# Patient Record
Sex: FEMALE | Race: WHITE | Employment: OTHER | URBAN - METROPOLITAN AREA
[De-identification: names, ages, dates, MRNs, and addresses within clinical notes are randomized per-mention and may not be internally consistent; named-entity substitution may affect disease eponyms.]

---

## 2021-10-18 ENCOUNTER — HOSPITAL ENCOUNTER (INPATIENT)
Age: 66
LOS: 3 days | Discharge: HOME | DRG: 391 | End: 2021-10-21
Attending: EMERGENCY MEDICINE | Admitting: FAMILY MEDICINE
Payer: COMMERCIAL

## 2021-10-18 DIAGNOSIS — K65.1 INTRA-ABDOMINAL ABSCESS (HCC): Primary | ICD-10-CM

## 2021-10-18 LAB
ABSOLUTE EOS #: 0.17 K/UL (ref 0–0.44)
ABSOLUTE IMMATURE GRANULOCYTE: 0.04 K/UL (ref 0–0.3)
ABSOLUTE LYMPH #: 2.14 K/UL (ref 1.1–3.7)
ABSOLUTE MONO #: 0.82 K/UL (ref 0.1–1.2)
ALBUMIN SERPL-MCNC: 3.2 G/DL (ref 3.5–5.2)
ALBUMIN/GLOBULIN RATIO: ABNORMAL (ref 1–2.5)
ALP BLD-CCNC: 110 U/L (ref 35–104)
ALT SERPL-CCNC: 20 U/L (ref 5–33)
ANION GAP SERPL CALCULATED.3IONS-SCNC: 14 MMOL/L (ref 9–17)
AST SERPL-CCNC: 25 U/L
BASOPHILS # BLD: 0 % (ref 0–2)
BASOPHILS ABSOLUTE: 0.03 K/UL (ref 0–0.2)
BILIRUB SERPL-MCNC: 0.18 MG/DL (ref 0.3–1.2)
BUN BLDV-MCNC: 11 MG/DL (ref 8–23)
BUN/CREAT BLD: 20 (ref 9–20)
CALCIUM SERPL-MCNC: 8.8 MG/DL (ref 8.6–10.4)
CHLORIDE BLD-SCNC: 96 MMOL/L (ref 98–107)
CO2: 23 MMOL/L (ref 20–31)
CREAT SERPL-MCNC: 0.54 MG/DL (ref 0.5–0.9)
DIFFERENTIAL TYPE: ABNORMAL
EOSINOPHILS RELATIVE PERCENT: 2 % (ref 1–4)
GFR AFRICAN AMERICAN: >60 ML/MIN
GFR NON-AFRICAN AMERICAN: >60 ML/MIN
GFR SERPL CREATININE-BSD FRML MDRD: ABNORMAL ML/MIN/{1.73_M2}
GFR SERPL CREATININE-BSD FRML MDRD: ABNORMAL ML/MIN/{1.73_M2}
GLUCOSE BLD-MCNC: 144 MG/DL (ref 70–99)
HCT VFR BLD CALC: 33.7 % (ref 36.3–47.1)
HEMOGLOBIN: 10.5 G/DL (ref 11.9–15.1)
IMMATURE GRANULOCYTES: 0 %
LYMPHOCYTES # BLD: 20 % (ref 24–43)
MCH RBC QN AUTO: 26.2 PG (ref 25.2–33.5)
MCHC RBC AUTO-ENTMCNC: 31.2 G/DL (ref 28.4–34.8)
MCV RBC AUTO: 84 FL (ref 82.6–102.9)
MONOCYTES # BLD: 8 % (ref 3–12)
NRBC AUTOMATED: 0 PER 100 WBC
PDW BLD-RTO: 15.2 % (ref 11.8–14.4)
PLATELET # BLD: 348 K/UL (ref 138–453)
PLATELET ESTIMATE: ABNORMAL
PMV BLD AUTO: 9 FL (ref 8.1–13.5)
POTASSIUM SERPL-SCNC: 4.5 MMOL/L (ref 3.7–5.3)
RBC # BLD: 4.01 M/UL (ref 3.95–5.11)
RBC # BLD: ABNORMAL 10*6/UL
SEG NEUTROPHILS: 70 % (ref 36–65)
SEGMENTED NEUTROPHILS ABSOLUTE COUNT: 7.61 K/UL (ref 1.5–8.1)
SODIUM BLD-SCNC: 133 MMOL/L (ref 135–144)
TOTAL PROTEIN: 7.4 G/DL (ref 6.4–8.3)
WBC # BLD: 10.8 K/UL (ref 3.5–11.3)
WBC # BLD: ABNORMAL 10*3/UL

## 2021-10-18 PROCEDURE — 80053 COMPREHEN METABOLIC PANEL: CPT

## 2021-10-18 PROCEDURE — 6360000002 HC RX W HCPCS: Performed by: FAMILY MEDICINE

## 2021-10-18 PROCEDURE — 85025 COMPLETE CBC W/AUTO DIFF WBC: CPT

## 2021-10-18 PROCEDURE — 99283 EMERGENCY DEPT VISIT LOW MDM: CPT

## 2021-10-18 PROCEDURE — 2580000003 HC RX 258: Performed by: FAMILY MEDICINE

## 2021-10-18 PROCEDURE — 1200000000 HC SEMI PRIVATE

## 2021-10-18 RX ORDER — SODIUM CHLORIDE 0.9 % (FLUSH) 0.9 %
5-40 SYRINGE (ML) INJECTION PRN
Status: DISCONTINUED | OUTPATIENT
Start: 2021-10-18 | End: 2021-10-21 | Stop reason: HOSPADM

## 2021-10-18 RX ORDER — METFORMIN HYDROCHLORIDE 750 MG/1
750 TABLET, EXTENDED RELEASE ORAL
COMMUNITY
Start: 2021-10-07

## 2021-10-18 RX ORDER — CIPROFLOXACIN 2 MG/ML
400 INJECTION, SOLUTION INTRAVENOUS EVERY 12 HOURS
Status: DISCONTINUED | OUTPATIENT
Start: 2021-10-19 | End: 2021-10-19

## 2021-10-18 RX ORDER — ACETAMINOPHEN 325 MG/1
650 TABLET ORAL EVERY 4 HOURS PRN
Status: DISCONTINUED | OUTPATIENT
Start: 2021-10-18 | End: 2021-10-19 | Stop reason: SDUPTHER

## 2021-10-18 RX ORDER — ONDANSETRON 4 MG/1
4 TABLET, ORALLY DISINTEGRATING ORAL EVERY 8 HOURS PRN
Status: DISCONTINUED | OUTPATIENT
Start: 2021-10-18 | End: 2021-10-21 | Stop reason: HOSPADM

## 2021-10-18 RX ORDER — SODIUM CHLORIDE 0.9 % (FLUSH) 0.9 %
5-40 SYRINGE (ML) INJECTION EVERY 12 HOURS SCHEDULED
Status: DISCONTINUED | OUTPATIENT
Start: 2021-10-18 | End: 2021-10-21 | Stop reason: HOSPADM

## 2021-10-18 RX ORDER — ONDANSETRON 2 MG/ML
4 INJECTION INTRAMUSCULAR; INTRAVENOUS EVERY 6 HOURS PRN
Status: DISCONTINUED | OUTPATIENT
Start: 2021-10-18 | End: 2021-10-21 | Stop reason: HOSPADM

## 2021-10-18 RX ORDER — SODIUM CHLORIDE 9 MG/ML
25 INJECTION, SOLUTION INTRAVENOUS PRN
Status: DISCONTINUED | OUTPATIENT
Start: 2021-10-18 | End: 2021-10-21 | Stop reason: HOSPADM

## 2021-10-18 RX ADMIN — SODIUM CHLORIDE, PRESERVATIVE FREE 10 ML: 5 INJECTION INTRAVENOUS at 23:44

## 2021-10-18 RX ADMIN — SODIUM CHLORIDE 25 ML: 9 INJECTION, SOLUTION INTRAVENOUS at 23:43

## 2021-10-18 RX ADMIN — CIPROFLOXACIN 400 MG: 2 INJECTION, SOLUTION INTRAVENOUS at 23:43

## 2021-10-19 LAB
ABSOLUTE EOS #: 0.17 K/UL (ref 0–0.44)
ABSOLUTE IMMATURE GRANULOCYTE: 0.02 K/UL (ref 0–0.3)
ABSOLUTE LYMPH #: 1.25 K/UL (ref 1.1–3.7)
ABSOLUTE MONO #: 0.51 K/UL (ref 0.1–1.2)
ANION GAP SERPL CALCULATED.3IONS-SCNC: 11 MMOL/L (ref 9–17)
BASOPHILS # BLD: 1 % (ref 0–2)
BASOPHILS ABSOLUTE: 0.03 K/UL (ref 0–0.2)
BUN BLDV-MCNC: 8 MG/DL (ref 8–23)
BUN/CREAT BLD: 16 (ref 9–20)
C-REACTIVE PROTEIN: 69.5 MG/L (ref 0–5)
CALCIUM SERPL-MCNC: 9 MG/DL (ref 8.6–10.4)
CHLORIDE BLD-SCNC: 103 MMOL/L (ref 98–107)
CO2: 25 MMOL/L (ref 20–31)
CREAT SERPL-MCNC: 0.5 MG/DL (ref 0.5–0.9)
DIFFERENTIAL TYPE: ABNORMAL
EOSINOPHILS RELATIVE PERCENT: 3 % (ref 1–4)
ESTIMATED AVERAGE GLUCOSE: 143 MG/DL
GFR AFRICAN AMERICAN: >60 ML/MIN
GFR NON-AFRICAN AMERICAN: >60 ML/MIN
GFR SERPL CREATININE-BSD FRML MDRD: ABNORMAL ML/MIN/{1.73_M2}
GFR SERPL CREATININE-BSD FRML MDRD: ABNORMAL ML/MIN/{1.73_M2}
GLUCOSE BLD-MCNC: 104 MG/DL (ref 65–105)
GLUCOSE BLD-MCNC: 118 MG/DL (ref 70–99)
GLUCOSE BLD-MCNC: 122 MG/DL (ref 65–105)
GLUCOSE BLD-MCNC: 123 MG/DL (ref 65–105)
GLUCOSE BLD-MCNC: 152 MG/DL (ref 65–105)
HBA1C MFR BLD: 6.6 % (ref 4–6)
HCT VFR BLD CALC: 34 % (ref 36.3–47.1)
HEMOGLOBIN: 10.3 G/DL (ref 11.9–15.1)
IMMATURE GRANULOCYTES: 0 %
LYMPHOCYTES # BLD: 25 % (ref 24–43)
MCH RBC QN AUTO: 25.9 PG (ref 25.2–33.5)
MCHC RBC AUTO-ENTMCNC: 30.3 G/DL (ref 28.4–34.8)
MCV RBC AUTO: 85.6 FL (ref 82.6–102.9)
MONOCYTES # BLD: 10 % (ref 3–12)
NRBC AUTOMATED: 0 PER 100 WBC
PDW BLD-RTO: 15.2 % (ref 11.8–14.4)
PLATELET # BLD: 492 K/UL (ref 138–453)
PLATELET ESTIMATE: ABNORMAL
PMV BLD AUTO: 8.3 FL (ref 8.1–13.5)
POTASSIUM SERPL-SCNC: 4.4 MMOL/L (ref 3.7–5.3)
PROCALCITONIN: 0.05 NG/ML
RBC # BLD: 3.97 M/UL (ref 3.95–5.11)
RBC # BLD: ABNORMAL 10*6/UL
SEDIMENTATION RATE, ERYTHROCYTE: 88 MM (ref 0–30)
SEG NEUTROPHILS: 60 % (ref 36–65)
SEGMENTED NEUTROPHILS ABSOLUTE COUNT: 2.98 K/UL (ref 1.5–8.1)
SODIUM BLD-SCNC: 139 MMOL/L (ref 135–144)
WBC # BLD: 5 K/UL (ref 3.5–11.3)
WBC # BLD: ABNORMAL 10*3/UL

## 2021-10-19 PROCEDURE — 94761 N-INVAS EAR/PLS OXIMETRY MLT: CPT

## 2021-10-19 PROCEDURE — 1200000000 HC SEMI PRIVATE

## 2021-10-19 PROCEDURE — 6360000002 HC RX W HCPCS: Performed by: FAMILY MEDICINE

## 2021-10-19 PROCEDURE — 83036 HEMOGLOBIN GLYCOSYLATED A1C: CPT

## 2021-10-19 PROCEDURE — 84145 PROCALCITONIN (PCT): CPT

## 2021-10-19 PROCEDURE — 99254 IP/OBS CNSLTJ NEW/EST MOD 60: CPT | Performed by: INTERNAL MEDICINE

## 2021-10-19 PROCEDURE — 2580000003 HC RX 258: Performed by: HOSPITALIST

## 2021-10-19 PROCEDURE — 85025 COMPLETE CBC W/AUTO DIFF WBC: CPT

## 2021-10-19 PROCEDURE — 82947 ASSAY GLUCOSE BLOOD QUANT: CPT

## 2021-10-19 PROCEDURE — 6360000002 HC RX W HCPCS: Performed by: HOSPITALIST

## 2021-10-19 PROCEDURE — 36415 COLL VENOUS BLD VENIPUNCTURE: CPT

## 2021-10-19 PROCEDURE — 80048 BASIC METABOLIC PNL TOTAL CA: CPT

## 2021-10-19 PROCEDURE — C9113 INJ PANTOPRAZOLE SODIUM, VIA: HCPCS | Performed by: FAMILY MEDICINE

## 2021-10-19 PROCEDURE — 6370000000 HC RX 637 (ALT 250 FOR IP): Performed by: FAMILY MEDICINE

## 2021-10-19 PROCEDURE — 85652 RBC SED RATE AUTOMATED: CPT

## 2021-10-19 PROCEDURE — 86140 C-REACTIVE PROTEIN: CPT

## 2021-10-19 PROCEDURE — 2580000003 HC RX 258: Performed by: FAMILY MEDICINE

## 2021-10-19 PROCEDURE — 2500000003 HC RX 250 WO HCPCS: Performed by: FAMILY MEDICINE

## 2021-10-19 PROCEDURE — 87040 BLOOD CULTURE FOR BACTERIA: CPT

## 2021-10-19 RX ORDER — MAGNESIUM SULFATE 1 G/100ML
1000 INJECTION INTRAVENOUS PRN
Status: DISCONTINUED | OUTPATIENT
Start: 2021-10-19 | End: 2021-10-21 | Stop reason: HOSPADM

## 2021-10-19 RX ORDER — SENNA PLUS 8.6 MG/1
1 TABLET ORAL 2 TIMES DAILY PRN
Status: DISCONTINUED | OUTPATIENT
Start: 2021-10-19 | End: 2021-10-21 | Stop reason: HOSPADM

## 2021-10-19 RX ORDER — MORPHINE SULFATE 2 MG/ML
1 INJECTION, SOLUTION INTRAMUSCULAR; INTRAVENOUS EVERY 4 HOURS PRN
Status: DISCONTINUED | OUTPATIENT
Start: 2021-10-19 | End: 2021-10-21

## 2021-10-19 RX ORDER — DEXTROSE MONOHYDRATE 25 G/50ML
12.5 INJECTION, SOLUTION INTRAVENOUS PRN
Status: DISCONTINUED | OUTPATIENT
Start: 2021-10-19 | End: 2021-10-21 | Stop reason: HOSPADM

## 2021-10-19 RX ORDER — ZOLPIDEM TARTRATE 5 MG/1
5 TABLET ORAL NIGHTLY PRN
Status: DISCONTINUED | OUTPATIENT
Start: 2021-10-19 | End: 2021-10-21 | Stop reason: HOSPADM

## 2021-10-19 RX ORDER — ALBUTEROL SULFATE 90 UG/1
2 AEROSOL, METERED RESPIRATORY (INHALATION) EVERY 6 HOURS PRN
Status: DISCONTINUED | OUTPATIENT
Start: 2021-10-19 | End: 2021-10-21 | Stop reason: HOSPADM

## 2021-10-19 RX ORDER — SODIUM CHLORIDE 9 MG/ML
10 INJECTION INTRAVENOUS DAILY
Status: DISCONTINUED | OUTPATIENT
Start: 2021-10-19 | End: 2021-10-21 | Stop reason: HOSPADM

## 2021-10-19 RX ORDER — DEXTROSE MONOHYDRATE 50 MG/ML
100 INJECTION, SOLUTION INTRAVENOUS PRN
Status: DISCONTINUED | OUTPATIENT
Start: 2021-10-19 | End: 2021-10-21 | Stop reason: HOSPADM

## 2021-10-19 RX ORDER — NICOTINE POLACRILEX 4 MG
15 LOZENGE BUCCAL PRN
Status: DISCONTINUED | OUTPATIENT
Start: 2021-10-19 | End: 2021-10-21 | Stop reason: HOSPADM

## 2021-10-19 RX ORDER — MAGNESIUM SULFATE 1 G/100ML
1000 INJECTION INTRAVENOUS PRN
Status: DISCONTINUED | OUTPATIENT
Start: 2021-10-19 | End: 2021-10-19 | Stop reason: SDUPTHER

## 2021-10-19 RX ORDER — SODIUM CHLORIDE 9 MG/ML
INJECTION, SOLUTION INTRAVENOUS CONTINUOUS
Status: DISCONTINUED | OUTPATIENT
Start: 2021-10-19 | End: 2021-10-21

## 2021-10-19 RX ORDER — SODIUM CHLORIDE 9 MG/ML
25 INJECTION, SOLUTION INTRAVENOUS PRN
Status: DISCONTINUED | OUTPATIENT
Start: 2021-10-19 | End: 2021-10-19 | Stop reason: SDUPTHER

## 2021-10-19 RX ORDER — POTASSIUM CHLORIDE 20 MEQ/1
40 TABLET, EXTENDED RELEASE ORAL PRN
Status: DISCONTINUED | OUTPATIENT
Start: 2021-10-19 | End: 2021-10-21 | Stop reason: HOSPADM

## 2021-10-19 RX ORDER — ROSUVASTATIN CALCIUM 5 MG/1
5 TABLET, COATED ORAL NIGHTLY
COMMUNITY

## 2021-10-19 RX ORDER — HYDROCODONE BITARTRATE AND ACETAMINOPHEN 5; 325 MG/1; MG/1
1.5 TABLET ORAL EVERY 4 HOURS PRN
Status: DISCONTINUED | OUTPATIENT
Start: 2021-10-19 | End: 2021-10-21 | Stop reason: HOSPADM

## 2021-10-19 RX ORDER — ACETAMINOPHEN 650 MG/1
650 SUPPOSITORY RECTAL EVERY 6 HOURS PRN
Status: DISCONTINUED | OUTPATIENT
Start: 2021-10-19 | End: 2021-10-21 | Stop reason: HOSPADM

## 2021-10-19 RX ORDER — SODIUM CHLORIDE 0.9 % (FLUSH) 0.9 %
10 SYRINGE (ML) INJECTION EVERY 12 HOURS SCHEDULED
Status: DISCONTINUED | OUTPATIENT
Start: 2021-10-19 | End: 2021-10-19 | Stop reason: SDUPTHER

## 2021-10-19 RX ORDER — PANTOPRAZOLE SODIUM 40 MG/10ML
40 INJECTION, POWDER, LYOPHILIZED, FOR SOLUTION INTRAVENOUS DAILY
Status: DISCONTINUED | OUTPATIENT
Start: 2021-10-19 | End: 2021-10-21 | Stop reason: HOSPADM

## 2021-10-19 RX ORDER — MORPHINE SULFATE 2 MG/ML
2 INJECTION, SOLUTION INTRAMUSCULAR; INTRAVENOUS
Status: DISCONTINUED | OUTPATIENT
Start: 2021-10-19 | End: 2021-10-19 | Stop reason: SDUPTHER

## 2021-10-19 RX ORDER — ACETAMINOPHEN 325 MG/1
650 TABLET ORAL EVERY 6 HOURS PRN
Status: DISCONTINUED | OUTPATIENT
Start: 2021-10-19 | End: 2021-10-21 | Stop reason: HOSPADM

## 2021-10-19 RX ORDER — DOXYCYCLINE HYCLATE 50 MG/1
50 CAPSULE ORAL DAILY
Status: ON HOLD | COMMUNITY
End: 2021-10-21 | Stop reason: HOSPADM

## 2021-10-19 RX ORDER — POTASSIUM CHLORIDE 7.45 MG/ML
10 INJECTION INTRAVENOUS PRN
Status: DISCONTINUED | OUTPATIENT
Start: 2021-10-19 | End: 2021-10-21 | Stop reason: HOSPADM

## 2021-10-19 RX ORDER — SODIUM CHLORIDE 0.9 % (FLUSH) 0.9 %
10 SYRINGE (ML) INJECTION PRN
Status: DISCONTINUED | OUTPATIENT
Start: 2021-10-19 | End: 2021-10-19 | Stop reason: SDUPTHER

## 2021-10-19 RX ADMIN — METRONIDAZOLE 500 MG: 500 INJECTION, SOLUTION INTRAVENOUS at 00:44

## 2021-10-19 RX ADMIN — PIPERACILLIN AND TAZOBACTAM 3375 MG: 3; .375 INJECTION, POWDER, LYOPHILIZED, FOR SOLUTION INTRAVENOUS at 23:50

## 2021-10-19 RX ADMIN — SODIUM CHLORIDE: 9 INJECTION, SOLUTION INTRAVENOUS at 11:00

## 2021-10-19 RX ADMIN — SODIUM CHLORIDE, PRESERVATIVE FREE 10 ML: 5 INJECTION INTRAVENOUS at 08:16

## 2021-10-19 RX ADMIN — INSULIN LISPRO 2 UNITS: 100 INJECTION, SOLUTION INTRAVENOUS; SUBCUTANEOUS at 12:18

## 2021-10-19 RX ADMIN — PIPERACILLIN SODIUM AND TAZOBACTAM SODIUM 4500 MG: 4; .5 INJECTION, POWDER, LYOPHILIZED, FOR SOLUTION INTRAVENOUS at 11:02

## 2021-10-19 RX ADMIN — METRONIDAZOLE 500 MG: 500 INJECTION, SOLUTION INTRAVENOUS at 08:17

## 2021-10-19 RX ADMIN — SODIUM CHLORIDE: 9 INJECTION, SOLUTION INTRAVENOUS at 23:47

## 2021-10-19 RX ADMIN — PIPERACILLIN AND TAZOBACTAM 3375 MG: 3; .375 INJECTION, POWDER, LYOPHILIZED, FOR SOLUTION INTRAVENOUS at 16:09

## 2021-10-19 RX ADMIN — PANTOPRAZOLE SODIUM 40 MG: 40 INJECTION, POWDER, FOR SOLUTION INTRAVENOUS at 08:15

## 2021-10-19 NOTE — FLOWSHEET NOTE
[facility]  PROGRESS NOTE    Room # 2016/2016-02   Name: Trey Busch              Reason for visit: Routine    I visited the patient and family. Admit Date & Time: 10/18/2021  9:06 PM    Assessment:  Trey Busch is a 60 y.o. female in the hospital because \"diverticulitis. \" Upon entering the room patient was sitting up talking to  and brother. Invited  into room. Intervention:  I introduced myself and my title as  I offered space for patient and family  to express feelings, needs, and concerns and provided a ministry presence. Patient is \"pain free\" and \"hungry\" but \"clear liquids only for now. \"  Confirmed patient's support system - family but no community of missy. Offered prayer card and brief prayer. Outcome:  Family expressed gratitude for visit. Plan:  Chaplains will remain available to offer spiritual and emotional support as needed. Electronically signed by Shruthi Burnett on 10/19/2021 at [address]         10/19/21 1024   Encounter Summary   Services provided to: Patient and family together   Referral/Consult From: Family;Rounding   Support System Family members  (: Pedro Cooper; Brother: Hayden Smiley)   Continue Visiting   (10/19/21)   Complexity of Encounter Low   Length of Encounter 15 minutes   Spiritual Assessment Completed Yes   Routine   Type Initial   Assessment Calm; Approachable;Coping   Intervention Active listening;Explored coping resources;Nurtured hope;Provided reading materials/devotional materials;Prayer;Sustaining presence/ Ministry of presence   Outcome Comfort;Expressed gratitude;Engaged in conversation

## 2021-10-19 NOTE — CARE COORDINATION
Case Management Initial Discharge Plan  Alec Quigley,             Met with:patient, spouse/SO or family member patient and brother to discuss discharge plans. Information verified: address, contacts, phone number, , insurance Yes  PCP: Lubna An MD  Date of last visit:     Insurance Provider: [facility] [place]    Discharge Planning    Living Arrangements:  Spouse/Significant Other   Support Systems:  Spouse/Significant Other    Home has 1 stories  2 stairs to climb to get into front door, 0 stairs to climb to reach second floor  Location of bedroom/bathroom in home  - main floor    Patient able to perform ADL's:Independent    Current Services (outpatient & in home) none  DME equipment: none  DME provider: N/A    Pharmacy: [facility]    Potential Assistance Needed:  N/A    Patient agreeable to home care: No  [place] of choice provided:  n/a    Prior SNF/Rehab Placement and Facility: No  Agreeable to SNF/Rehab: No  [place] of choice provided: n/a   Evaluation: n/a    Expected Discharge date:  10/21/21  Patient expects to be discharged to: Follow Up Appointment: Best Day/ Time: Thursday PM    Transportation provider:   Transportation arrangements needed for discharge: No    Readmission Risk              Risk of Unplanned Readmission:  8             Does patient have a readmission risk score greater than 14?: No  If yes, follow-up appointment must be made within 7 days of discharge. Goal of Care:       Discharge Plan: Met with patient,  and brother at bedside. Lives with , independent and drives. Admitted for intra-abd abscess found on OP CT scan. Has history of diverticulitis diagnosed this year. Currently on IV Zosyn. Surgery and ID consulted. No surgical intervention needed at this time since abscess is small and does not need drained.   No home needs anticipated and continue to follow surgery and ID plan of care.              Electronically signed by Haroldo Pendleton RN on 10/19/21 at 3:23 PM EDT

## 2021-10-19 NOTE — PROGRESS NOTES
Learning About the Safe Use of Antibiotics  Introduction  Antibiotics are drugs used to kill bacteria. Bacteria can cause infections. These include strep throat, ear infections, and pneumonia. These medicines can't cure everything. They don't kill viruses or help with allergies. They don't help illnesses such as the common cold, the flu, or a runny nose. And they can cause side effects. There are many types of antibiotics. Your doctor will decide which one will work best for your infection. Examples include:  · Amoxicillin. · Cephalexin (Keflex). · Ciprofloxacin (Cipro). What are the possible side effects? Side effects can include:  · Nausea. · Diarrhea. · Skin rash. · Yeast infection. · A severe allergic reaction. It may cause itching, swelling, and breathing problems. This is rare. You may have other side effects or reactions not listed here. Check the information that comes with your medicine. Should you take antibiotics just in case? Don't take antibiotics when you don't need them. If you do that, they may not work when you do need them. Each time you take antibiotics, you are more likely to have some bacteria that survive and aren't killed by the medicine. Bacteria that don't die can change and become even harder to kill. These are called antibiotic-resistant bacteria. They can cause longer and more serious infections. To treat them, you may need different, stronger antibiotics that have more side effects and may cost more. So always ask your doctor if antibiotics are the best treatment. Explain that you do not want antibiotics unless you need them. Help protect the community  Using antibiotics when they're not needed leads to the development of antibiotic-resistant bacteria. These tougher bacteria can spread to family members, children, and coworkers. People in your community will have a risk of getting an infection that is harder to cure and that costs more to treat.   How can you take antibiotics safely? Be safe with medicine. Take your antibiotics as directed. Do not stop taking them just because you feel better. You need to take the full course of medicine. This will help make sure your infection is cured. It will also help prevent the growth of antibiotic-resistant bacteria. Always take the exact amount that the label says to take. If the label says to take the medicine at a certain time, follow those directions. You might feel better after you take an antibiotic for a few days. But it is important to keep taking it for as long as prescribed. That will help you get rid of those bacteria that are a bit stronger and that survive the first few days of treatment. Where can you learn more? Go to https://[employer]. org and sign in to your [employer] account. Enter N566 in the [employer] box to learn more about \"Learning About the Safe Use of Antibiotics. \"     If you do not have an account, please click on the \"Sign Up Now\" link. Current as of: March 3, 2017  Content Version: 11.3  © 2134-8938 [employer]. Care instructions adapted under license by [facility] ([facility]). If you have questions about a medical condition or this instruction, always ask your healthcare professional. [address] any warranty or liability for your use of this information. Antibiotics are powerful drugs that are generally safe and very helpful in fighting disease, but there are times when antibiotics can actually be harmful. Antibiotics can have side effects, including allergic reactions and a potentially deadly diarrhea caused by the bacteria Clostridium difficile (C. diff). Antibiotics can also interfere with the action of other drugs a patient may be taking for another condition. These unintended reactions to antibiotics are called adverse drug events.    When someone takes an antibiotic that they do not need, they are needlessly exposed to the side effects of the drug and do not get any benefit from it. Moreover, taking an antibiotic when it is not needed can lead to the development of antibiotic resistance. When resistance develops, antibiotics may not be able to stop future infections. Every time someone takes an antibiotic they dont need, they increase their risk of developing a resistant infection in the future. Types of Adverse Drug Events Related to Antibiotics  Allergic Reactions  Every year, there are more than 140,000 emergency department visits for reactions to antibiotics. Almost four out of five (79%) emergency department visits for antibiotic-related adverse drug events are due to an allergic reaction. These reactions can range from mild rashes and itching to serious blistering skin reactions swelling of the face and throat, and breathing problems. Minimizing unnecessary antibiotic use is the best way to reduce the risk of adverse drug events from antibiotics. Patients should tell their doctors about any past drug reactions or allergies. C. difficile  C. difficile causes diarrhea linked to at least 14,000 [place] deaths each year. When a person takes antibiotics, good bacteria that protect against infection are destroyed for several months. During this time, patients can get sick from C. difficile picked up from contaminated surfaces or spread from a healthcare providers hands. Those most at risk are people, especially older adults, who take antibiotics and also get medical care. Take antibiotics exactly and only as prescribed. Drug Interactions and Side Effects  Antibiotics can interact with other drugs patients take, making those drugs or the antibiotics less effective. Some drug combinations can worsen the side effects of the antibiotic or other drug. Common side effects of antibiotics include nausea, diarrhea, and stomach pain. Sometimes these symptoms can lead to dehydration and other problems.  Patients should ask their doctors about drug interactions and the potential side effects of antibiotics.  The doctor should be told immediately if a patient has any side effects from antibiotics  Page last updated: February 24, 2017 Content source:   [employer] [employer] and [employer] for Emerging and Zoonotic Infectious Diseases ([facility])  Division of Healthcare Quality Promotion [facility] [place])

## 2021-10-19 NOTE — H&P
History & Physical  [facility].,    Adult Hospitalist      Name: Rudy Roe  MRN: [id number]     Acct: [de-identified]  Room: 2016/2016-02    Admit Date: 10/18/2021  9:06 PM  PCP: Sabra Howell MD    Primary Problem  Active Problems:    Intra-abdominal abscess [facility])  Resolved Problems:    * No resolved hospital problems. *        Assesment:     · Acute diverticulitis  · Intra-abdominal abscess  · Acute abdominal pain secondary to above  · Diabetes type 2        Plan:     · Admit to MedSurg telemetry  · O2 to maintain oxygen saturation greater than 92%  · Lactate and procalcitonin  · Blood culture  · Empiric antibiotics as below  · Pain control  · Antiemetics  · N.p.o.  · General surgery consult  · ID consult   · continue to monitor/telemetry/CBC with differential daily/BMP daily  · DVT and GI prophylaxis.   Continue medications as below      Scheduled Meds:   insulin lispro  0-12 Units SubCUTAneous TID WC    insulin lispro  0-6 Units SubCUTAneous Nightly    pantoprazole  40 mg IntraVENous Daily    And    sodium chloride (PF)  10 mL IntraVENous Daily    sodium chloride flush  10 mL IntraVENous 2 times per day    enoxaparin  40 mg SubCUTAneous Daily    piperacillin-tazobactam  4,500 mg IntraVENous Once    And    piperacillin-tazobactam  3,375 mg IntraVENous Q8H    sodium chloride flush  5-40 mL IntraVENous 2 times per day    enoxaparin  40 mg SubCUTAneous Daily     Continuous Infusions:   dextrose      sodium chloride      sodium chloride      sodium chloride 25 mL (10/18/21 8539)     PRN Meds:  glucose, 15 g, PRN  dextrose, 12.5 g, PRN  glucagon (rDNA), 1 mg, PRN  dextrose, 100 mL/hr, PRN  morphine, 2 mg, Q2H PRN  sodium chloride flush, 10 mL, PRN  sodium chloride, 25 mL, PRN  potassium chloride, 40 mEq, PRN   Or  potassium alternative oral replacement, 40 mEq, PRN   Or  potassium chloride, 10 mEq, PRN  magnesium sulfate, 1,000 mg, PRN  senna, 5 mL, BID PRN  acetaminophen, 650 mg, Q6H PRN Or  acetaminophen, 650 mg, Q6H PRN  morphine, 1 mg, Q4H PRN  zolpidem, 5 mg, Nightly PRN  magnesium sulfate, 1,000 mg, PRN  sodium chloride flush, 5-40 mL, PRN  sodium chloride, 25 mL, PRN  acetaminophen, 650 mg, Q4H PRN  ondansetron, 4 mg, Q8H PRN   Or  ondansetron, 4 mg, Q6H PRN        Chief Complaint:     Chief Complaint   Patient presents with    Other     CT done today @ [facility] seen by Dr. Fabian Mera told to be admitted         History of Present Illness:      Laurie Chavis is a 60 y.o.  female who presents with Other (CT done today @ [facility] seen by Dr. Fabian Mera told to be admitted)    29-year-old lady with past medical history of asthma, GERD, diabetes presented to ER after instruction from her surgeon. Per report patient had CT scanning abdomen done in [facility] and her surgeon told her to go to the ER for admission. Patient has had a history of diverticulitis. She was having frequent bowel movement. Per report, CT scan shows left-sided colitis such as diverticulitis and fluid collection along the anterior margin of the left psoas and iliac is finding consistent with contained perforation or multifocal abscess. Patient has been complaining of left lower quadrant pain, moderate to severe. She denies any nausea, vomiting, chest pain, fever, chills, change in urination or rash. I have personally reviewed the past medical history, past surgical history, medications, social history, and family history, and summarized in the note. Review of Systems:     All 10 point system is reviewed and negative otherwise mentioned in HPI.       Past Medical History:     Past Medical History:   Diagnosis Date    Arthritis     knee, back    Asthma     GERD (gastroesophageal reflux disease)         Past Surgical History:     Past Surgical History:   Procedure Laterality Date    COLONOSCOPY      HERNIA REPAIR      umbilical    HERNIA REPAIR  07/19/2016    incisional with mesh, robotic converted to open    TONSILLECTOMY Medications Prior to Admission:       Prior to Admission medications    Medication Sig Start Date End Date Taking? Authorizing Provider   metFORMIN (GLUCOPHAGE-XR) 500 MG extended release tablet  10/7/21  Yes Historical Provider, MD   doxycycline (ORACEA) 40 MG capsule Take 40 mg by mouth every morning   Yes Historical Provider, MD   omeprazole (PRILOSEC) 40 MG capsule Take 40 mg by mouth daily   Yes Historical Provider, MD   naproxen (NAPROSYN) 500 MG tablet Take 500 mg by mouth 2 times daily as needed for Pain   Yes Historical Provider, MD   HYDROcodone-acetaminophen (NORCO) 7.5-325 MG per tablet 1-2 tabs every 4 hours prn pain 16   Latonia Duncan MD   Multiple Vitamins-Minerals (THERAPEUTIC MULTIVITAMIN-MINERALS) tablet Take 1 tablet by mouth daily    Historical Provider, MD   Omega-3 Fatty Acids (FISH OIL) 1000 MG CAPS Take 1,000 mg by mouth daily    Historical Provider, MD   albuterol sulfate  (90 BASE) MCG/ACT inhaler Inhale 2 puffs into the lungs every 6 hours as needed for Wheezing    Historical Provider, MD        Allergies:       Sulfa antibiotics    Social History:     Tobacco:    reports that she has quit smoking. Her smoking use included cigarettes. She has a 0.75 pack-year smoking history. She has never used smokeless tobacco.  Alcohol:      reports current alcohol use. Drug Use:  reports no history of drug use. Family History:     History reviewed. No pertinent family history.       Physical Exam:     Vitals:  BP 95/63   Pulse 71   Temp 97.7 °F (36.5 °C) (Oral)   Resp 16   Ht 5' 8\" (1.727 m)   Wt 195 lb 14.4 oz (88.9 kg)   SpO2 98%   BMI 29.79 kg/m²   Temp (24hrs), Av °F (36.7 °C), Min:97.7 °F (36.5 °C), Max:98.2 °F (36.8 °C)          General appearance - alert, well appearing, and in no acute distress  Mental status - oriented to person, place, and time with normal affect  Head - normocephalic and atraumatic  Eyes - pupils equal and reactive, extraocular eye movements intact, conjunctiva clear  Ears - hearing appears to be intact  Nose - no drainage noted  Mouth - mucous membranes moist  Neck - supple, no carotid bruits, thyroid not palpable  Chest - clear to auscultation, normal effort  Heart - normal rate, regular rhythm, no murmur  Abdomen - soft, nontender, nondistended, bowel sounds present all four quadrants, no masses, hepatomegaly or splenomegaly  Neurological - normal speech, no focal findings or movement disorder noted, cranial nerves II through XII grossly intact  Extremities - peripheral pulses palpable, no pedal edema or calf pain with palpation  Skin - no gross lesions, rashes, or induration noted        Data:     Labs:    Hematology:  Recent Labs     10/18/21  2140 10/19/21  0743   WBC 10.8 5.0   RBC 4.01 3.97   HGB 10.5* 10.3*   HCT 33.7* 34.0*   MCV 84.0 85.6   MCH 26.2 25.9   MCHC 31.2 30.3   RDW 15.2* 15.2*    492*   MPV 9.0 8.3   SEDRATE  --  88*     Chemistry:  Recent Labs     10/18/21  2140 10/19/21  0743   * 139   K 4.5 4.4   CL 96* 103   CO2 23 25   GLUCOSE 144* 118*   BUN 11 8   CREATININE 0.54 0.50   ANIONGAP 14 11   LABGLOM >60 >60   GFRAA >60 >60   CALCIUM 8.8 9.0     Recent Labs     10/18/21  2140 10/19/21  0602   PROT 7.4  --    LABALBU 3.2*  --    AST 25  --    ALT 20  --    ALKPHOS 110*  --    BILITOT 0.18*  --    POCGLU  --  122*       No results found for: INR, PROTIME    No results found for: SPECIAL  No results found for: CULTURE    No results found for: POCPH, PHART, PH, POCPCO2, DEZ4IVC, PCO2, POCPO2, PO2ART, PO2, POCHCO3, OWA4MFR, HCO3, NBEA, PBEA, BEART, BE, THGBART, THB, IFB2DTE, BXUQ7TVX, F7ZKQMZM, O2SAT, FIO2    Radiology:    No results found.       All radiological studies reviewed                Code Status:  Full Code    Electronically signed by Mariann Espinoza MD on 10/19/2021 at 9:30 AM     Copy sent to Dr. Charito Gaines MD    This note was created with the assistance of a speech-recognition program.  Although the intention is to generate a document that actually reflects the content of the visit, no guarantees can be provided that every mistake has been identified and corrected by editing. Note was updated later by me after  physical examination and  completion of the assessment.

## 2021-10-19 NOTE — CONSULTS
[facility]  Initial Consult Note  Date: 10/19/2021    Hospital day :1     Impression:   1. Recurrent diverticulitis with intra-abdominal abscess    Recommendations   · Continue intravenous antimicrobial therapy with Zosyn. · The patient does not have any major clinical symptoms other than some mild abdominal pain. · The question at this point time is whether she will need a full course of IV antimicrobial therapy with follow-up CT imaging versus switching her to oral antibiotics. · I do note the plan per general surgery would be for surgical colon resection once the inflammatory process has cooled down. · I will follow her progress and adjust therapy accordingly    Chief complaint/reason for consultation:   Diverticular abscess    History of Present Illness:   Epifanio Root is a 60y.o.-year-old female who was initially admitted on 10/18/2021. Alida Mckinnon has a history of asthma, gastroesophageal reflux disease, arthritis, and she has had diverticulitis on a few occasions this year and has had 3 different courses of ciprofloxacin and metronidazole with the last course being in September of this year. The patient reports that she has had recurrent bouts of diverticulitis since about January 2021 and typically gets some abdominal discomfort. The patient went to follow-up with Dr. Billy Aleman and recently had Augmentin prescribed which she stopped on her fourth day of treatment due to pretty significant diarrhea. The patient had a CT scan of the abdomen done that demonstrated a left lower quadrant posterior abdominal abscess near the left psoas muscle likely related to a perforated diverticula abscess. The patient was advised to come into the emergency department due to the CT scan findings and the patient actually had some mild abdominal pain but was not having any fevers, chills, nausea, vomiting, or diarrhea.     The patient has been admitted started on IV antimicrobial therapy I was asked to evaluate some help with antibiotic choice. I have personally reviewed the past medical history, past surgical history, medications, social history, and family history, and I have updated the database accordingly.   Past Medical History:     Past Medical History:   Diagnosis Date    Arthritis     knee, back    Asthma     GERD (gastroesophageal reflux disease)      Past Surgical  History:     Past Surgical History:   Procedure Laterality Date    COLONOSCOPY      HERNIA REPAIR      umbilical    HERNIA REPAIR  07/19/2016    incisional with mesh, robotic converted to open    TONSILLECTOMY       Medications:      insulin lispro  0-12 Units SubCUTAneous TID WC    insulin lispro  0-6 Units SubCUTAneous Nightly    pantoprazole  40 mg IntraVENous Daily    And    sodium chloride (PF)  10 mL IntraVENous Daily    enoxaparin  40 mg SubCUTAneous Daily    piperacillin-tazobactam  3,375 mg IntraVENous Q8H    sodium chloride flush  5-40 mL IntraVENous 2 times per day     Social History:     Social History     Socioeconomic History    Marital status:      Spouse name: Not on file    Number of children: Not on file    Years of education: Not on file    Highest education level: Not on file   Occupational History    Not on file   Tobacco Use    Smoking status: Former Smoker     Packs/day: 0.25     Years: 3.00     Pack years: 0.75     Types: Cigarettes    Smokeless tobacco: Never Used    Tobacco comment: quit smoking at age 22   Vaping Use    Vaping Use: Never used   Substance and Sexual Activity    Alcohol use: Yes     Comment: weekends    Drug use: No    Sexual activity: Not on file   Other Topics Concern    Not on file   Social History Narrative    Not on file     Social Determinants of Health     Financial Resource Strain:     Difficulty of Paying Living Expenses:    Food Insecurity:     Worried About Running Out of Food in the Last Year:     [address] in the Last Year:    Transportation Needs:  Lack of Transportation (Medical):  Lack of Transportation (Non-Medical):    Physical Activity:     Days of Exercise per Week:     Minutes of Exercise per Session:    Stress:     Feeling of Stress :    Social Connections:     Frequency of Communication with Friends and Family:     Frequency of Social Gatherings with Friends and Family:     Attends [employer] Services:     Active Member of Clubs or Organizations:     Attends Club or Organization Meetings:     Marital Status:    Intimate Partner Violence:     Fear of Current or Ex-Partner:     Emotionally Abused:     Physically Abused:     Sexually Abused:      Family History:   History reviewed. No pertinent family history. Allergies:   Sulfa antibiotics     Review of Systems:   General: No fevers or chills. Eyes: No double vision or blurry vision. ENT: No sore throat or runny nose. Cardiovascular: No chest pain or palpitations. Lung: No shortness of breath or cough. Abdomen: The patient has some mild abdominal pain but no nausea or vomiting or diarrhea  Genitourinary: No increased urinary frequency, or dysuria. Musculoskeletal: No muscle aches or pains. Hematologic: No bleeding or bruising. Neurologic: No headache, weakness, numbness, or tingling. Physical Examination :   /62   Pulse 92   Temp 99 °F (37.2 °C) (Axillary)   Resp 17   Ht 5' 8\" (1.727 m)   Wt 195 lb 14.4 oz (88.9 kg)   SpO2 99%   BMI 29.79 kg/m²     Temperature Range: Temp: 99 °F (37.2 °C) Temp  Av.3 °F (36.8 °C)  Min: 97.7 °F (36.5 °C)  Max: 99 °F (37.2 °C)  General Appearance: Awake, alert, and in no apparent distress  Head: Normocephalic, without obvious abnormality, atraumatic  Eyes: Pupils equal, round, reactive, to light and accommodation; extraocular movements intact; sclera anicteric; conjunctivae pink  ENT: Oropharynx clear, without erythema, exudate, or thrush. Neck: Supple, without lymphadenopathy.    Pulmonary/Chest: Clear to auscultation, created with the assistance of a speech recognition program.  While intending to generate a document that actually reflects the content of the visit, the document can still have some errors including those of syntax and sound a like substitutions which may escape proof reading. In such instances, actual meaning can be extrapolated by contextual diversion.

## 2021-10-19 NOTE — ED NOTES
Pt presents for an abnormal CT scan. Pt had a CT scan of her abdomen at [facility] earlier and Dr. Rod Rosas told her to go to ER for admission. Pt has a history of diverticulitis. Denies any pain at this present time. Pt is having frequent bowel movements. Dr. Sonja Braswell spoke with Dr. Rod Rosas on admission. Paper copy of scan done at [facility] shows findings consistent with left sided colitis such as diverticulitis and fluid collection along the anterior margin of the left psoas and iliacus findings consistent with contained perforation or multifocal abscess. Paper copy of report on chart.       Gabriela Swann RN  10/18/21 9469

## 2021-10-19 NOTE — PROGRESS NOTES
Physical Therapy  DATE: 10/19/2021    NAME: Quincy Thrasher  MRN: [id number]   : 1955    Patient not seen this date for Physical Therapy due to:      [] Cancel by RN or physician due to:    [] Hemodialysis    [] Critical Lab Value Level     [] Blood transfusion in progress    [] Acute or unstable cardiovascular status   _MAP < 55 or more than >115  _HR < 40 or > 130    [] Acute or unstable pulmonary status   -FiO2 > 60%   _RR < 5 or >40    _O2 sats < 85%    [] Strict Bedrest    [] Off Unit for surgery or procedure    [] Off Unit for testing       [] Pending imaging to R/O fracture    [] Refusal by Patient      [x] Other (Pt states she is fully independent and does not require PT at this time)     [] PT being discontinued at this time. Patient independent. No further needs. [] PT being discontinued at this time as the patient has been transferred to hospice care. No further needs.       Jose Manuel Howard, PT

## 2021-10-19 NOTE — PROGRESS NOTES
Transitions of Care Pharmacy Service   Medication Review    The patient's list of current home medications has been reviewed. Source(s) of information: Patient/ Surescripts    Based on information provided by the above source(s), I have updated the patient's home med list as described below. Please review the ACTION REQUESTED section of this note below for any discrepancies on current hospital orders. I changed or updated the following medications on the patient's home medication list:  Removed Naprosyn 500mg  Norco 7.5/325mg (2016)  Fish Oil - pt choice     Added Crestor 5mg Po nightly     Adjusted   Metformin XR 500mg (no directions) to 1 PO BID w meals  Prilosec 40mg PO daily to daily prn (OTC)   Other Notes          PROVIDER ACTION REQUESTED  Medications that need to be addressed by a physician/nurse practitioner:    Medication Action Requested   Crestor     Please review and order as appropriate         Please feel free to call me with any questions about this encounter. Thank you. Griffin Lambert [facility]   Transitions of Care Pharmacy Service  Phone:  [phone]  Fax: [phone]      Electronically signed by Griffin Lambert [facility] on 10/19/2021 at 11:01 AM           Medications Prior to Admission: doxycycline (VIBRAMYCIN) 50 MG capsule, Take 50 mg by mouth daily For rosacea.   rosuvastatin (CRESTOR) 5 MG tablet, Take 5 mg by mouth nightly  metFORMIN (GLUCOPHAGE-XR) 500 MG extended release tablet, Take 500 mg by mouth 2 times daily (with meals)   omeprazole (PRILOSEC) 40 MG capsule, Take 40 mg by mouth daily PRN (OTC)  Multiple Vitamins-Minerals (THERAPEUTIC MULTIVITAMIN-MINERALS) tablet, Take 1 tablet by mouth daily  albuterol sulfate  (90 BASE) MCG/ACT inhaler, Inhale 2 puffs into the lungs every 6 hours as needed for Wheezing

## 2021-10-19 NOTE — PROGRESS NOTES
Occupational Therapy  [address]  Occupational Therapy Not Seen Note    Patient not available for Occupational Therapy due to:    [] Testing:    [] Hemodialysis    [] Cancelled by RN:    []Refusal by Patient:    [] Surgery:     [] Intubation:     [] Pain Medication:    [] Sedation:     [] Spine Precautions :    [] Medical Instability:    [x] Other: 10/19: Per RN and Pt, pt is independent with no skilled OT needs at this time. Will discharge from OT.       Jomar Jones OT

## 2021-10-19 NOTE — CONSULTS
[address]  General Surgery [facility]  Office: [phone]  Pager: [phone]  ______________________________________________________________________    General Surgery Consultation:      Reason For Consultation:   Diverticulitis, intraabdominal abscess    Referring Physician: Tamiko Hanks MD    History of Present Illness:   Shannan Barber is a 60 y.o. y.o. female who presents to [facility] with left sided intra-abdominal abscess near the psoas seen on outpatient CT scan. Patient has history of diverticulitis with her first episode at the beginning of the year. Patient was treated conservatively with IV antibiotics and bowel rest.  Patient presented to her follow-up appointment and interval CT scan and was found to have a left-sided colonic abscess near the psoas. Patient was directed to come to the hospital for admission for IV antibiotics. Patient denies fevers, chills, nausea and vomiting. She has had intermittent episodes of nonbloody diarrhea. She denies changes in appetite. Last colonoscopy was in March. Past surgical history includes incisional hernia repair x2 with mesh most recent in 2016. Patient denies history of bleeding clotting disorders. She has history of prediabetes and asthma.     Past Medical History:  Past Medical History:   Diagnosis Date    Arthritis     knee, back    Asthma     GERD (gastroesophageal reflux disease)        Past Surgical History:  Past Surgical History:   Procedure Laterality Date    COLONOSCOPY      HERNIA REPAIR      umbilical    HERNIA REPAIR  07/19/2016    incisional with mesh, robotic converted to open    TONSILLECTOMY         Social History:  Social History     Socioeconomic History    Marital status:      Spouse name: Not on file    Number of children: Not on file    Years of education: Not on file    Highest education level: Not on file   Occupational History    Not on file Tobacco Use    Smoking status: Former Smoker     Packs/day: 0.25     Years: 3.00     Pack years: 0.75     Types: Cigarettes    Smokeless tobacco: Never Used    Tobacco comment: quit smoking at age 22   Vaping Use    Vaping Use: Never used   Substance and Sexual Activity    Alcohol use: Yes     Comment: weekends    Drug use: No    Sexual activity: Not on file   Other Topics Concern    Not on file   Social History Narrative    Not on file     Social Determinants of Health     Financial Resource Strain:     Difficulty of Paying Living Expenses:    Food Insecurity:     Worried About Running Out of Food in the Last Year:     [address] in the Last Year:    Transportation Needs:     Lack of Transportation (Medical):  Lack of Transportation (Non-Medical):    Physical Activity:     Days of Exercise per Week:     Minutes of Exercise per Session:    Stress:     Feeling of Stress :    Social Connections:     Frequency of Communication with Friends and Family:     Frequency of Social Gatherings with Friends and Family:     Attends Alevism Services:     Active Member of Clubs or Organizations:     Attends Club or Organization Meetings:     Marital Status:    Intimate Partner Violence:     Fear of Current or Ex-Partner:     Emotionally Abused:     Physically Abused:     Sexually Abused:        Family History:  History reviewed. No pertinent family history.     Medications:    Current Facility-Administered Medications:     glucose (GLUTOSE) 40 % oral gel 15 g, 15 g, Oral, PRN, Stacy Saleem MD    dextrose 50 % IV solution, 12.5 g, IntraVENous, PRNStacy MD    glucagon (rDNA) injection 1 mg, 1 mg, IntraMUSCular, PRNStacy MD    dextrose 5 % solution, 100 mL/hr, IntraVENous, PRStacy STEWARD MD    insulin lispro (HUMALOG) injection vial 0-12 Units, 0-12 Units, SubCUTAneous, TID WCStacy MD    insulin lispro (HUMALOG) injection vial 0-6 Units, 0-6 Units, MD    0.9 % sodium chloride infusion, 25 mL, IntraVENous, PRN, Stacy Saleem MD, Last Rate: 100 mL/hr at 10/18/21 2343, 25 mL at 10/18/21 2343    ondansetron (ZOFRAN-ODT) disintegrating tablet 4 mg, 4 mg, Oral, Q8H PRN **OR** ondansetron (ZOFRAN) injection 4 mg, 4 mg, IntraVENous, Q6H PRN, Stacy Saleem MD    Review of Systems:  General: Denies fever, chills, night sweats, weight loss, malaise, fatigue  HEENT: Denies sore throat, sinus problems, allergic rhinosinusitis  Cardiovascular: Denies chest pain, palpitations, orthopnea/PND. Denies h/o murmurs  Pulmonary: Denies cough, shortness of breath, BONDS  GI:  per HPI; positive history of diarrhea  : Denies polyuria, dysuria, hematuria  Endocrine: Denies diabetes, thyroid problems. Hem/Onc: Denies anemia, h/o bleeding or clotting problems. Neurological: Denies h/o CVA, TIA  Skin: Denies rashes, ulcers  Musculoskeletal: Denies muscle, joint, back pain    Physical Exam:  Gen: No oriented, no acute distress  Neuro: No neurological deficits   Head: Normocephalic, atraumatic,   ENT: nares patent , external ears normal, trachea midline   CV: RRR   Lungs: Normal rate, normal effort  Abd: Soft, nondistended, mild tenderness with deep palpation to left lower quadrant, nonperitoneal, no rebound tenderness, surgical scars correlate to surgical history  : Deferred. Ext: Warm well perfused  Skin: No rash or erythema        Diagnostic Data:  Labs:  Reviewed.    CBC with Differential:    Lab Results   Component Value Date    WBC 5.0 10/19/2021    RBC 3.97 10/19/2021    HGB 10.3 10/19/2021    HCT 34.0 10/19/2021     10/19/2021    MCV 85.6 10/19/2021    MCH 25.9 10/19/2021    MCHC 30.3 10/19/2021    RDW 15.2 10/19/2021    LYMPHOPCT 25 10/19/2021    MONOPCT 10 10/19/2021    BASOPCT 1 10/19/2021    MONOSABS 0.51 10/19/2021    LYMPHSABS 1.25 10/19/2021    EOSABS 0.17 10/19/2021    BASOSABS 0.03 10/19/2021    DIFFTYPE NOT REPORTED 10/19/2021     BMP:    Lab Results Component Value Date     10/19/2021    K 4.4 10/19/2021     10/19/2021    CO2 25 10/19/2021    BUN 8 10/19/2021    LABALBU 3.2 10/18/2021    CREATININE 0.50 10/19/2021    CALCIUM 9.0 10/19/2021    GFRAA >60 10/19/2021    LABGLOM >60 10/19/2021    GLUCOSE 118 10/19/2021         Radiology:  CT abdomen from outside record reviewed. Impression:  Shannan Barber, 61 yo F presents with intraabdominal abscess secondary to diverticulitis. Plan:  1. Continue medical and supportive management per primary  2. No acute surgical intervention indicated at this time. Intra-abdominal abscesses small but not benefit from drainage. Patient does not have a surgical abdomen currently. 3. Recommend continue IV antibiotics  4. Serial abdominal examinations  5. Monitor bowel function  6. Okay for clear liquid diet    Discussed with Dr. Rod Rosas. Electronically signed by Ciera Lopez MD on 10/19/2021 at 10:35 AM   Attending Physician Statement  I have discussed the case, including pertinent history and exam findings with the resident. I agree with the assessment, plan and orders as documented by the resident. Patient is well-known to me. Patient was admitted to the hospital last evening because of worsening findings on the CT scan of the abdomen and pelvis. Has been managed for acute diverticulitis as outpatient. However, because of complaint of abdominal pain and night sweats, a repeat CT scan was performed which showed findings of more extraluminal air near the patient's sigmoid colon,  tracking towards the patient's left  psoas muscle and hip. No drainable abscess was noted. Patient is feeling okay. Recommend patient to be kept on IV antibiotics and depending on how she is doing we may slowly advance her diet and if needed a repeat CT scan may be undertaken.   Was explained to the patient that once her inflammatory process is cooled down with the  medical management, I would recommend a semielective colon resection to prevent these complications from happening in the future. Patient's questions were answered.

## 2021-10-19 NOTE — ED PROVIDER NOTES
[address] [facility]  eMERGENCY dEPARTMENT eNCOUnter      Pt Name: Fazal Swenson  MRN: [id number]  [place] 1955  Date of evaluation: 10/18/21      CHIEF COMPLAINT:  Chief Complaint   Patient presents with    Other     CT done today @ [facility] seen by Dr. Maximiliano Oviedo told to be admitted       [facility]    Fazal Swenson is a 60 y.o. female who presents with history of recurrent abdominal discomfort for which she is been treated with antibiotics intermittently for the past 10 months. She underwent CT scan of the abdomen today which demonstrated a left lower quadrant posterior abdominal abscess near the left psoas muscle most likely related to perforated diverticular disease. Yessenia Bar, her surgeon contacted us following the CT scan and advised that we have the patient admitted to the internal medicine hospitalist service. Patient currently indicates she has had mild left lower quadrant abdominal discomfort and some chills but denies any vomiting. She states she has had some loose stools as well but denies any melena or hematochezia. No urinary symptoms. No vaginal discharge or bleeding. No chest pain or difficulty breathing. No headache. No rash. Location/Symptom: See above  Timing/Onset: See above  Context/Setting: See above  Quality: See above  Duration: See above  Modifying Factors: See above  Severity: See above      REVIEW OF SYSTEMS       Please see above. PAST MEDICAL HISTORY   PMH:  has a past medical history of Arthritis, Asthma, and GERD (gastroesophageal reflux disease). Surgical History:  has a past surgical history that includes Tonsillectomy; Colonoscopy; hernia repair; and hernia repair (07/19/2016). Social History:  reports that she has quit smoking. Her smoking use included cigarettes. She has a 0.75 pack-year smoking history. She has never used smokeless tobacco. She reports current alcohol use. She reports that she does not use drugs.   Family History: Noncontributory at this time  Psychiatric History: Noncontributory at this time    Allergies:is allergic to sulfa antibiotics. PHYSICAL EXAM     INITIAL VITALS: /81   Pulse 107   Temp 98.2 °F (36.8 °C) (Oral)   Resp 20   Ht 5' 8\" (1.727 m)   Wt 195 lb 14.4 oz (88.9 kg)   SpO2 95%   BMI 29.79 kg/m²     Awake, alert, coop, responsive. Speech fluent, normal comprehension. Lungs clear bilaterally. No rales, rhonchi, wheezes, stridor, retractions. Cardiac-S1S2, RRR, no murmur, rub, or gallop. Abdomen soft, nondistended, with very mild left lower quadrant tenderness. There is no guarding or rebound. .  No organomegaly, mass, bruit. Normal bowel sounds. DIAGNOSTIC RESULTS     EKG: All EKG's are interpreted by the Emergency Department Physician who either signs or Co-signs this chart in the absence of a cardiologist.      RADIOLOGY:   I directly visualized the following  images and reviewed the radiologist interpretations:  No orders to display     The report of the patient's CT was reviewed. LABS:  Labs Reviewed   CBC WITH AUTO DIFFERENTIAL - Abnormal; Notable for the following components:       Result Value    Hemoglobin 10.5 (*)     Hematocrit 33.7 (*)     RDW 15.2 (*)     Seg Neutrophils 70 (*)     Lymphocytes 20 (*)     All other components within normal limits   COMPREHENSIVE METABOLIC PANEL W/ REFLEX TO MG FOR LOW K - Abnormal; Notable for the following components:    Glucose 144 (*)     Sodium 133 (*)     Chloride 96 (*)     Alkaline Phosphatase 110 (*)     Total Bilirubin 0.18 (*)     Albumin 3.2 (*)     All other components within normal limits     Lab results have been reviewed. EMERGENCY DEPARTMENT COURSE:   -------------------------  Place an IV and obtain routine blood work and upon returning of the blood work we will contact the internal medicine hospitalist service to admit the patient. Patient was discussed with Dr. Lincoln Fairbanks who agreed admit the patient.   Bridging orders been

## 2021-10-19 NOTE — PLAN OF CARE
Problem: Infection:  Goal: Will remain free from infection  Description: Will remain free from infection  Outcome: Ongoing     Problem: Safety:  Goal: Free from accidental physical injury  Description: Free from accidental physical injury  10/19/2021 1428 by Yulissa Guillen RN  Outcome: Ongoing     Problem: Daily Care:  Goal: Daily care needs are met  Description: Daily care needs are met  Outcome: Ongoing     Problem: Pain:  Goal: Patient's pain/discomfort is manageable  Description: Patient's pain/discomfort is manageable  10/19/2021 1428 by Yulissa Guillen RN  Outcome: Ongoing     Problem: Discharge Planning:  Goal: Patients continuum of care needs are met  Description: Patients continuum of care needs are met  Outcome: Ongoing

## 2021-10-19 NOTE — ACP (ADVANCE CARE PLANNING)
Advance Care Planning     Advance Care Planning Activator (Inpatient)  Conversation Note      Date of ACP Conversation: 10/19/2021     Conversation Conducted with: Patient with Decision Making Capacity    ACP Activator: Maddi Mata RN    {When Decision Maker makes decisions on behalf of the incapacitated patient: Decision Maker is asked to consider and make decisions based on patient values, known preferences, or best interests. Health Care Decision Maker:     Current Designated Health Care Decision Maker:   Osvaldo Pham (spouse)  Phone: [phone]  Click here to complete Healthcare Decision Makers including section of the Healthcare Decision Maker Relationship (ie \"Primary\")      Care Preferences    Ventilation: \"If you were in your present state of health and suddenly became very ill and were unable to breathe on your own, what would your preference be about the use of a ventilator (breathing machine) if it were available to you? \"      Would the patient desire the use of ventilator (breathing machine)?: yes    \"If your health worsens and it becomes clear that your chance of recovery is unlikely, what would your preference be about the use of a ventilator (breathing machine) if it were available to you? \"     Would the patient desire the use of ventilator (breathing machine)?: Yes      Resuscitation  \"CPR works best to restart the heart when there is a sudden event, like a heart attack, in someone who is otherwise healthy. Unfortunately, CPR does not typically restart the heart for people who have serious health conditions or who are very sick. \"    \"In the event your heart stopped as a result of an underlying serious health condition, would you want attempts to be made to restart your heart (answer \"yes\" for attempt to resuscitate) or would you prefer a natural death (answer \"no\" for do not attempt to resuscitate)? \" yes       [] Yes   [x] No   Educated Patient / Decision Maker regarding differences between Advance Directives and portable DNR orders.     Length of ACP Conversation in minutes:  5    Conversation Outcomes:  [x] ACP discussion completed  [] Existing advance directive reviewed with patient; no changes to patient's previously recorded wishes  [] New Advance Directive completed  [] Portable Do Not Rescitate prepared for Provider review and signature  [] POLST/POST/MOLST/MOST prepared for Provider review and signature      Follow-up plan:    [] Schedule follow-up conversation to continue planning  [] Referred individual to Provider for additional questions/concerns   [] Advised patient/agent/surrogate to review completed ACP document and update if needed with changes in condition, patient preferences or care setting    [] This note routed to one or more involved healthcare providers

## 2021-10-20 LAB
ABSOLUTE EOS #: 0.12 K/UL (ref 0–0.44)
ABSOLUTE IMMATURE GRANULOCYTE: 0.03 K/UL (ref 0–0.3)
ABSOLUTE LYMPH #: 1.27 K/UL (ref 1.1–3.7)
ABSOLUTE MONO #: 0.73 K/UL (ref 0.1–1.2)
ANION GAP SERPL CALCULATED.3IONS-SCNC: 11 MMOL/L (ref 9–17)
BASOPHILS # BLD: 0 % (ref 0–2)
BASOPHILS ABSOLUTE: 0.03 K/UL (ref 0–0.2)
BUN BLDV-MCNC: 6 MG/DL (ref 8–23)
BUN/CREAT BLD: 10 (ref 9–20)
CALCIUM SERPL-MCNC: 8.6 MG/DL (ref 8.6–10.4)
CHLORIDE BLD-SCNC: 104 MMOL/L (ref 98–107)
CO2: 24 MMOL/L (ref 20–31)
CREAT SERPL-MCNC: 0.59 MG/DL (ref 0.5–0.9)
DIFFERENTIAL TYPE: ABNORMAL
EOSINOPHILS RELATIVE PERCENT: 2 % (ref 1–4)
GFR AFRICAN AMERICAN: >60 ML/MIN
GFR NON-AFRICAN AMERICAN: >60 ML/MIN
GFR SERPL CREATININE-BSD FRML MDRD: ABNORMAL ML/MIN/{1.73_M2}
GFR SERPL CREATININE-BSD FRML MDRD: ABNORMAL ML/MIN/{1.73_M2}
GLUCOSE BLD-MCNC: 107 MG/DL (ref 65–105)
GLUCOSE BLD-MCNC: 116 MG/DL (ref 65–105)
GLUCOSE BLD-MCNC: 128 MG/DL (ref 65–105)
GLUCOSE BLD-MCNC: 138 MG/DL (ref 70–99)
HCT VFR BLD CALC: 32.5 % (ref 36.3–47.1)
HEMOGLOBIN: 10 G/DL (ref 11.9–15.1)
IMMATURE GRANULOCYTES: 0 %
LACTIC ACID: 0.7 MMOL/L (ref 0.5–2.2)
LYMPHOCYTES # BLD: 16 % (ref 24–43)
MCH RBC QN AUTO: 26 PG (ref 25.2–33.5)
MCHC RBC AUTO-ENTMCNC: 30.8 G/DL (ref 28.4–34.8)
MCV RBC AUTO: 84.6 FL (ref 82.6–102.9)
MONOCYTES # BLD: 9 % (ref 3–12)
NRBC AUTOMATED: 0 PER 100 WBC
PDW BLD-RTO: 15.1 % (ref 11.8–14.4)
PLATELET # BLD: 469 K/UL (ref 138–453)
PLATELET ESTIMATE: ABNORMAL
PMV BLD AUTO: 8.3 FL (ref 8.1–13.5)
POTASSIUM SERPL-SCNC: 4.1 MMOL/L (ref 3.7–5.3)
RBC # BLD: 3.84 M/UL (ref 3.95–5.11)
RBC # BLD: ABNORMAL 10*6/UL
SEG NEUTROPHILS: 73 % (ref 36–65)
SEGMENTED NEUTROPHILS ABSOLUTE COUNT: 5.95 K/UL (ref 1.5–8.1)
SODIUM BLD-SCNC: 139 MMOL/L (ref 135–144)
WBC # BLD: 8.1 K/UL (ref 3.5–11.3)
WBC # BLD: ABNORMAL 10*3/UL

## 2021-10-20 PROCEDURE — 99232 SBSQ HOSP IP/OBS MODERATE 35: CPT | Performed by: NURSE PRACTITIONER

## 2021-10-20 PROCEDURE — C9113 INJ PANTOPRAZOLE SODIUM, VIA: HCPCS | Performed by: FAMILY MEDICINE

## 2021-10-20 PROCEDURE — 80048 BASIC METABOLIC PNL TOTAL CA: CPT

## 2021-10-20 PROCEDURE — 83605 ASSAY OF LACTIC ACID: CPT

## 2021-10-20 PROCEDURE — 82947 ASSAY GLUCOSE BLOOD QUANT: CPT

## 2021-10-20 PROCEDURE — 6360000002 HC RX W HCPCS: Performed by: FAMILY MEDICINE

## 2021-10-20 PROCEDURE — 85025 COMPLETE CBC W/AUTO DIFF WBC: CPT

## 2021-10-20 PROCEDURE — 6360000002 HC RX W HCPCS: Performed by: HOSPITALIST

## 2021-10-20 PROCEDURE — 2580000003 HC RX 258: Performed by: FAMILY MEDICINE

## 2021-10-20 PROCEDURE — 2580000003 HC RX 258: Performed by: HOSPITALIST

## 2021-10-20 PROCEDURE — 36415 COLL VENOUS BLD VENIPUNCTURE: CPT

## 2021-10-20 PROCEDURE — 1200000000 HC SEMI PRIVATE

## 2021-10-20 RX ORDER — LIDOCAINE HYDROCHLORIDE 20 MG/ML
JELLY TOPICAL PRN
Status: DISCONTINUED | OUTPATIENT
Start: 2021-10-20 | End: 2021-10-21 | Stop reason: HOSPADM

## 2021-10-20 RX ADMIN — PIPERACILLIN AND TAZOBACTAM 3375 MG: 3; .375 INJECTION, POWDER, LYOPHILIZED, FOR SOLUTION INTRAVENOUS at 17:03

## 2021-10-20 RX ADMIN — PANTOPRAZOLE SODIUM 40 MG: 40 INJECTION, POWDER, FOR SOLUTION INTRAVENOUS at 08:43

## 2021-10-20 RX ADMIN — SODIUM CHLORIDE: 9 INJECTION, SOLUTION INTRAVENOUS at 12:51

## 2021-10-20 RX ADMIN — PIPERACILLIN AND TAZOBACTAM 3375 MG: 3; .375 INJECTION, POWDER, LYOPHILIZED, FOR SOLUTION INTRAVENOUS at 08:42

## 2021-10-20 RX ADMIN — SODIUM CHLORIDE, PRESERVATIVE FREE 10 ML: 5 INJECTION INTRAVENOUS at 17:02

## 2021-10-20 ASSESSMENT — ENCOUNTER SYMPTOMS
RESPIRATORY NEGATIVE: 1
ABDOMINAL PAIN: 1

## 2021-10-20 ASSESSMENT — PAIN SCALES - GENERAL: PAINLEVEL_OUTOF10: 0

## 2021-10-20 NOTE — PLAN OF CARE
Problem: Infection:  Goal: Will remain free from infection  Description: Will remain free from infection  10/19/2021 2259 by Willy Crooks RN  Outcome: Ongoing     Problem: Safety:  Goal: Free from accidental physical injury  Description: Free from accidental physical injury  10/19/2021 2259 by Willy Crooks RN  Outcome: Ongoing     Problem: Daily Care:  Goal: Daily care needs are met  Description: Daily care needs are met  10/19/2021 2259 by Willy Crooks RN  Outcome: Ongoing     Problem: Pain:  Goal: Patient's pain/discomfort is manageable  Description: Patient's pain/discomfort is manageable  10/19/2021 2259 by Willy Crooks RN  Outcome: Ongoing     Problem: Discharge Planning:  Goal: Patients continuum of care needs are met  Description: Patients continuum of care needs are met  10/19/2021 2259 by Willy Crooks RN  Outcome: Ongoing     Problem: Activity:  Goal: Risk for activity intolerance will decrease  Description: Risk for activity intolerance will decrease  Outcome: Ongoing     Problem: Bowel/Gastric:  Goal: Bowel function will improve  Description: Bowel function will improve  Outcome: Ongoing     Problem: Bowel/Gastric:  Goal: Diagnostic test results will improve  Description: Diagnostic test results will improve  Outcome: Ongoing     Problem: Bowel/Gastric:  Goal: Occurrences of nausea will decrease  Description: Occurrences of nausea will decrease  Outcome: Ongoing     Problem:  Bowel/Gastric:  Goal: Occurrences of vomiting will decrease  Description: Occurrences of vomiting will decrease  Outcome: Ongoing     Problem: Fluid Volume:  Goal: Maintenance of adequate hydration will improve  Description: Maintenance of adequate hydration will improve  Outcome: Ongoing     Problem: Health Behavior:  Goal: Ability to state signs and symptoms to report to health care provider will improve  Description: Ability to state signs and symptoms to report to health care provider will improve  Outcome: Ongoing     Problem: Physical Regulation:  Goal: Complications related to the disease process, condition or treatment will be avoided or minimized  Description: Complications related to the disease process, condition or treatment will be avoided or minimized  Outcome: Ongoing     Problem: Physical Regulation:  Goal: Ability to maintain clinical measurements within normal limits will improve  Description: Ability to maintain clinical measurements within normal limits will improve  Outcome: Ongoing     Problem: Sensory:  Goal: Ability to identify factors that increase the pain will improve  Description: Ability to identify factors that increase the pain will improve  Outcome: Ongoing     Problem: Sensory:  Goal: Ability to notify healthcare provider of pain before it becomes unmanageable or unbearable will improve  Description: Ability to notify healthcare provider of pain before it becomes unmanageable or unbearable will improve  Outcome: Ongoing     Problem: Sensory:  Goal: Pain level will decrease  Description: Pain level will decrease  Outcome: Ongoing

## 2021-10-20 NOTE — PROGRESS NOTES
[facility]  Progress Note    Sammy Andres  MRN: [id number]  Date: 10/20/2021  LOS: 2     Reason for F/U :   Intra-abdominal abscess    Impression :   1. Recurrent diverticulitis with intra-abdominal abscess    Recommendations:   · Patient continues on IV antimicrobial therapy with Zosyn  · Plan per general surgery is for surgical colon resection once the inflammatory process has decreased  · The question at this time is whether patient will need a course of IV antibiotics with a follow-up CT versus transitioning to oral antibiotics  · We will continue to follow clinical progress and adjust therapy accordingly    Infection Control Recommendations:   Universal precautions    Discharge Planning:   Estimated Length of IV antimicrobials: To be determined  Patient will need Midline Catheter Insertion/ PICC line Insertion: No  Patient will need: Home IV , [place],  SNF,  LTAC: Undetermined  Patient willneed outpatient wound care: No    Medical Decision making / Summary of Stay:   Sammy Andres is a 60y.o.-year-old female who was initially admitted on 10/18/2021. Katelyn Bro has a history of asthma, gastroesophageal reflux disease, arthritis, and she has had diverticulitis on a few occasions this year and has had 3 different courses of ciprofloxacin and metronidazole with the last course being in September of this year. The patient reports that she has had recurrent bouts of diverticulitis since about January 2021 and typically gets some abdominal discomfort. The patient went to follow-up with Dr. Josh Keene and recently had Augmentin prescribed which she stopped on her fourth day of treatment due to pretty significant diarrhea. The patient had a CT scan of the abdomen done that demonstrated a left lower quadrant posterior abdominal abscess near the left psoas muscle likely related to a perforated diverticula abscess.   The patient was advised to come into the emergency department due to the CT scan findings and the patient actually had some mild abdominal pain but was not having any fevers, chills, nausea, vomiting, or diarrhea.     The patient has been admitted started on IV antimicrobial therapy I was asked to evaluate some help with antibiotic choice. Current evaluation:10/20/2021      /60   Pulse 82   Temp 98.8 °F (37.1 °C) (Oral)   Resp 19   Ht 5' 8\" (1.727 m)   Wt 198 lb 4 oz (89.9 kg)   SpO2 97%   BMI 30.14 kg/m²     Temperature Range: Temp: 98.8 °F (37.1 °C) Temp  Av.9 °F (37.2 °C)  Min: 98.6 °F (37 °C)  Max: 99.3 °F (37.4 °C)    Patient was seen and evaluated sitting up at the bedside. She does continue to have some abdominal discomfort. She denies any fevers or chills. She is currently on a liquid diet, does feel hungry    Review of Systems   Constitutional: Negative. HENT: Negative. Respiratory: Negative. Cardiovascular: Negative. Gastrointestinal: Positive for abdominal pain. Genitourinary: Negative. Musculoskeletal: Negative. Skin: Negative. Neurological: Negative. Psychiatric/Behavioral: Negative. Physical Examination :     Physical Exam  Constitutional:       General: She is not in acute distress. Appearance: Normal appearance. She is normal weight. HENT:      Head: Normocephalic and atraumatic. Pulmonary:      Effort: Pulmonary effort is normal. No respiratory distress. Abdominal:      General: Abdomen is flat. There is no distension. Palpations: Abdomen is soft. Tenderness: There is abdominal tenderness (LLQ). Musculoskeletal:         General: No swelling. Normal range of motion. Skin:     General: Skin is warm and dry. Neurological:      General: No focal deficit present. Mental Status: She is alert and oriented to person, place, and time. Mental status is at baseline. Psychiatric:         Mood and Affect: Mood normal.         Behavior: Behavior normal.         Thought Content:  Thought content normal. Laboratory data:   I have independently reviewed the followinglabs:  CBC with Differential:   Recent Labs     10/19/21  0743 10/20/21  0635   WBC 5.0 8.1   HGB 10.3* 10.0*   HCT 34.0* 32.5*   * 469*   LYMPHOPCT 25 16*   MONOPCT 10 9     BMP:   Recent Labs     10/19/21  0743 10/20/21  0635    139   K 4.4 4.1    104   CO2 25 24   BUN 8 6*   CREATININE 0.50 0.59     Hepatic Function Panel:   Recent Labs     10/18/21  2140   PROT 7.4   LABALBU 3.2*   BILITOT 0.18*   ALKPHOS 110*   ALT 20   AST 25         Lab Results   Component Value Date    PROCAL 0.05 10/19/2021     Lab Results   Component Value Date    CRP 69.5 10/19/2021     Lab Results   Component Value Date    SEDRATE 88 (H) 10/19/2021         No results found for: DDIMER  No results found for: FERRITIN  No results found for: LDH  No results found for: FIBRINOGEN    No results found for requested labs within last 30 days. No results found for: COVID19    No results for input(s): VANCOTROUGH in the last 72 hours. Imaging Studies:   No new imaging    Cultures:     Culture, Blood 1 [[id number]]    Collected: 10/19/21 1015    Updated: 10/20/21 0744    Specimen Source: Blood     Specimen Description . BLOOD    Special Requests LEFT AC, 10ML    Culture NO GROWTH 18 HOURS   Culture, Blood 1 [[id number]]    Collected: 10/19/21 0950    Updated: 10/20/21 0744    Specimen Source: Blood     Specimen Description . BLOOD    Special Requests RAC,10ML    Culture NO GROWTH 18 HOURS         Medications:      insulin lispro  0-12 Units SubCUTAneous TID WC    insulin lispro  0-6 Units SubCUTAneous Nightly    pantoprazole  40 mg IntraVENous Daily    And    sodium chloride (PF)  10 mL IntraVENous Daily    enoxaparin  40 mg SubCUTAneous Daily    piperacillin-tazobactam  3,375 mg IntraVENous Q8H    sodium chloride flush  5-40 mL IntraVENous 2 times per day           [facility]  [address] [place] [address], APRN - CNP  [employer] messaging  OFFICE: [phone]      Electronically signed by TAMIKO Borja CNP on 10/20/2021 at 7:47 AM  Thank you for allowing us to participate in the care of this patient. Please call with questions. This note iscreated with the assistance of a speech recognition program.  While intending to generate a document that actually reflects the content of the visit, the document can still have some errors including those of syntax andsound a like substitutions which may escape proof reading. In such instances, actual meaning can be extrapolated by contextual diversion.

## 2021-10-20 NOTE — PROGRESS NOTES
Progress note  [facility],    Adult Hospitalist      Name: Epifanio Root  MRN: [id number]     Acct: [de-identified]  Room: 2016/2016-02    Admit Date: 10/18/2021  9:06 PM  PCP: Helen Godinez MD    Primary Problem  Active Problems:    Intra-abdominal abscess [facility])  Resolved Problems:    * No resolved hospital problems. *        Assesment:     · Acute diverticulitis  · Intra-abdominal abscess  · Acute abdominal pain secondary to above  · Diabetes type 2        Plan:     · Admit to MedSurg telemetry  · O2 to maintain oxygen saturation greater than 92%  · Full liquid diet  · Lactate and procalcitonin  · Blood culture  · Empiric antibiotics as below  · Pain control  · Antiemetics  · General surgery consult  · ID consult   · continue to monitor/telemetry/CBC with differential daily/BMP daily  · DVT and GI prophylaxis.   Continue medications as below      Scheduled Meds:   insulin lispro  0-12 Units SubCUTAneous TID WC    insulin lispro  0-6 Units SubCUTAneous Nightly    pantoprazole  40 mg IntraVENous Daily    And    sodium chloride (PF)  10 mL IntraVENous Daily    enoxaparin  40 mg SubCUTAneous Daily    piperacillin-tazobactam  3,375 mg IntraVENous Q8H    sodium chloride flush  5-40 mL IntraVENous 2 times per day     Continuous Infusions:   dextrose      sodium chloride 75 mL/hr at 10/19/21 2347    sodium chloride 25 mL (10/18/21 2343)     PRN Meds:  lidocaine, , PRN  glucose, 15 g, PRN  dextrose, 12.5 g, PRN  glucagon (rDNA), 1 mg, PRN  dextrose, 100 mL/hr, PRN  potassium chloride, 40 mEq, PRN   Or  potassium alternative oral replacement, 40 mEq, PRN   Or  potassium chloride, 10 mEq, PRN  senna, 1 tablet, BID PRN  acetaminophen, 650 mg, Q6H PRN   Or  acetaminophen, 650 mg, Q6H PRN  morphine, 1 mg, Q4H PRN  zolpidem, 5 mg, Nightly PRN  magnesium sulfate, 1,000 mg, PRN  albuterol sulfate HFA, 2 puff, Q6H PRN  HYDROcodone-acetaminophen, 1.5 tablet, Q4H PRN  sodium chloride flush, 5-40 mL, PRN  sodium chloride, 25 mL, PRN  ondansetron, 4 mg, Q8H PRN   Or  ondansetron, 4 mg, Q6H PRN        Chief Complaint:     Chief Complaint   Patient presents with    Other     CT done today @ [facility] seen by Dr. Eileen White told to be admitted         History of Present Illness:      Patient seen and examined at bedside. No overnight events. No acute complaints today. afebrile   Denies any chest pain, shortness of breath, palpitation, headache, dizziness, cough, cold, changes in urination or bowel habits      HPI:      Melissa Agrawal is a 60 y.o.  female who presents with Other (CT done today @ [facility] seen by Dr. Eileen White told to be admitted)    31-year-old lady with past medical history of asthma, GERD, diabetes presented to ER after instruction from her surgeon. Per report patient had CT scanning abdomen done in [facility] clinic and her surgeon told her to go to the ER for admission. Patient has had a history of diverticulitis. She was having frequent bowel movement. Per report, CT scan shows left-sided colitis such as diverticulitis and fluid collection along the anterior margin of the left psoas and iliac is finding consistent with contained perforation or multifocal abscess. Patient has been complaining of left lower quadrant pain, moderate to severe. She denies any nausea, vomiting, chest pain, fever, chills, change in urination or rash. I have personally reviewed the past medical history, past surgical history, medications, social history, and family history, and summarized in the note. Review of Systems:     All 10 point system is reviewed and negative otherwise mentioned in HPI.       Past Medical History:     Past Medical History:   Diagnosis Date    Arthritis     knee, back    Asthma     GERD (gastroesophageal reflux disease)         Past Surgical History:     Past Surgical History:   Procedure Laterality Date    COLONOSCOPY      HERNIA REPAIR      umbilical    HERNIA REPAIR  07/19/2016    incisional with mesh, robotic converted to open    TONSILLECTOMY          Medications Prior to Admission:       Prior to Admission medications    Medication Sig Start Date End Date Taking? Authorizing Provider   doxycycline (VIBRAMYCIN) 50 MG capsule Take 50 mg by mouth daily For rosacea. Yes Historical Provider, MD   rosuvastatin (CRESTOR) 5 MG tablet Take 5 mg by mouth nightly   Yes Historical Provider, MD   metFORMIN (GLUCOPHAGE-XR) 500 MG extended release tablet Take 500 mg by mouth 2 times daily (with meals)  10/7/21  Yes Historical Provider, MD   omeprazole (PRILOSEC) 40 MG capsule Take 40 mg by mouth daily as needed    Yes Historical Provider, MD   Multiple Vitamins-Minerals (THERAPEUTIC MULTIVITAMIN-MINERALS) tablet Take 1 tablet by mouth daily   Yes Historical Provider, MD   albuterol sulfate  (90 BASE) MCG/ACT inhaler Inhale 2 puffs into the lungs every 6 hours as needed for Wheezing    Historical Provider, MD        Allergies:       Sulfa antibiotics    Social History:     Tobacco:    reports that she has quit smoking. Her smoking use included cigarettes. She has a 0.75 pack-year smoking history. She has never used smokeless tobacco.  Alcohol:      reports current alcohol use. Drug Use:  reports no history of drug use. Family History:     History reviewed. No pertinent family history.       Physical Exam:     Vitals:  BP 98/62   Pulse 75   Temp 98.4 °F (36.9 °C) (Oral)   Resp 16   Ht 5' 8\" (1.727 m)   Wt 198 lb 4 oz (89.9 kg)   SpO2 98%   BMI 30.14 kg/m²   Temp (24hrs), Av.9 °F (37.2 °C), Min:98.4 °F (36.9 °C), Max:99.3 °F (37.4 °C)          General appearance - alert, well appearing, and in no acute distress  Mental status - oriented to person, place, and time with normal affect  Head - normocephalic and atraumatic  Eyes - pupils equal and reactive, extraocular eye movements intact, conjunctiva clear  Ears - hearing appears to be intact  Nose - no drainage noted  Mouth - mucous membranes moist  Neck - supple, no carotid bruits, thyroid not palpable  Chest - clear to auscultation, normal effort  Heart - normal rate, regular rhythm, no murmur  Abdomen - soft, nontender, nondistended, bowel sounds present all four quadrants, no masses, hepatomegaly or splenomegaly  Neurological - normal speech, no focal findings or movement disorder noted, cranial nerves II through XII grossly intact  Extremities - peripheral pulses palpable, no pedal edema or calf pain with palpation  Skin - no gross lesions, rashes, or induration noted        Data:     Labs:    Hematology:  Recent Labs     10/18/21  2140 10/19/21  0743 10/20/21  0635   WBC 10.8 5.0 8.1   RBC 4.01 3.97 3.84*   HGB 10.5* 10.3* 10.0*   HCT 33.7* 34.0* 32.5*   MCV 84.0 85.6 84.6   MCH 26.2 25.9 26.0   MCHC 31.2 30.3 30.8   RDW 15.2* 15.2* 15.1*    492* 469*   MPV 9.0 8.3 8.3   SEDRATE  --  88*  --    CRP  --  69.5*  --      Chemistry:  Recent Labs     10/18/21  2140 10/19/21  0743 10/20/21  0635   * 139 139   K 4.5 4.4 4.1   CL 96* 103 104   CO2 23 25 24   GLUCOSE 144* 118* 138*   BUN 11 8 6*   CREATININE 0.54 0.50 0.59   ANIONGAP 14 11 11   LABGLOM >60 >60 >60   GFRAA >60 >60 >60   CALCIUM 8.8 9.0 8.6     Recent Labs     10/18/21  2140 10/19/21  0602 10/19/21  0743 10/19/21  1202 10/19/21  1603 10/19/21  1943 10/20/21  0630   PROT 7.4  --   --   --   --   --   --    LABALBU 3.2*  --   --   --   --   --   --    LABA1C  --   --  6.6*  --   --   --   --    AST 25  --   --   --   --   --   --    ALT 20  --   --   --   --   --   --    ALKPHOS 110*  --   --   --   --   --   --    BILITOT 0.18*  --   --   --   --   --   --    POCGLU  --  122*  --  152* 104 123* 128*       No results found for: INR, PROTIME    Lab Results   Component Value Date/Time    SPECIAL LEFT AC, 10ML 10/19/2021 10:15 AM     Lab Results   Component Value Date/Time    CULTURE NO GROWTH 20 HOURS 10/19/2021 10:15 AM       No results found for: POCPH, PHART, PH, POCPCO2, PAG0BWV, PCO2, POCPO2, PO2ART, PO2, POCHCO3, FRF6KNQ, HCO3, NBEA, PBEA, BEART, BE, THGBART, THB, AXD0GMT, HLYW2LWH, V7ERCBJU, O2SAT, FIO2    Radiology:    No results found. All radiological studies reviewed                Code Status:  Full Code    Electronically signed by Lynne Hong MD on 10/20/2021 at 9:55 AM     Copy sent to Dr. Nicolette Ding MD    This note was created with the assistance of a speech-recognition program.  Although the intention is to generate a document that actually reflects the content of the visit, no guarantees can be provided that every mistake has been identified and corrected by editing. Note was updated later by me after  physical examination and  completion of the assessment.

## 2021-10-20 NOTE — PLAN OF CARE
Problem: Infection:  Goal: Will remain free from infection  Description: Will remain free from infection  10/20/2021 1234 by Abhijeet Jacobs RN  Outcome: Ongoing     Problem: Safety:  Goal: Free from accidental physical injury  Description: Free from accidental physical injury  10/20/2021 1234 by Abhijeet Jacobs RN  Outcome: Ongoing     Problem: Daily Care:  Goal: Daily care needs are met  Description: Daily care needs are met  10/20/2021 1234 by Abhijeet Jacobs RN  Outcome: Ongoing     Problem: Discharge Planning:  Goal: Patients continuum of care needs are met  Description: Patients continuum of care needs are met  10/20/2021 1234 by Abhijeet Jacobs RN  Outcome: Ongoing

## 2021-10-20 NOTE — PROGRESS NOTES
_______________________________________________________    GENERAL SURGERY - DR. DELGADO [place], [place]  Office: [phone]  Pager: [phone]  _________________________________________________________        Surgical Progress Note    Subjective:  Patient seen and evaluated. No acute events overnight. Patient continues to have diarrhea. Noticed bright red blood on tissue paper. Has history of hemorrhoids. Denies nausea vomiting. Tolerated liquid diet. Vitals:    10/20/21 0418   BP: 102/60   Pulse: 82   Resp: 19   Temp: 98.8 °F (37.1 °C)   SpO2: 97%        Objective:    Exam: General Appearance: alert and oriented to person, place and time, well developed and well- nourished, in no acute distress  Skin: warm and dry, no rash or erythema  Head: normocephalic and atraumatic  Eyes:extraocular eye movements intact, conjunctivae normal  ENT: external ear normal, trachea midline, nares patent  Pulmonary/Chest: normal air movement, no respiratory distress  Cardiovascular: normal rate, regular rhythm,  Abdomen: soft, mild tenderness to LLQ, non-distended  Extremities: no cyanosis, clubbing or edema  Musculoskeletal: normal range of motion, no joint swelling, deformity or tenderness  Neurologic: no cranial nerve deficit, gait, speech normal    I/O last 3 completed shifts:   In: 815 [P.O.:360; I.V.:355; IV Piggyback:100]  Out: -     Hemoglobin   Date Value Ref Range Status   10/19/2021 10.3 (L) 11.9 - 15.1 g/dL Final     Hematocrit   Date Value Ref Range Status   10/19/2021 34.0 (L) 36.3 - 47.1 % Final     WBC   Date Value Ref Range Status   10/19/2021 5.0 3.5 - 11.3 k/uL Final     Sodium   Date Value Ref Range Status   10/19/2021 139 135 - 144 mmol/L Final     Potassium   Date Value Ref Range Status   10/19/2021 4.4 3.7 - 5.3 mmol/L Final     Chloride   Date Value Ref Range Status   10/19/2021 103 98 - 107 mmol/L Final     CO2   Date Value Ref Range Status   10/19/2021 25 20 - 31 mmol/L Final     Glucose   Date Value Ref Range Status   10/19/2021 118 (H) 70 - 99 mg/dL Final         Assessment/Plan:      Magdalene Martin, 59 yo F who presents with an intra-abdominal abscess secondary to diverticulitis after failed outpatient antibiotic therapy. 1. Continue medical and supportive care per primary  2. No acute surgical intervention at this time. We will continue to treat intra-abdominal abscess with IV antibiotics. Patient clinically improved from yesterday. 3. Continue to monitor bowel function  4. Okay for full liquid diet   5. will provide lidocaine gel for hemorrhoids      Electronically signed by Vilma Levy MD on 10/20/2021 at 6:38 AM   Attending Physician Statement  I have discussed the case, including pertinent history and exam findings with the resident. I agree with the assessment, plan and orders as documented by the resident. Patient feeling better. Recommended advancing to full liquid diet. Patient continues to do well can consider discharge for home on oral antibiotics in 24 to 36 hours. In view of the abnormal CT scan finding of extraluminal air but no discrete abscess, I believe patient will eventually require sigmoid resection.

## 2021-10-21 VITALS
HEART RATE: 77 BPM | TEMPERATURE: 97.8 F | OXYGEN SATURATION: 100 % | HEIGHT: 68 IN | RESPIRATION RATE: 18 BRPM | BODY MASS INDEX: 30.79 KG/M2 | WEIGHT: 203.13 LBS | SYSTOLIC BLOOD PRESSURE: 123 MMHG | DIASTOLIC BLOOD PRESSURE: 68 MMHG

## 2021-10-21 LAB
ABSOLUTE EOS #: 0.14 K/UL (ref 0–0.44)
ABSOLUTE IMMATURE GRANULOCYTE: 0.03 K/UL (ref 0–0.3)
ABSOLUTE LYMPH #: 1.58 K/UL (ref 1.1–3.7)
ABSOLUTE MONO #: 0.59 K/UL (ref 0.1–1.2)
ANION GAP SERPL CALCULATED.3IONS-SCNC: 11 MMOL/L (ref 9–17)
BASOPHILS # BLD: 0 % (ref 0–2)
BASOPHILS ABSOLUTE: 0.03 K/UL (ref 0–0.2)
BUN BLDV-MCNC: 4 MG/DL (ref 8–23)
BUN/CREAT BLD: 7 (ref 9–20)
CALCIUM SERPL-MCNC: 8.8 MG/DL (ref 8.6–10.4)
CHLORIDE BLD-SCNC: 104 MMOL/L (ref 98–107)
CO2: 25 MMOL/L (ref 20–31)
CREAT SERPL-MCNC: 0.57 MG/DL (ref 0.5–0.9)
DIFFERENTIAL TYPE: ABNORMAL
EOSINOPHILS RELATIVE PERCENT: 2 % (ref 1–4)
GFR AFRICAN AMERICAN: >60 ML/MIN
GFR NON-AFRICAN AMERICAN: >60 ML/MIN
GFR SERPL CREATININE-BSD FRML MDRD: ABNORMAL ML/MIN/{1.73_M2}
GFR SERPL CREATININE-BSD FRML MDRD: ABNORMAL ML/MIN/{1.73_M2}
GLUCOSE BLD-MCNC: 127 MG/DL (ref 65–105)
GLUCOSE BLD-MCNC: 142 MG/DL (ref 70–99)
HCT VFR BLD CALC: 35.9 % (ref 36.3–47.1)
HEMOGLOBIN: 10.7 G/DL (ref 11.9–15.1)
IMMATURE GRANULOCYTES: 0 %
LACTIC ACID: 0.8 MMOL/L (ref 0.5–2.2)
LYMPHOCYTES # BLD: 21 % (ref 24–43)
MCH RBC QN AUTO: 25.8 PG (ref 25.2–33.5)
MCHC RBC AUTO-ENTMCNC: 29.8 G/DL (ref 28.4–34.8)
MCV RBC AUTO: 86.5 FL (ref 82.6–102.9)
MONOCYTES # BLD: 8 % (ref 3–12)
NRBC AUTOMATED: 0 PER 100 WBC
PDW BLD-RTO: 15.3 % (ref 11.8–14.4)
PLATELET # BLD: 515 K/UL (ref 138–453)
PLATELET ESTIMATE: ABNORMAL
PMV BLD AUTO: 8.3 FL (ref 8.1–13.5)
POTASSIUM SERPL-SCNC: 4.1 MMOL/L (ref 3.7–5.3)
RBC # BLD: 4.15 M/UL (ref 3.95–5.11)
RBC # BLD: ABNORMAL 10*6/UL
SEG NEUTROPHILS: 69 % (ref 36–65)
SEGMENTED NEUTROPHILS ABSOLUTE COUNT: 5.17 K/UL (ref 1.5–8.1)
SODIUM BLD-SCNC: 140 MMOL/L (ref 135–144)
WBC # BLD: 7.5 K/UL (ref 3.5–11.3)
WBC # BLD: ABNORMAL 10*3/UL

## 2021-10-21 PROCEDURE — 82947 ASSAY GLUCOSE BLOOD QUANT: CPT

## 2021-10-21 PROCEDURE — 6370000000 HC RX 637 (ALT 250 FOR IP): Performed by: FAMILY MEDICINE

## 2021-10-21 PROCEDURE — 80048 BASIC METABOLIC PNL TOTAL CA: CPT

## 2021-10-21 PROCEDURE — 6370000000 HC RX 637 (ALT 250 FOR IP): Performed by: STUDENT IN AN ORGANIZED HEALTH CARE EDUCATION/TRAINING PROGRAM

## 2021-10-21 PROCEDURE — 36415 COLL VENOUS BLD VENIPUNCTURE: CPT

## 2021-10-21 PROCEDURE — 83605 ASSAY OF LACTIC ACID: CPT

## 2021-10-21 PROCEDURE — 6360000002 HC RX W HCPCS: Performed by: HOSPITALIST

## 2021-10-21 PROCEDURE — C9113 INJ PANTOPRAZOLE SODIUM, VIA: HCPCS | Performed by: FAMILY MEDICINE

## 2021-10-21 PROCEDURE — 6360000002 HC RX W HCPCS: Performed by: FAMILY MEDICINE

## 2021-10-21 PROCEDURE — 85025 COMPLETE CBC W/AUTO DIFF WBC: CPT

## 2021-10-21 PROCEDURE — 2580000003 HC RX 258: Performed by: FAMILY MEDICINE

## 2021-10-21 PROCEDURE — 99232 SBSQ HOSP IP/OBS MODERATE 35: CPT | Performed by: NURSE PRACTITIONER

## 2021-10-21 PROCEDURE — 2580000003 HC RX 258: Performed by: HOSPITALIST

## 2021-10-21 RX ORDER — CIPROFLOXACIN 500 MG/1
500 TABLET, FILM COATED ORAL EVERY 12 HOURS SCHEDULED
Status: DISCONTINUED | OUTPATIENT
Start: 2021-10-21 | End: 2021-10-21 | Stop reason: HOSPADM

## 2021-10-21 RX ORDER — CIPROFLOXACIN 500 MG/1
500 TABLET, FILM COATED ORAL EVERY 12 HOURS SCHEDULED
Qty: 27 TABLET | Refills: 0 | Status: ON HOLD
Start: 2021-10-21 | End: 2021-10-29 | Stop reason: HOSPADM

## 2021-10-21 RX ORDER — HYDROCODONE BITARTRATE AND ACETAMINOPHEN 5; 325 MG/1; MG/1
1.5 TABLET ORAL EVERY 4 HOURS PRN
Qty: 15 TABLET | Refills: 0 | Status: SHIPPED | OUTPATIENT
Start: 2021-10-21 | End: 2021-10-24

## 2021-10-21 RX ORDER — METRONIDAZOLE 500 MG/1
500 TABLET ORAL EVERY 8 HOURS SCHEDULED
Status: DISCONTINUED | OUTPATIENT
Start: 2021-10-21 | End: 2021-10-21 | Stop reason: HOSPADM

## 2021-10-21 RX ORDER — METRONIDAZOLE 500 MG/1
500 TABLET ORAL EVERY 8 HOURS SCHEDULED
Qty: 42 TABLET | Refills: 0 | Status: ON HOLD
Start: 2021-10-21 | End: 2021-10-29 | Stop reason: HOSPADM

## 2021-10-21 RX ADMIN — PIPERACILLIN AND TAZOBACTAM 3375 MG: 3; .375 INJECTION, POWDER, LYOPHILIZED, FOR SOLUTION INTRAVENOUS at 00:00

## 2021-10-21 RX ADMIN — SODIUM CHLORIDE, PRESERVATIVE FREE 10 ML: 5 INJECTION INTRAVENOUS at 08:01

## 2021-10-21 RX ADMIN — INSULIN LISPRO 2 UNITS: 100 INJECTION, SOLUTION INTRAVENOUS; SUBCUTANEOUS at 12:13

## 2021-10-21 RX ADMIN — PANTOPRAZOLE SODIUM 40 MG: 40 INJECTION, POWDER, FOR SOLUTION INTRAVENOUS at 08:01

## 2021-10-21 RX ADMIN — METRONIDAZOLE 500 MG: 500 TABLET ORAL at 14:22

## 2021-10-21 RX ADMIN — LIDOCAINE HYDROCHLORIDE: 20 JELLY TOPICAL at 06:42

## 2021-10-21 RX ADMIN — SODIUM CHLORIDE: 9 INJECTION, SOLUTION INTRAVENOUS at 04:07

## 2021-10-21 RX ADMIN — METRONIDAZOLE 500 MG: 500 TABLET ORAL at 08:01

## 2021-10-21 RX ADMIN — CIPROFLOXACIN 500 MG: 500 TABLET, FILM COATED ORAL at 08:01

## 2021-10-21 ASSESSMENT — ENCOUNTER SYMPTOMS
ABDOMINAL PAIN: 0
RESPIRATORY NEGATIVE: 1

## 2021-10-21 NOTE — PLAN OF CARE
Problem: Infection:  Goal: Will remain free from infection  Description: Will remain free from infection  10/21/2021 0142 by Lul Bey RN  Outcome: Ongoing     Problem: Safety:  Goal: Free from accidental physical injury  Description: Free from accidental physical injury  10/21/2021 0142 by Lul Bey RN  Outcome: Ongoing     Problem: Daily Care:  Goal: Daily care needs are met  Description: Daily care needs are met  10/21/2021 0142 by Lul Bey RN  Outcome: Ongoing     Problem: Pain:  Goal: Patient's pain/discomfort is manageable  Description: Patient's pain/discomfort is manageable  10/21/2021 0142 by Lul Bey RN  Outcome: Ongoing     Problem: Discharge Planning:  Goal: Patients continuum of care needs are met  Description: Patients continuum of care needs are met  10/21/2021 0142 by Lul Bey RN  Outcome: Ongoing     Problem: Activity:  Goal: Risk for activity intolerance will decrease  Description: Risk for activity intolerance will decrease  10/21/2021 0142 by Lul Bey RN  Outcome: Ongoing     Problem: Bowel/Gastric:  Goal: Bowel function will improve  Description: Bowel function will improve  10/21/2021 0142 by Lul Bey RN  Outcome: Ongoing     Problem: Bowel/Gastric:  Goal: Diagnostic test results will improve  Description: Diagnostic test results will improve  10/21/2021 0142 by Lul Bey RN  Outcome: Ongoing     Problem: Bowel/Gastric:  Goal: Occurrences of nausea will decrease  Description: Occurrences of nausea will decrease  10/21/2021 0142 by Lul Bey RN  Outcome: Ongoing     Problem:  Bowel/Gastric:  Goal: Occurrences of vomiting will decrease  Description: Occurrences of vomiting will decrease  10/21/2021 0142 by Lul Bey RN  Outcome: Ongoing     Problem: Fluid Volume:  Goal: Maintenance of adequate hydration will improve  Description: Maintenance of adequate hydration will improve  10/21/2021 0142 by Lloyd Rodriguez RN  Outcome: Ongoing     Problem: Health Behavior:  Goal: Ability to state signs and symptoms to report to health care provider will improve  Description: Ability to state signs and symptoms to report to health care provider will improve  10/21/2021 0142 by Lloyd Rodriguez RN  Outcome: Ongoing     Problem: Physical Regulation:  Goal: Complications related to the disease process, condition or treatment will be avoided or minimized  Description: Complications related to the disease process, condition or treatment will be avoided or minimized  10/21/2021 0142 by Lloyd Rodriguez RN  Outcome: Ongoing     Problem: Physical Regulation:  Goal: Ability to maintain clinical measurements within normal limits will improve  Description: Ability to maintain clinical measurements within normal limits will improve  10/21/2021 0142 by Lloyd Rodriguez RN  Outcome: Ongoing     Problem: Sensory:  Goal: Ability to identify factors that increase the pain will improve  Description: Ability to identify factors that increase the pain will improve  10/21/2021 0142 by Lloyd Rodriguez RN  Outcome: Ongoing     Problem: Sensory:  Goal: Ability to notify healthcare provider of pain before it becomes unmanageable or unbearable will improve  Description: Ability to notify healthcare provider of pain before it becomes unmanageable or unbearable will improve  10/21/2021 0142 by Lloyd Rodriguez RN  Outcome: Ongoing     Problem: Sensory:  Goal: Pain level will decrease  Description: Pain level will decrease  10/21/2021 0142 by Lloyd Rodriguez RN  Outcome: Ongoing

## 2021-10-21 NOTE — DISCHARGE SUMMARY
[facility],    Adult Hospitalist      Patient ID: Philippe Romero  MRN: [id number]     Acct:  [de-identified]       Patient's PCP: Beryle Boast, MD    Admit Date: 10/18/2021     Discharge Date:  10/21/2021    Admitting Physician: Emeli Daly MD    Discharge Physician: Valerie Johnson MD     CONSULTANTS: Patient Care Team:  Beryle Boast, MD as PCP - General    PROCEDURES PERFORMED:     Active Discharge Diagnoses:  · Acute diverticulitis  · Intra-abdominal abscess  · Acute abdominal pain secondary to above  · Diabetes type 2      Primary Problem  <principal problem not specified>    Hospital Course:     60-year-old lady with past medical history of asthma, GERD, diabetes presented to ER after instruction from her surgeon. Per report patient had CT scanning abdomen done in [facility] clinic and her surgeon told her to go to the ER for admission. Patient has had a history of diverticulitis. She was having frequent bowel movement. Per report, CT scan shows left-sided colitis such as diverticulitis and fluid collection along the anterior margin of the left psoas and iliac is finding consistent with contained perforation or multifocal abscess. Patient has been complaining of left lower quadrant pain, moderate to severe. She denies any nausea, vomiting, chest pain, fever, chills, change in urination or rash. Patient admitted for further management with IV antibiotics blood cultures were followed which were negative. General surgery and ID  was involved in the care. Patient was initially on clear liquid diet with advance slowly. Patient tolerated diet well and nephrology. Patient is discharged home on p.o. Cipro and Flagyl for 14 days with recommendation to maintain an outpatient follow-up appointment with ID general surgery. At the time of discharge patient was hemodynamically stable and asymptomatic.     The plan was discussed in detail with patient who agreed with the plan and verbalized understanding . The patient was seen and examined on day of discharge and this discharge summary is in conjunction with any daily progress note from day of discharge. Hospital Data:    Labs:    Hematology:  Recent Labs     10/19/21  0743 10/20/21  0635 10/21/21  0656   WBC 5.0 8.1 7.5   RBC 3.97 3.84* 4.15   HGB 10.3* 10.0* 10.7*   HCT 34.0* 32.5* 35.9*   MCV 85.6 84.6 86.5   MCH 25.9 26.0 25.8   MCHC 30.3 30.8 29.8   RDW 15.2* 15.1* 15.3*   * 469* 515*   MPV 8.3 8.3 8.3   SEDRATE 88*  --   --    CRP 69.5*  --   --      Chemistry:  Recent Labs     10/19/21  0743 10/20/21  0635 10/21/21  0656    139 140   K 4.4 4.1 4.1    104 104   CO2 25 24 25   GLUCOSE 118* 138* 142*   BUN 8 6* 4*   CREATININE 0.50 0.59 0.57   ANIONGAP 11 11 11   LABGLOM >60 >60 >60   GFRAA >60 >60 >60   CALCIUM 9.0 8.6 8.8     Recent Labs     10/18/21  2140 10/19/21  0602 10/19/21  0743 10/19/21  1202 10/19/21  1603 10/19/21  1943 10/20/21  0630 10/20/21  1143 10/20/21  1630 10/21/21  0609   PROT 7.4  --   --   --   --   --   --   --   --   --    LABALBU 3.2*  --   --   --   --   --   --   --   --   --    LABA1C  --   --  6.6*  --   --   --   --   --   --   --    AST 25  --   --   --   --   --   --   --   --   --    ALT 20  --   --   --   --   --   --   --   --   --    ALKPHOS 110*  --   --   --   --   --   --   --   --   --    BILITOT 0.18*  --   --   --   --   --   --   --   --   --    POCGLU  --    < >  --    < > 104 123* 128* 116* 107* 127*    < > = values in this interval not displayed.      No results found for: INR, PROTIME  Lab Results   Component Value Date/Time    SPECIAL LEFT AC, 10ML 10/19/2021 10:15 AM     Lab Results   Component Value Date/Time    CULTURE NO GROWTH 2 DAYS 10/19/2021 10:15 AM       No results found for: POCPH, PHART, PH, POCPCO2, YKL9PNS, PCO2, POCPO2, PO2ART, PO2, POCHCO3, CES0TAD, HCO3, NBEA, PBEA, BEART, BE, THGBART, THB, JCH9MAP, VFKQ3CDY, E5IXXTVX, O2SAT, FIO2    Radiology:    No results found.       All radiological studies reviewed      Reviews of Symptoms:    A 10 point system is reviewed and  negative except described in hospital course    Physical Exam:    Vitals:  /68   Pulse 77   Temp 97.8 °F (36.6 °C) (Oral)   Resp 18   Ht 5' 8\" (1.727 m)   Wt 203 lb 2 oz (92.1 kg)   SpO2 100%   BMI 30.89 kg/m²   Temp (24hrs), Av.3 °F (36.8 °C), Min:97.5 °F (36.4 °C), Max:99.3 °F (37.4 °C)      General appearance - alert, well appearing, and in no acute distress  Mental status - oriented to person, place, and time with normal affect  Head - normocephalic and atraumatic  Eyes - pupils equal and reactive, extraocular eye movements intact, conjunctiva clear  Ears - hearing appears to be intact  Nose - no drainage noted  Mouth - mucous membranes moist  Neck - supple, no carotid bruits, thyroid not palpable  Chest - clear to auscultation, normal effort  Heart - normal rate, regular rhythm, no murmur  Abdomen - soft, nontender, nondistended, bowel sounds present all four quadrants, no masses, hepatomegaly or splenomegaly  Neurological - normal speech, no focal findings or movement disorder noted, cranial nerves II through XII grossly intact  Extremities - peripheral pulses palpable, no pedal edema or calf pain with palpation  Skin - no gross lesions, rashes, or induration noted      Consults:  IP CONSULT TO INTERNAL MEDICINE  IP CONSULT TO GENERAL SURGERY  IP CONSULT TO SOCIAL WORK  IP CONSULT TO INFECTIOUS DISEASES    Disposition: Home    Discharged Condition: Stable    Follow Up: Helen Lipscomb MD  [address] [place] [address]  [phone]    Schedule an appointment as soon as possible for a visit in 1 week  for re-evaluation      Lab Frequency Next Occurrence   Up as tolerated PRN    HYPOGLYCEMIA TREATMENT: blood glucose less than 50 mg/dL and patient  ALERT and TOLERATING PO PRN    HYPOGLYCEMIA TREATMENT: blood glucose less than 70 mg/dL and patient ALERT and be involved in this patient's care. This note was created with the assistance of a speech-recognition program.  Although the intention is to generate a document that actually reflects the content of the visit, no guarantees can be provided that every mistake has been identified and corrected by editing. Note was updated later by me after  physical examination and  completion of the assessment.

## 2021-10-21 NOTE — PROGRESS NOTES
[facility]  Progress Note    Laurie Chavis  MRN: [id number]  Date: 10/21/2021  LOS: 3     Reason for F/U :   Intra-abdominal abscess    Impression :   1. Recurrent diverticulitis with intra-abdominal abscess    Recommendations:   · Patient has been transitioned to oral ciprofloxacin and metronidazole per general surgery  · She is clinically doing much better, tolerated breakfast  · Patient is okay for discharge from an infectious disease standpoint    Infection Control Recommendations:   Universal precautions    Discharge Planning:     Patient will need Midline Catheter Insertion/ PICC line Insertion: No  Patient will need: Home IV , [place],  SNF,  LTAC: Undetermined  Patient willneed outpatient wound care: No    Medical Decision making / Summary of Stay:   Laurie Chavis is a 60y.o.-year-old female who was initially admitted on 10/18/2021. Kiesha Flor has a history of asthma, gastroesophageal reflux disease, arthritis, and she has had diverticulitis on a few occasions this year and has had 3 different courses of ciprofloxacin and metronidazole with the last course being in September of this year. The patient reports that she has had recurrent bouts of diverticulitis since about January 2021 and typically gets some abdominal discomfort. The patient went to follow-up with Dr. Reggie Powell and recently had Augmentin prescribed which she stopped on her fourth day of treatment due to pretty significant diarrhea. The patient had a CT scan of the abdomen done that demonstrated a left lower quadrant posterior abdominal abscess near the left psoas muscle likely related to a perforated diverticula abscess. The patient was advised to come into the emergency department due to the CT scan findings and the patient actually had some mild abdominal pain but was not having any fevers, chills, nausea, vomiting, or diarrhea.      The patient has been admitted started on IV antimicrobial therapy I was asked to evaluate some help with antibiotic choice. Current evaluation:10/21/2021      /68   Pulse 77   Temp 97.8 °F (36.6 °C) (Oral)   Resp 18   Ht 5' 8\" (1.727 m)   Wt 203 lb 2 oz (92.1 kg)   SpO2 100%   BMI 30.89 kg/m²     Temperature Range: Temp: 97.8 °F (36.6 °C) Temp  Av.3 °F (36.8 °C)  Min: 97.5 °F (36.4 °C)  Max: 99.3 °F (37.4 °C)    Patient seen and evaluated sitting in room, states she feels much better. Transition to full diet and tolerating it well  Denies abdominal pain or nausea    Review of Systems   Constitutional: Negative. HENT: Negative. Respiratory: Negative. Cardiovascular: Negative. Gastrointestinal: Negative for abdominal pain. Genitourinary: Negative. Musculoskeletal: Negative. Skin: Negative. Neurological: Negative. Psychiatric/Behavioral: Negative. Physical Examination :     Physical Exam  Constitutional:       General: She is not in acute distress. Appearance: Normal appearance. She is normal weight. HENT:      Head: Normocephalic and atraumatic. Pulmonary:      Effort: Pulmonary effort is normal. No respiratory distress. Abdominal:      General: Abdomen is flat. There is no distension. Palpations: Abdomen is soft. Tenderness: There is no abdominal tenderness. Musculoskeletal:         General: No swelling. Normal range of motion. Skin:     General: Skin is warm and dry. Neurological:      General: No focal deficit present. Mental Status: She is alert and oriented to person, place, and time. Mental status is at baseline. Psychiatric:         Mood and Affect: Mood normal.         Behavior: Behavior normal.         Thought Content:  Thought content normal.         Laboratory data:   I have independently reviewed the followinglabs:  CBC with Differential:   Recent Labs     10/20/21  0635 10/21/21  0656   WBC 8.1 7.5   HGB 10.0* 10.7*   HCT 32.5* 35.9*   * 515*   LYMPHOPCT 16* 21*   MONOPCT 9 8     BMP:   Recent Labs 10/20/21  0635 10/21/21  0656    140   K 4.1 4.1    104   CO2 24 25   BUN 6* 4*   CREATININE 0.59 0.57     Hepatic Function Panel:   Recent Labs     10/18/21  2140   PROT 7.4   LABALBU 3.2*   BILITOT 0.18*   ALKPHOS 110*   ALT 20   AST 25         Lab Results   Component Value Date    PROCAL 0.05 10/19/2021     Lab Results   Component Value Date    CRP 69.5 10/19/2021     Lab Results   Component Value Date    SEDRATE 88 (H) 10/19/2021         No results found for: DDIMER  No results found for: FERRITIN  No results found for: LDH  No results found for: FIBRINOGEN    No results found for requested labs within last 30 days. No results found for: COVID19    No results for input(s): VANCOTROUGH in the last 72 hours. Imaging Studies:   No new imaging    Cultures:     Culture, Blood 1 [[id number]] Collected: 10/19/21 1015   Order Status: Completed Specimen: Blood Updated: 10/21/21 0033    Specimen Description . BLOOD    Special Requests LEFT AC, 10ML    Culture NO GROWTH 2 DAYS   Culture, Blood 1 [[id number]] Collected: 10/19/21 0950   Order Status: Completed Specimen: Blood Updated: 10/21/21 0033    Specimen Description . BLOOD    Special Requests RAC,10ML    Culture NO GROWTH 2 DAYS       Medications:      ciprofloxacin  500 mg Oral 2 times per day    metroNIDAZOLE  500 mg Oral 3 times per day    insulin lispro  0-12 Units SubCUTAneous TID WC    insulin lispro  0-6 Units SubCUTAneous Nightly    pantoprazole  40 mg IntraVENous Daily    And    sodium chloride (PF)  10 mL IntraVENous Daily    enoxaparin  40 mg SubCUTAneous Daily    sodium chloride flush  5-40 mL IntraVENous 2 times per day           [facility]  TAMIKO Macias CNP  [employer]  OFFICE: [phone]      Electronically signed by TAMIKO Macias CNP on 10/21/2021 at 7:53 AM  Thank you for allowing us to participate in the care of this patient.  Please call with questions. This note iscreated with the assistance of a speech recognition program.  While intending to generate a document that actually reflects the content of the visit, the document can still have some errors including those of syntax andsound a like substitutions which may escape proof reading. In such instances, actual meaning can be extrapolated by contextual diversion.

## 2021-10-21 NOTE — PROGRESS NOTES
_______________________________________________________    GENERAL SURGERY     Progress Note    Subjective:  Patient seen and evaluated at bedside, no acute events overnight. Patient remains afebrile with normal vital signs. She is passing flatus, denies abdominal pain. Denies nausea and vomiting. Tolerated full liquid diet yesterday. Vitals:    10/21/21 0419   BP: 103/63   Pulse: 80   Resp: 19   Temp: 97.5 °F (36.4 °C)   SpO2: 98%        Objective:    Exam: General Appearance: alert and oriented to person, place and time, well developed and well- nourished, in no acute distress  Skin: warm and dry, no rash or erythema  Head: normocephalic and atraumatic  Eyes:extraocular eye movements intact, conjunctivae normal  ENT: trachea midline, nares patent  Pulmonary/Chest:normal effort, no distress  Cardiovascular: normal rate, regular rhythm,  Abdomen: soft, mild tenderness to LLQ, non-distended, no rebound tenderness   Extremities: no cyanosis, clubbing or edema  Musculoskeletal: normal range of motion, no joint swelling, deformity or tenderness  Neurologic: no cranial nerve deficit, gait, speech normal    I/O last 3 completed shifts: In: 3449.3 [P.O.:600;  I.V.:2699.3; IV Piggyback:150]  Out: -     Hemoglobin   Date Value Ref Range Status   10/21/2021 10.7 (L) 11.9 - 15.1 g/dL Final     Hematocrit   Date Value Ref Range Status   10/21/2021 35.9 (L) 36.3 - 47.1 % Final     WBC   Date Value Ref Range Status   10/21/2021 7.5 3.5 - 11.3 k/uL Final     Sodium   Date Value Ref Range Status   10/21/2021 140 135 - 144 mmol/L Final     Potassium   Date Value Ref Range Status   10/21/2021 4.1 3.7 - 5.3 mmol/L Final     Chloride   Date Value Ref Range Status   10/21/2021 104 98 - 107 mmol/L Final     CO2   Date Value Ref Range Status   10/21/2021 25 20 - 31 mmol/L Final     Glucose   Date Value Ref Range Status   10/21/2021 142 (H) 70 - 99 mg/dL Final         Assessment/Plan:      Kelvin Pereira, 59 yo F who presents with an intra-abdominal abscess secondary to diverticulitis after failed outpatient antibiotic therapy. 1. Continue medical and supportive care per primary  2. No acute surgical intervention at this time. Recommend transition to oral antibiotics. 3. Advance to low fiber diet. If patient tolerates breakfast and lunch appropriate for discharge this afternoon on p.o. antibiotics  4.  Follow-up with Dr. John Rizzo in 7 to 10 days      Electronically signed by Osmar Sarmiento MD on 10/21/2021 at 7:29 AM

## 2021-10-25 ENCOUNTER — HOSPITAL ENCOUNTER (INPATIENT)
Age: 66
LOS: 4 days | Discharge: HOME | DRG: 391 | End: 2021-10-29
Attending: EMERGENCY MEDICINE | Admitting: FAMILY MEDICINE
Payer: COMMERCIAL

## 2021-10-25 ENCOUNTER — APPOINTMENT (OUTPATIENT)
Dept: CT IMAGING | Age: 66
DRG: 391 | End: 2021-10-25
Payer: COMMERCIAL

## 2021-10-25 DIAGNOSIS — K65.1 INTRA-ABDOMINAL ABSCESS (HCC): ICD-10-CM

## 2021-10-25 DIAGNOSIS — K57.32 DIVERTICULITIS OF COLON: Primary | ICD-10-CM

## 2021-10-25 PROBLEM — K57.80 DIVERTICULITIS OF INTESTINE WITH ABSCESS: Status: ACTIVE | Noted: 2021-10-25

## 2021-10-25 LAB
-: ABNORMAL
ABSOLUTE EOS #: 0.03 K/UL (ref 0–0.44)
ABSOLUTE IMMATURE GRANULOCYTE: 0.07 K/UL (ref 0–0.3)
ABSOLUTE LYMPH #: 2.05 K/UL (ref 1.1–3.7)
ABSOLUTE MONO #: 1.26 K/UL (ref 0.1–1.2)
ALBUMIN SERPL-MCNC: 2.9 G/DL (ref 3.5–5.2)
ALBUMIN/GLOBULIN RATIO: ABNORMAL (ref 1–2.5)
ALP BLD-CCNC: 79 U/L (ref 35–104)
ALT SERPL-CCNC: 9 U/L (ref 5–33)
AMORPHOUS: ABNORMAL
ANION GAP SERPL CALCULATED.3IONS-SCNC: 10 MMOL/L (ref 9–17)
AST SERPL-CCNC: 12 U/L
BACTERIA: ABNORMAL
BASOPHILS # BLD: 0 % (ref 0–2)
BASOPHILS ABSOLUTE: 0.04 K/UL (ref 0–0.2)
BILIRUB SERPL-MCNC: 0.22 MG/DL (ref 0.3–1.2)
BILIRUBIN DIRECT: <0.08 MG/DL
BILIRUBIN URINE: ABNORMAL
BILIRUBIN, INDIRECT: ABNORMAL MG/DL (ref 0–1)
BUN BLDV-MCNC: 8 MG/DL (ref 8–23)
BUN/CREAT BLD: 16 (ref 9–20)
CALCIUM SERPL-MCNC: 8.7 MG/DL (ref 8.6–10.4)
CASTS UA: ABNORMAL /LPF
CHLORIDE BLD-SCNC: 100 MMOL/L (ref 98–107)
CO2: 23 MMOL/L (ref 20–31)
COLOR: YELLOW
COMMENT UA: ABNORMAL
CREAT SERPL-MCNC: 0.51 MG/DL (ref 0.5–0.9)
CRYSTALS, UA: ABNORMAL /HPF
CULTURE: NORMAL
CULTURE: NORMAL
DIFFERENTIAL TYPE: ABNORMAL
EOSINOPHILS RELATIVE PERCENT: 0 % (ref 1–4)
EPITHELIAL CELLS UA: ABNORMAL /HPF (ref 0–5)
GFR AFRICAN AMERICAN: >60 ML/MIN
GFR NON-AFRICAN AMERICAN: >60 ML/MIN
GFR SERPL CREATININE-BSD FRML MDRD: ABNORMAL ML/MIN/{1.73_M2}
GFR SERPL CREATININE-BSD FRML MDRD: ABNORMAL ML/MIN/{1.73_M2}
GLOBULIN: ABNORMAL G/DL (ref 1.5–3.8)
GLUCOSE BLD-MCNC: 143 MG/DL (ref 70–99)
GLUCOSE URINE: NEGATIVE
HCT VFR BLD CALC: 32.7 % (ref 36.3–47.1)
HEMOGLOBIN: 10.2 G/DL (ref 11.9–15.1)
IMMATURE GRANULOCYTES: 1 %
KETONES, URINE: ABNORMAL
LACTIC ACID: 1 MMOL/L (ref 0.5–2.2)
LEUKOCYTE ESTERASE, URINE: ABNORMAL
LIPASE: 24 U/L (ref 13–60)
LYMPHOCYTES # BLD: 16 % (ref 24–43)
Lab: NORMAL
Lab: NORMAL
MCH RBC QN AUTO: 26 PG (ref 25.2–33.5)
MCHC RBC AUTO-ENTMCNC: 31.2 G/DL (ref 28.4–34.8)
MCV RBC AUTO: 83.2 FL (ref 82.6–102.9)
MONOCYTES # BLD: 10 % (ref 3–12)
MUCUS: ABNORMAL
NITRITE, URINE: POSITIVE
NRBC AUTOMATED: 0 PER 100 WBC
OTHER OBSERVATIONS UA: ABNORMAL
PDW BLD-RTO: 15.2 % (ref 11.8–14.4)
PH UA: 5.5 (ref 5–8)
PLATELET # BLD: 507 K/UL (ref 138–453)
PLATELET ESTIMATE: ABNORMAL
PMV BLD AUTO: 8.8 FL (ref 8.1–13.5)
POTASSIUM SERPL-SCNC: 3.9 MMOL/L (ref 3.7–5.3)
PROTEIN UA: ABNORMAL
RBC # BLD: 3.93 M/UL (ref 3.95–5.11)
RBC # BLD: ABNORMAL 10*6/UL
RBC UA: ABNORMAL /HPF (ref 0–2)
RENAL EPITHELIAL, UA: ABNORMAL /HPF
SEG NEUTROPHILS: 74 % (ref 36–65)
SEGMENTED NEUTROPHILS ABSOLUTE COUNT: 9.58 K/UL (ref 1.5–8.1)
SODIUM BLD-SCNC: 133 MMOL/L (ref 135–144)
SPECIFIC GRAVITY UA: 1.03 (ref 1–1.03)
SPECIMEN DESCRIPTION: NORMAL
SPECIMEN DESCRIPTION: NORMAL
TOTAL PROTEIN: 6.9 G/DL (ref 6.4–8.3)
TRICHOMONAS: ABNORMAL
TURBIDITY: ABNORMAL
URINE HGB: NEGATIVE
UROBILINOGEN, URINE: NORMAL
WBC # BLD: 13 K/UL (ref 3.5–11.3)
WBC # BLD: ABNORMAL 10*3/UL
WBC UA: ABNORMAL /HPF (ref 0–5)
YEAST: ABNORMAL

## 2021-10-25 PROCEDURE — 83690 ASSAY OF LIPASE: CPT

## 2021-10-25 PROCEDURE — 99284 EMERGENCY DEPT VISIT MOD MDM: CPT

## 2021-10-25 PROCEDURE — 87040 BLOOD CULTURE FOR BACTERIA: CPT

## 2021-10-25 PROCEDURE — 2060000000 HC ICU INTERMEDIATE R&B

## 2021-10-25 PROCEDURE — 80076 HEPATIC FUNCTION PANEL: CPT

## 2021-10-25 PROCEDURE — 2580000003 HC RX 258: Performed by: EMERGENCY MEDICINE

## 2021-10-25 PROCEDURE — 6360000002 HC RX W HCPCS: Performed by: EMERGENCY MEDICINE

## 2021-10-25 PROCEDURE — 85025 COMPLETE CBC W/AUTO DIFF WBC: CPT

## 2021-10-25 PROCEDURE — 80048 BASIC METABOLIC PNL TOTAL CA: CPT

## 2021-10-25 PROCEDURE — 96365 THER/PROPH/DIAG IV INF INIT: CPT

## 2021-10-25 PROCEDURE — 81001 URINALYSIS AUTO W/SCOPE: CPT

## 2021-10-25 PROCEDURE — XW03396 INTRODUCTION OF CEFTOLOZANE/TAZOBACTAM ANTI-INFECTIVE INTO PERIPHERAL VEIN, PERCUTANEOUS APPROACH, NEW TECHNOLOGY GROUP 6: ICD-10-PCS | Performed by: FAMILY MEDICINE

## 2021-10-25 PROCEDURE — 6360000004 HC RX CONTRAST MEDICATION: Performed by: EMERGENCY MEDICINE

## 2021-10-25 PROCEDURE — 74177 CT ABD & PELVIS W/CONTRAST: CPT

## 2021-10-25 PROCEDURE — 96375 TX/PRO/DX INJ NEW DRUG ADDON: CPT

## 2021-10-25 PROCEDURE — 83605 ASSAY OF LACTIC ACID: CPT

## 2021-10-25 RX ORDER — SODIUM CHLORIDE 0.9 % (FLUSH) 0.9 %
10 SYRINGE (ML) INJECTION PRN
Status: DISCONTINUED | OUTPATIENT
Start: 2021-10-25 | End: 2021-10-25

## 2021-10-25 RX ORDER — ONDANSETRON 2 MG/ML
4 INJECTION INTRAMUSCULAR; INTRAVENOUS EVERY 6 HOURS PRN
Status: DISCONTINUED | OUTPATIENT
Start: 2021-10-25 | End: 2021-10-29 | Stop reason: HOSPADM

## 2021-10-25 RX ORDER — 0.9 % SODIUM CHLORIDE 0.9 %
1000 INTRAVENOUS SOLUTION INTRAVENOUS ONCE
Status: COMPLETED | OUTPATIENT
Start: 2021-10-25 | End: 2021-10-25

## 2021-10-25 RX ORDER — SODIUM CHLORIDE 9 MG/ML
25 INJECTION, SOLUTION INTRAVENOUS PRN
Status: DISCONTINUED | OUTPATIENT
Start: 2021-10-25 | End: 2021-10-26

## 2021-10-25 RX ORDER — SODIUM CHLORIDE 0.9 % (FLUSH) 0.9 %
5-40 SYRINGE (ML) INJECTION PRN
Status: DISCONTINUED | OUTPATIENT
Start: 2021-10-25 | End: 2021-10-26

## 2021-10-25 RX ORDER — ONDANSETRON 4 MG/1
4 TABLET, ORALLY DISINTEGRATING ORAL EVERY 8 HOURS PRN
Status: DISCONTINUED | OUTPATIENT
Start: 2021-10-25 | End: 2021-10-29 | Stop reason: HOSPADM

## 2021-10-25 RX ORDER — 0.9 % SODIUM CHLORIDE 0.9 %
80 INTRAVENOUS SOLUTION INTRAVENOUS ONCE
Status: COMPLETED | OUTPATIENT
Start: 2021-10-25 | End: 2021-10-25

## 2021-10-25 RX ORDER — SODIUM CHLORIDE 0.9 % (FLUSH) 0.9 %
5-40 SYRINGE (ML) INJECTION EVERY 12 HOURS SCHEDULED
Status: DISCONTINUED | OUTPATIENT
Start: 2021-10-25 | End: 2021-10-26

## 2021-10-25 RX ORDER — ONDANSETRON 2 MG/ML
4 INJECTION INTRAMUSCULAR; INTRAVENOUS ONCE
Status: COMPLETED | OUTPATIENT
Start: 2021-10-25 | End: 2021-10-25

## 2021-10-25 RX ORDER — ACETAMINOPHEN 325 MG/1
650 TABLET ORAL EVERY 4 HOURS PRN
Status: DISCONTINUED | OUTPATIENT
Start: 2021-10-25 | End: 2021-10-26

## 2021-10-25 RX ADMIN — SODIUM CHLORIDE 1000 ML: 9 INJECTION, SOLUTION INTRAVENOUS at 18:30

## 2021-10-25 RX ADMIN — SODIUM CHLORIDE 80 ML: 9 INJECTION, SOLUTION INTRAVENOUS at 19:47

## 2021-10-25 RX ADMIN — SODIUM CHLORIDE, PRESERVATIVE FREE 10 ML: 5 INJECTION INTRAVENOUS at 19:47

## 2021-10-25 RX ADMIN — ONDANSETRON 4 MG: 2 INJECTION INTRAMUSCULAR; INTRAVENOUS at 18:55

## 2021-10-25 RX ADMIN — PIPERACILLIN SODIUM AND TAZOBACTAM SODIUM 4500 MG: 4; .5 INJECTION, POWDER, LYOPHILIZED, FOR SOLUTION INTRAVENOUS at 21:00

## 2021-10-25 RX ADMIN — IOPAMIDOL 75 ML: 755 INJECTION, SOLUTION INTRAVENOUS at 19:43

## 2021-10-25 ASSESSMENT — ENCOUNTER SYMPTOMS
EYE DISCHARGE: 0
COLOR CHANGE: 0
SHORTNESS OF BREATH: 0
SORE THROAT: 0
EYE REDNESS: 0
DIARRHEA: 0
RHINORRHEA: 0
ABDOMINAL PAIN: 1
NAUSEA: 0
VOMITING: 0
COUGH: 0

## 2021-10-25 NOTE — ED PROVIDER NOTES
EMERGENCY DEPARTMENT ENCOUNTER    Pt Name: Eris Shabazz  MRN: [id number]  [place] 1955  Date of evaluation: 10/25/21  CHIEF COMPLAINT       Chief Complaint   Patient presents with    Abdominal Pain     states diagnosis of diverticulitis in Feb.     HISTORY OF PRESENT ILLNESS   This is a 58-year-old female that presents with complaints of generalized weakness, generally not feeling well and she was sent here from her family [de-identified] office. The patient has a history of recurrent diverticulitis, not really improving on antibiotics. The family doctor sent her here because she looks much more ill than she typically does. The patient describes nausea, generalized weakness, low-grade fevers and generally not feeling well. REVIEW OF SYSTEMS     Review of Systems   Constitutional: Positive for chills and fever. HENT: Negative for rhinorrhea and sore throat. Eyes: Negative for discharge, redness and visual disturbance. Respiratory: Negative for cough and shortness of breath. Cardiovascular: Negative for chest pain, palpitations and leg swelling. Gastrointestinal: Positive for abdominal pain. Negative for diarrhea, nausea and vomiting. Genitourinary: Negative for dysuria and hematuria. Musculoskeletal: Negative for arthralgias, myalgias and neck pain. Skin: Negative for color change and rash. Neurological: Positive for weakness. Negative for seizures and headaches. Psychiatric/Behavioral: Negative for hallucinations, self-injury and suicidal ideas.      PASTMEDICAL HISTORY     Past Medical History:   Diagnosis Date    Arthritis     knee, back    Asthma     GERD (gastroesophageal reflux disease)      Past Problem List  Patient Active Problem List   Diagnosis Code    Incisional hernia K43.2    [facility] K65.1    Diverticulitis of intestine with abscess K57.80     SURGICAL HISTORY       Past Surgical History:   Procedure Laterality Date    COLONOSCOPY  HERNIA REPAIR      umbilical    HERNIA REPAIR  07/19/2016    incisional with mesh, robotic converted to open    TONSILLECTOMY       CURRENT MEDICATIONS       Previous Medications    ALBUTEROL SULFATE  (90 BASE) MCG/ACT INHALER    Inhale 2 puffs into the lungs every 6 hours as needed for Wheezing    CIPROFLOXACIN (CIPRO) 500 MG TABLET    Take 1 tablet by mouth every 12 hours for 27 doses    METFORMIN (GLUCOPHAGE-XR) 500 MG EXTENDED RELEASE TABLET    Take 500 mg by mouth 2 times daily (with meals)     METRONIDAZOLE (FLAGYL) 500 MG TABLET    Take 1 tablet by mouth every 8 hours for 14 days    MULTIPLE VITAMINS-MINERALS (THERAPEUTIC MULTIVITAMIN-MINERALS) TABLET    Take 1 tablet by mouth daily    OMEPRAZOLE (PRILOSEC) 40 MG CAPSULE    Take 40 mg by mouth daily as needed     ROSUVASTATIN (CRESTOR) 5 MG TABLET    Take 5 mg by mouth nightly     ALLERGIES     is allergic to sulfa antibiotics. FAMILY HISTORY     has no family status information on file. SOCIAL HISTORY       Social History     Tobacco Use    Smoking status: Former Smoker     Packs/day: 0.25     Years: 3.00     Pack years: 0.75     Types: Cigarettes    Smokeless tobacco: Never Used    Tobacco comment: quit smoking at age 22   Vaping Use    Vaping Use: Never used   Substance Use Topics    Alcohol use: Yes     Comment: occassionally    Drug use: No     PHYSICAL EXAM     INITIAL VITALS: /72   Pulse 84   Temp 98.5 °F (36.9 °C) (Oral)   Resp 19   Ht 5' 8\" (1.727 m)   Wt 195 lb (88.5 kg)   SpO2 97%   BMI 29.65 kg/m²    Physical Exam  Constitutional:       Appearance: Normal appearance. She is well-developed. She is ill-appearing. She is not toxic-appearing. HENT:      Head: Normocephalic and atraumatic. Eyes:      Conjunctiva/sclera: Conjunctivae normal.      Pupils: Pupils are equal, round, and reactive to light. Neck:      Trachea: Trachea normal.   Cardiovascular:      Rate and Rhythm: Normal rate and regular rhythm. Heart sounds: S1 normal and S2 normal. No murmur heard. Pulmonary:      Effort: Pulmonary effort is normal. No accessory muscle usage or respiratory distress. Breath sounds: Normal breath sounds. Chest:      Chest wall: No deformity or tenderness. Abdominal:      General: Bowel sounds are normal. There is no distension or abdominal bruit. Palpations: Abdomen is not rigid. Tenderness: There is generalized abdominal tenderness. There is no guarding or rebound. Negative signs include Park's sign and McBurney's sign. Musculoskeletal:      Cervical back: Normal range of motion and neck supple. Skin:     General: Skin is warm. Findings: No rash. Neurological:      Mental Status: She is alert and oriented to person, place, and time. GCS: GCS eye subscore is 4. GCS verbal subscore is 5. GCS motor subscore is 6. Psychiatric:         Speech: Speech normal.         MEDICAL DECISION MAKIN-year-old presents with complaints of generalized abdominal pain, generalized weakness, she was sent here by her family doctor's office for decreased blood pressure and looking ill. We will obtain basic labs lactic acid blood cultures and reevaluation. We will also likely obtain a CT scan. 9:30 PM EDT  Patient has evidence of multiple fistulas and possible abscesses. I called and discussed the case with surgery, they recommended Zosyn and admission to the hospitalist service, hospitalist agreed with admission and requested I place orders for admission to the ICU. CRITICAL CARE:       PROCEDURES:    Procedures    DIAGNOSTIC RESULTS   EKG:All EKG's are interpreted by the Emergency Department Physician who either signs or Co-signs this chart in the absence of a cardiologist.        RADIOLOGY:All plain film, CT, MRI, and formal ultrasound images (except ED bedside ultrasound) are read by the radiologist, see reports below, unless otherwisenoted in MDM or here.   CT ABDOMEN PELVIS W IV CONTRAST Additional Contrast? None   Final Result   1. Acute complicated diverticulitis involving the distal descending and   proximal sigmoid colon, with multiple fistulas between the inflamed areas of   bowel. A small intraperitoneal abscess is present within the left lower   quadrant, measuring 2.2 x 1.7 cm.   2. Enlargement of the left iliopsoas muscle, within the mid to lower pelvis,   and extending to the left groin region. Phlegmonous changes are present,   with a small abscess within the iliopsoas muscle anterior to the hip joint,   and within the enlarged left pectineus muscle   3. Inflamed diverticulum involving the distal transverse colon, without   evidence of free air or abscess formation in this area. LABS: All lab results were reviewed by myself, and all abnormals are listed below. Labs Reviewed   BASIC METABOLIC PANEL - Abnormal; Notable for the following components:       Result Value    Glucose 143 (*)     Sodium 133 (*)     All other components within normal limits   CBC WITH AUTO DIFFERENTIAL - Abnormal; Notable for the following components:    WBC 13.0 (*)     RBC 3.93 (*)     Hemoglobin 10.2 (*)     Hematocrit 32.7 (*)     RDW 15.2 (*)     Platelets 529 (*)     Seg Neutrophils 74 (*)     Lymphocytes 16 (*)     Eosinophils % 0 (*)     Immature Granulocytes 1 (*)     Segs Absolute 9.58 (*)     Absolute Mono # 1.26 (*)     All other components within normal limits   HEPATIC FUNCTION PANEL - Abnormal; Notable for the following components:    Albumin 2.9 (*)     Total Bilirubin 0.22 (*)     All other components within normal limits   URINALYSIS - Abnormal; Notable for the following components:    Turbidity UA Cloudy (*)     Bilirubin Urine   (*)     Value: Presumptive positive. Unable to confirm due to unavailability of reagent.     Ketones, Urine 2+ (*)     Specific Gravity, UA 1.035 (*)     Protein, UA TRACE (*)     Nitrite, Urine POSITIVE (*)     Leukocyte Esterase, Urine SMALL (*) All other components within normal limits   MICROSCOPIC URINALYSIS - Abnormal; Notable for the following components:    Crystals, UA 10 TO 20 CALCIUM OXALATE (*)     Bacteria, UA MANY (*)     Mucus, UA 1+ (*)     All other components within normal limits   CULTURE, BLOOD 1   CULTURE, BLOOD 1   LIPASE   LACTIC ACID   LACTIC ACID       EMERGENCY DEPARTMENTCOURSE:         Vitals:    Vitals:    10/25/21 1635 10/25/21 1900 10/25/21 2000 10/25/21 2115   BP: 98/64 101/71 108/70 105/72   Pulse: 109 89 87 84   Resp: 18 22 21 19   Temp: 98.5 °F (36.9 °C)      TempSrc: Oral      SpO2: 98% 96% 97% 97%   Weight: 195 lb (88.5 kg)      Height: 5' 8\" (1.727 m)          The patient was given the following medications while in the emergency department:  Orders Placed This Encounter   Medications    ondansetron (ZOFRAN) injection 4 mg    0.9 % sodium chloride bolus    iopamidol (ISOVUE-370) 76 % injection 75 mL    sodium chloride flush 0.9 % injection 10 mL    0.9 % sodium chloride bolus    piperacillin-tazobactam (ZOSYN) 4,500 mg in dextrose 5 % 100 mL IVPB (mini-bag)     Order Specific Question:   Antimicrobial Indications     Answer:   Intra-Abdominal Infection     CONSULTS:  IP CONSULT TO HOSPITALIST  IP CONSULT TO GENERAL SURGERY    FINAL IMPRESSION      1. Diverticulitis of colon    2. Intra-abdominal abscess [facility])          DISPOSITION/PLAN   DISPOSITION Admitted 10/25/2021 09:30:16 PM      PATIENT REFERRED TO:  No follow-up provider specified. DISCHARGE MEDICATIONS:  New Prescriptions    No medications on file     The care is provided during an unprecedented national emergency due to the novel coronavirus, COVID 19.   Waqas Betancur MD  Attending Emergency Physician                   Waqas Betancur MD  10/25/21 1581

## 2021-10-26 LAB
ABSOLUTE EOS #: 0.11 K/UL (ref 0–0.44)
ABSOLUTE IMMATURE GRANULOCYTE: 0.02 K/UL (ref 0–0.3)
ABSOLUTE LYMPH #: 1.18 K/UL (ref 1.1–3.7)
ABSOLUTE MONO #: 0.64 K/UL (ref 0.1–1.2)
ANION GAP SERPL CALCULATED.3IONS-SCNC: 9 MMOL/L (ref 9–17)
BASOPHILS # BLD: 1 % (ref 0–2)
BASOPHILS ABSOLUTE: 0.03 K/UL (ref 0–0.2)
BUN BLDV-MCNC: 5 MG/DL (ref 8–23)
BUN/CREAT BLD: 10 (ref 9–20)
CALCIUM SERPL-MCNC: 8.2 MG/DL (ref 8.6–10.4)
CHLORIDE BLD-SCNC: 105 MMOL/L (ref 98–107)
CO2: 24 MMOL/L (ref 20–31)
CREAT SERPL-MCNC: 0.48 MG/DL (ref 0.5–0.9)
DIFFERENTIAL TYPE: ABNORMAL
EOSINOPHILS RELATIVE PERCENT: 2 % (ref 1–4)
GFR AFRICAN AMERICAN: >60 ML/MIN
GFR NON-AFRICAN AMERICAN: >60 ML/MIN
GFR SERPL CREATININE-BSD FRML MDRD: ABNORMAL ML/MIN/{1.73_M2}
GFR SERPL CREATININE-BSD FRML MDRD: ABNORMAL ML/MIN/{1.73_M2}
GLUCOSE BLD-MCNC: 123 MG/DL (ref 70–99)
HCT VFR BLD CALC: 29.3 % (ref 36.3–47.1)
HEMOGLOBIN: 8.9 G/DL (ref 11.9–15.1)
IMMATURE GRANULOCYTES: 0 %
INR BLD: 1.3
LYMPHOCYTES # BLD: 21 % (ref 24–43)
MCH RBC QN AUTO: 25.8 PG (ref 25.2–33.5)
MCHC RBC AUTO-ENTMCNC: 30.4 G/DL (ref 28.4–34.8)
MCV RBC AUTO: 84.9 FL (ref 82.6–102.9)
MONOCYTES # BLD: 12 % (ref 3–12)
NRBC AUTOMATED: 0 PER 100 WBC
PDW BLD-RTO: 15.4 % (ref 11.8–14.4)
PLATELET # BLD: 392 K/UL (ref 138–453)
PLATELET ESTIMATE: ABNORMAL
PMV BLD AUTO: 8.5 FL (ref 8.1–13.5)
POTASSIUM SERPL-SCNC: 3.8 MMOL/L (ref 3.7–5.3)
PROTHROMBIN TIME: 16 SEC (ref 11.5–14.2)
RBC # BLD: 3.45 M/UL (ref 3.95–5.11)
RBC # BLD: ABNORMAL 10*6/UL
SEG NEUTROPHILS: 64 % (ref 36–65)
SEGMENTED NEUTROPHILS ABSOLUTE COUNT: 3.59 K/UL (ref 1.5–8.1)
SODIUM BLD-SCNC: 138 MMOL/L (ref 135–144)
WBC # BLD: 5.6 K/UL (ref 3.5–11.3)
WBC # BLD: ABNORMAL 10*3/UL

## 2021-10-26 PROCEDURE — 2580000003 HC RX 258: Performed by: FAMILY MEDICINE

## 2021-10-26 PROCEDURE — 2580000003 HC RX 258: Performed by: NURSE PRACTITIONER

## 2021-10-26 PROCEDURE — 36415 COLL VENOUS BLD VENIPUNCTURE: CPT

## 2021-10-26 PROCEDURE — 85025 COMPLETE CBC W/AUTO DIFF WBC: CPT

## 2021-10-26 PROCEDURE — 2060000000 HC ICU INTERMEDIATE R&B

## 2021-10-26 PROCEDURE — 6360000002 HC RX W HCPCS: Performed by: NURSE PRACTITIONER

## 2021-10-26 PROCEDURE — 99222 1ST HOSP IP/OBS MODERATE 55: CPT | Performed by: NURSE PRACTITIONER

## 2021-10-26 PROCEDURE — 6370000000 HC RX 637 (ALT 250 FOR IP): Performed by: FAMILY MEDICINE

## 2021-10-26 PROCEDURE — 85610 PROTHROMBIN TIME: CPT

## 2021-10-26 PROCEDURE — 80048 BASIC METABOLIC PNL TOTAL CA: CPT

## 2021-10-26 RX ORDER — SODIUM CHLORIDE 9 MG/ML
25 INJECTION, SOLUTION INTRAVENOUS PRN
Status: DISCONTINUED | OUTPATIENT
Start: 2021-10-26 | End: 2021-10-29 | Stop reason: HOSPADM

## 2021-10-26 RX ORDER — ALBUTEROL SULFATE 90 UG/1
2 AEROSOL, METERED RESPIRATORY (INHALATION) EVERY 4 HOURS PRN
Status: DISCONTINUED | OUTPATIENT
Start: 2021-10-26 | End: 2021-10-29 | Stop reason: HOSPADM

## 2021-10-26 RX ORDER — ROSUVASTATIN CALCIUM 5 MG/1
5 TABLET, COATED ORAL NIGHTLY
Status: DISCONTINUED | OUTPATIENT
Start: 2021-10-26 | End: 2021-10-29 | Stop reason: HOSPADM

## 2021-10-26 RX ORDER — ACETAMINOPHEN 650 MG/1
650 SUPPOSITORY RECTAL EVERY 6 HOURS PRN
Status: DISCONTINUED | OUTPATIENT
Start: 2021-10-26 | End: 2021-10-29 | Stop reason: HOSPADM

## 2021-10-26 RX ORDER — SODIUM CHLORIDE 0.9 % (FLUSH) 0.9 %
10 SYRINGE (ML) INJECTION PRN
Status: DISCONTINUED | OUTPATIENT
Start: 2021-10-26 | End: 2021-10-29 | Stop reason: HOSPADM

## 2021-10-26 RX ORDER — POTASSIUM CHLORIDE 20 MEQ/1
40 TABLET, EXTENDED RELEASE ORAL PRN
Status: DISCONTINUED | OUTPATIENT
Start: 2021-10-26 | End: 2021-10-29 | Stop reason: HOSPADM

## 2021-10-26 RX ORDER — ZOLPIDEM TARTRATE 5 MG/1
5 TABLET ORAL NIGHTLY PRN
Status: DISCONTINUED | OUTPATIENT
Start: 2021-10-26 | End: 2021-10-29 | Stop reason: HOSPADM

## 2021-10-26 RX ORDER — M-VIT,TX,IRON,MINS/CALC/FOLIC 27MG-0.4MG
1 TABLET ORAL DAILY
Status: DISCONTINUED | OUTPATIENT
Start: 2021-10-26 | End: 2021-10-29 | Stop reason: HOSPADM

## 2021-10-26 RX ORDER — HYDROCODONE BITARTRATE AND ACETAMINOPHEN 5; 325 MG/1; MG/1
1 TABLET ORAL EVERY 6 HOURS PRN
Status: DISCONTINUED | OUTPATIENT
Start: 2021-10-26 | End: 2021-10-29 | Stop reason: HOSPADM

## 2021-10-26 RX ORDER — ALBUTEROL SULFATE 90 UG/1
2 AEROSOL, METERED RESPIRATORY (INHALATION) EVERY 6 HOURS PRN
Status: DISCONTINUED | OUTPATIENT
Start: 2021-10-26 | End: 2021-10-26

## 2021-10-26 RX ORDER — POTASSIUM CHLORIDE 7.45 MG/ML
10 INJECTION INTRAVENOUS PRN
Status: DISCONTINUED | OUTPATIENT
Start: 2021-10-26 | End: 2021-10-29 | Stop reason: HOSPADM

## 2021-10-26 RX ORDER — SODIUM CHLORIDE 9 MG/ML
INJECTION, SOLUTION INTRAVENOUS CONTINUOUS
Status: DISCONTINUED | OUTPATIENT
Start: 2021-10-26 | End: 2021-10-29 | Stop reason: HOSPADM

## 2021-10-26 RX ORDER — MAGNESIUM SULFATE 1 G/100ML
1000 INJECTION INTRAVENOUS PRN
Status: DISCONTINUED | OUTPATIENT
Start: 2021-10-26 | End: 2021-10-29 | Stop reason: HOSPADM

## 2021-10-26 RX ORDER — ACETAMINOPHEN 325 MG/1
650 TABLET ORAL EVERY 6 HOURS PRN
Status: DISCONTINUED | OUTPATIENT
Start: 2021-10-26 | End: 2021-10-29 | Stop reason: HOSPADM

## 2021-10-26 RX ORDER — SODIUM CHLORIDE 0.9 % (FLUSH) 0.9 %
5-40 SYRINGE (ML) INJECTION EVERY 12 HOURS SCHEDULED
Status: DISCONTINUED | OUTPATIENT
Start: 2021-10-26 | End: 2021-10-29 | Stop reason: HOSPADM

## 2021-10-26 RX ORDER — METFORMIN HYDROCHLORIDE 500 MG/1
500 TABLET, EXTENDED RELEASE ORAL 2 TIMES DAILY WITH MEALS
Status: DISCONTINUED | OUTPATIENT
Start: 2021-10-26 | End: 2021-10-29 | Stop reason: HOSPADM

## 2021-10-26 RX ORDER — MORPHINE SULFATE 2 MG/ML
1 INJECTION, SOLUTION INTRAMUSCULAR; INTRAVENOUS EVERY 4 HOURS PRN
Status: DISCONTINUED | OUTPATIENT
Start: 2021-10-26 | End: 2021-10-29 | Stop reason: HOSPADM

## 2021-10-26 RX ADMIN — PIPERACILLIN AND TAZOBACTAM 3375 MG: 3; .375 INJECTION, POWDER, LYOPHILIZED, FOR SOLUTION INTRAVENOUS at 17:22

## 2021-10-26 RX ADMIN — SODIUM CHLORIDE: 9 INJECTION, SOLUTION INTRAVENOUS at 00:49

## 2021-10-26 RX ADMIN — PIPERACILLIN AND TAZOBACTAM 3375 MG: 3; .375 INJECTION, POWDER, LYOPHILIZED, FOR SOLUTION INTRAVENOUS at 08:29

## 2021-10-26 RX ADMIN — PIPERACILLIN AND TAZOBACTAM 3375 MG: 3; .375 INJECTION, POWDER, LYOPHILIZED, FOR SOLUTION INTRAVENOUS at 23:39

## 2021-10-26 RX ADMIN — MULTIPLE VITAMINS W/ MINERALS TAB 1 TABLET: TAB at 08:29

## 2021-10-26 ASSESSMENT — PAIN SCALES - GENERAL
PAINLEVEL_OUTOF10: 0
PAINLEVEL_OUTOF10: 0

## 2021-10-26 NOTE — H&P
History & Physical  [facility]    Adult Hospitalist      Name: Georgina Leung  MRN: [id number]     Acct: [de-identified]  Room: [place] [address]    Admit Date: 10/25/2021  5:25 PM  PCP: Sheri Dowell MD    Primary Problem  Active Problems:    Diverticulitis of intestine with abscess  Resolved Problems:    * No resolved hospital problems. *        Assesment:     · Acute diverticulitis  · Intra-abdominal abscess  · Acute cystitis without hematuria  · Mixed hyperlipidemia  · Intermittent asthma  · Diabetes mellitus type 2  · Acute blood loss anemia  · Hyponatremia  · Hypoalbuminemia  · Acute dehydration-resolved        Plan:     · Admit progressive  · Monitor vitals closely  · Keep SPO2 above 90%  · I's and O's  · IV fluids  · Pain control  · N.p.o.  · Advance diet if okay with surgery  · Zosyn IV  · Antiemetics as needed  · Surgery consult  · ID consult  · CBC, BMP  · DVT and GI prophylaxis. Chief Complaint:     Chief Complaint   Patient presents with    Abdominal Pain     states diagnosis of diverticulitis in Feb.         History of Present Illness:      Georgina Leung is a 73 y.o.  female who presents with Abdominal Pain (states diagnosis of diverticulitis in Feb.)    Patient admitted through the emergency room where she presented with abdominal pain. Patient says she had been feeling weak for several days. She had fever yesterday and was seen by her PCP Dr. Rosita Dixon in her office. Patient has prior history of recurrent diverticulitis. Dr. Rosita Dixon started her on oral antibiotics though she continued to worsen with her symptoms. Patient says she continued to have nausea and her weakness progressed. Patient was initially seen to have low blood pressure because of which a decision was made to admit to her ICU. Patient was monitored and given IV fluids. She was able to ambulate without any dizziness.   Patient's brother is at bedside and says her blood pressure has remained systolic between  predominantly. She does not have any tachycardia and she does not have any clinical dehydration after initial IV fluids. Her heart rate has been in the 60s and 70s presently    Patient has had a bowel movement in the emergency room which showed hematochezia. She says the amount of blood has reduced over the last few days. Patient remained hemodynamically stable and then was able to tolerate her diet after which she was downgraded to progressive status. Patient denies any chest pain, dyspnea or orthopnea. Denies any headache, vision change or neck pain. Denies any vomiting or back pain. I have personally reviewed the past medical history, past surgical history, medications, social history, and family history, and summarized in the note. Review of Systems:     All 10 point system is reviewed and negative otherwise mentioned in HPI. Past Medical History:     Past Medical History:   Diagnosis Date    Arthritis     knee, back    Asthma     GERD (gastroesophageal reflux disease)         Past Surgical History:     Past Surgical History:   Procedure Laterality Date    COLONOSCOPY      HERNIA REPAIR      umbilical    HERNIA REPAIR  07/19/2016    incisional with mesh, robotic converted to open    TONSILLECTOMY          Medications Prior to Admission:       Prior to Admission medications    Medication Sig Start Date End Date Taking?  Authorizing Provider   ciprofloxacin (CIPRO) 500 MG tablet Take 1 tablet by mouth every 12 hours for 27 doses 10/21/21 11/4/21  Raphael Mcdermott MD   metroNIDAZOLE (FLAGYL) 500 MG tablet Take 1 tablet by mouth every 8 hours for 14 days 10/21/21 11/4/21  Raphael Mcdermott MD   rosuvastatin (CRESTOR) 5 MG tablet Take 5 mg by mouth nightly    Historical Provider, MD   metFORMIN (GLUCOPHAGE-XR) 500 MG extended release tablet Take 500 mg by mouth 2 times daily (with meals)  10/7/21   Historical Provider, MD   omeprazole (PRILOSEC) 40 MG capsule Take 40 mg by mouth daily as needed     Historical Provider, MD   Multiple Vitamins-Minerals (THERAPEUTIC MULTIVITAMIN-MINERALS) tablet Take 1 tablet by mouth daily    Historical Provider, MD   albuterol sulfate  (90 BASE) MCG/ACT inhaler Inhale 2 puffs into the lungs every 6 hours as needed for Wheezing    Historical Provider, MD        Allergies:       Sulfa antibiotics    Social History:     Tobacco:    reports that she has quit smoking. Her smoking use included cigarettes. She has a 0.75 pack-year smoking history. She has never used smokeless tobacco.  Alcohol:      reports current alcohol use. Drug Use:  reports no history of drug use. Family History:     History reviewed. No pertinent family history. Physical Exam:     Vitals:  /64   Pulse 87   Temp 98.5 °F (36.9 °C) (Oral)   Resp 18   Ht 5' 8\" (1.727 m)   Wt 195 lb (88.5 kg)   SpO2 98%   BMI 29.65 kg/m²   No data recorded.           General appearance - alert, well appearing, and in no acute distress  Mental status - oriented to person, place, and time with normal affect  Head - normocephalic and atraumatic  Eyes - pupils equal and reactive, extraocular eye movements intact, conjunctiva clear  Ears - hearing appears to be intact  Nose - no drainage noted  Mouth - mucous membranes moist  Neck - supple, no carotid bruits, thyroid not palpable  Chest - clear to auscultation, normal effort  Heart - normal rate, regular rhythm, no murmur  Abdomen - soft, tender mild, generalized, nondistended, bowel sounds present all four quadrants, no masses, hepatomegaly or splenomegaly  Neurological - normal speech, no focal findings or movement disorder noted, cranial nerves II through XII grossly intact  Extremities - peripheral pulses palpable, no pedal edema or calf pain with palpation  Skin - no gross lesions, rashes, or induration noted        Data:     Labs:    Hematology:  Recent Labs     10/25/21  1823 10/26/21  0554   WBC 13.0* 5.6   RBC 3.93* 3.45*   HGB 10.2* 8.9* HCT 32.7* 29.3*   MCV 83.2 84.9   MCH 26.0 25.8   MCHC 31.2 30.4   RDW 15.2* 15.4*   * 392   MPV 8.8 8.5   INR  --  1.3     Chemistry:  Recent Labs     10/25/21  1823 10/26/21  0554   * 138   K 3.9 3.8    105   CO2 23 24   GLUCOSE 143* 123*   BUN 8 5*   CREATININE 0.51 0.48*   ANIONGAP 10 9   LABGLOM >60 >60   GFRAA >60 >60   CALCIUM 8.7 8.2*     Recent Labs     10/25/21  1823   PROT 6.9   LABALBU 2.9*   AST 12   ALT 9   ALKPHOS 79   BILITOT 0.22*   BILIDIR <0.08   LIPASE 24       Lab Results   Component Value Date    INR 1.3 10/26/2021    PROTIME 16.0 (H) 10/26/2021       Lab Results   Component Value Date/Time    [facility] 10/25/2021 06:28 PM     Lab Results   Component Value Date/Time    CULTURE NO GROWTH 15 HOURS 10/25/2021 06:28 PM       No results found for: POCPH, PHART, PH, POCPCO2, FJG9WQG, PCO2, POCPO2, PO2ART, PO2, POCHCO3, TSY0YSD, HCO3, NBEA, PBEA, BEART, BE, THGBART, THB, OGB7PLQ, JDWS9UTB, H2UMMITS, O2SAT, FIO2    Radiology:    CT ABDOMEN PELVIS W IV CONTRAST Additional Contrast? None    Result Date: 10/25/2021  1. Acute complicated diverticulitis involving the distal descending and proximal sigmoid colon, with multiple fistulas between the inflamed areas of bowel. A small intraperitoneal abscess is present within the left lower quadrant, measuring 2.2 x 1.7 cm. 2. Enlargement of the left iliopsoas muscle, within the mid to lower pelvis, and extending to the left groin region. Phlegmonous changes are present, with a small abscess within the iliopsoas muscle anterior to the hip joint, and within the enlarged left pectineus muscle 3. Inflamed diverticulum involving the distal transverse colon, without evidence of free air or abscess formation in this area.          All radiological studies reviewed                Code Status:  Full Code    Electronically signed by Shukri Anderson MD on 10/26/2021 at 6:06 PM     Copy sent to Dr. Tiffanie Cronin MD    This note was created with the assistance of a speech-recognition program.  Although the intention is to generate a document that actually reflects the content of the visit, no guarantees can be provided that every mistake has been identified and corrected by editing. Note was updated later by me after  physical examination and  completion of the assessment.

## 2021-10-26 NOTE — CONSULTS
General Surgery:  Consult Note        PATIENT NAME: Tom Hadley   YOB: 1955    ADMISSION DATE: 10/25/2021  5:25 PM     Consulted Physician: Dr. Foster Chain: 10/26/2021    Chief Complaint:  Fatigue  Consult Regarding:  Recurrent diverticulitis with LLQ abscess    HISTORY OF PRESENT ILLNESS:  The patient is a 73 y.o. female  who was admitted on 10/26/21 with increasing fatigue, chills and left lower quadrant pain. Patient was recently admitted on 10/18/2021 due to recurrent diverticulitis with left lower quadrant abscess near the psoas muscle. Patient was treated with IV antibiotics and discharged on a p.o. regimen. Patient was seen by her primary care doctor yesterday who stated she appeared ill and needed to go to the hospital.  Repeat CT scanning found progression of diverticulitis findings , enlargement of the iliopsoas muscle with phlegmonous changes, and abscess of 2.2 x 1.7 cm. On evaluation, patient is afebrile with normal vital signs. She is complaining of mild left lower quadrant tenderness. She had a leukocytosis of 13.0 but is 5.6 this morning. Patient was found to have urinary tract infection with positive nitrate, leukocyte esterase and many bacteria. Past Medical History:        Diagnosis Date    Arthritis     knee, back    Asthma     GERD (gastroesophageal reflux disease)        Past Surgical History:        Procedure Laterality Date    COLONOSCOPY      HERNIA REPAIR      umbilical    HERNIA REPAIR  07/19/2016    incisional with mesh, robotic converted to open    TONSILLECTOMY         Medications Prior to Admission:   Not in a hospital admission.     Allergies:  Sulfa antibiotics    Social History:   Social History     Socioeconomic History    Marital status:      Spouse name: Not on file    Number of children: Not on file    Years of education: Not on file    Highest education level: Not on file   Occupational History    Not on file   Tobacco TIA.  Review of systems negative unless listed above. PHYSICAL EXAM:    VITALS:  /65   Pulse 73   Temp 98.5 °F (36.9 °C) (Oral)   Resp 19   Ht 5' 8\" (1.727 m)   Wt 195 lb (88.5 kg)   SpO2 98%   BMI 29.65 kg/m²   INTAKE/OUTPUT:     Intake/Output Summary (Last 24 hours) at 10/26/2021 0657  Last data filed at 10/25/2021 2130  Gross per 24 hour   Intake 100 ml   Output --   Net 100 ml       CONSTITUTIONAL:  awake, alert, fatigued, not distressed and mildly obese  HEENT: Normocephalic/atraumatic, without obvious abnormality. NECK:  Supple, symmetrical, trachea midline   CARDIOVASCULAR: Regular rate and rhythm   LUNGS: Clear to auscultation bilaterally without evidence of wheezing or tachypnea. ABDOMEN: Soft, nondistended, tenderness with deep palpation to left lower quadrant and suprapubic area  MUSCULOSKELETAL: Muscle strength intact in all extremities bilaterally. NEUROLOGIC: Gross motor intact without focal weakness. SKIN: No cyanosis, rashes, or edema noted.    Orientation:   oriented to person, place, and time    IV Access:  PIV    CBC with Differential:    Lab Results   Component Value Date    WBC 5.6 10/26/2021    RBC 3.45 10/26/2021    HGB 8.9 10/26/2021    HCT 29.3 10/26/2021     10/26/2021    MCV 84.9 10/26/2021    MCH 25.8 10/26/2021    MCHC 30.4 10/26/2021    RDW 15.4 10/26/2021    LYMPHOPCT 21 10/26/2021    MONOPCT 12 10/26/2021    BASOPCT 1 10/26/2021    MONOSABS 0.64 10/26/2021    LYMPHSABS 1.18 10/26/2021    EOSABS 0.11 10/26/2021    BASOSABS 0.03 10/26/2021    DIFFTYPE NOT REPORTED 10/26/2021     BMP:    Lab Results   Component Value Date     10/26/2021    K 3.8 10/26/2021     10/26/2021    CO2 24 10/26/2021    BUN 5 10/26/2021    LABALBU 2.9 10/25/2021    CREATININE 0.48 10/26/2021    CALCIUM 8.2 10/26/2021    GFRAA >60 10/26/2021    LABGLOM >60 10/26/2021    GLUCOSE 123 10/26/2021     U/A:    Lab Results   Component Value Date    COLORU Yellow 10/25/2021 PROTEINU TRACE 10/25/2021    PHUR 5.5 10/25/2021    WBCUA 20 TO 50 10/25/2021    RBCUA 2 TO 5 10/25/2021    MUCUS 1+ 10/25/2021    TRICHOMONAS NOT REPORTED 10/25/2021    YEAST NOT REPORTED 10/25/2021    BACTERIA MANY 10/25/2021    SPECGRAV 1.035 10/25/2021    LEUKOCYTESUR SMALL 10/25/2021    UROBILINOGEN Normal 10/25/2021    BILIRUBINUR  10/25/2021     Presumptive positive. Unable to confirm due to unavailability of reagent. GLUCOSEU NEGATIVE 10/25/2021    AMORPHOUS NOT REPORTED 10/25/2021       Pertinent Radiology:   CT ABDOMEN PELVIS W IV CONTRAST Additional Contrast? None    Result Date: 10/25/2021  EXAMINATION: CT OF THE ABDOMEN AND PELVIS WITH CONTRAST 10/25/2021 1:34 pm TECHNIQUE: CT of the abdomen and pelvis was performed with the administration of intravenous contrast. Multiplanar reformatted images are provided for review. Dose modulation, iterative reconstruction, and/or weight based adjustment of the mA/kV was utilized to reduce the radiation dose to as low as reasonably achievable. COMPARISON: None. HISTORY: ORDERING SYSTEM PROVIDED HISTORY: Diverticulitis, intraabdominal abscess TECHNOLOGIST PROVIDED HISTORY: Diverticulitis, intraabdominal abscess Decision Support Exception - unselect if not a suspected or confirmed emergency medical condition->Emergency Medical Condition (MA) Reason for Exam: Diverticulitis and diarrhea Acuity: Acute Type of Exam: Initial FINDINGS: Lower Chest: Mild bronchial wall thickening is present, suggesting bronchitis. No focal area of consolidation is present. Organs: The liver, spleen, gallbladder, pancreas, and adrenal glands demonstrate no acute abnormality. Cortical cyst formation is present on the kidneys. Punctate nonobstructing left intrarenal calculi are present. GI/Bowel: A small hiatal hernia is present. Small bowel appears normal without evidence of obstruction. The appendix is normal.  Numerous colonic diverticula are present.   Prominent wall thickening and pericolonic inflammation is present involving the sigmoid colon and distal descending colon. Linear areas of soft tissue attenuation are present within the adjacent mesentery, connecting the inflamed loops of bowel, suggesting multiple fistulous. Asymmetric enlargement of the left iliopsoas muscle is present. , with diffuse inflammation present. Small fluid collections with peripheral enhancement are present within the distal iliopsoas muscle, anterior to the hip joint. , and within the enlarged left pectineus muscle, consistent with intramuscular abscesses. Abscess within the left pectineus muscle measures 2.2 x 0.9 cm. Abscess within the left ileus psoas muscle measures approximately 13 mm. A 2.2 x 1.7 cm abscess is present within the mesenteric fat, left lower quadrant. Inflammation is present surrounding a diverticulum within the distal transverse colon, within the left upper quadrant. Pelvis: Urinary bladder appears normal.  Fibroid uterus is present. Peritoneum/Retroperitoneum: No free air, or free fluid is present within the abdomen or pelvis. Nonspecific retroperitoneal lymph nodes are noted, likely reactive. Bones/Soft Tissues: No acute osseous abnormality is present. 1. Acute complicated diverticulitis involving the distal descending and proximal sigmoid colon, with multiple fistulas between the inflamed areas of bowel. A small intraperitoneal abscess is present within the left lower quadrant, measuring 2.2 x 1.7 cm. 2. Enlargement of the left iliopsoas muscle, within the mid to lower pelvis, and extending to the left groin region. Phlegmonous changes are present, with a small abscess within the iliopsoas muscle anterior to the hip joint, and within the enlarged left pectineus muscle 3. Inflamed diverticulum involving the distal transverse colon, without evidence of free air or abscess formation in this area.          ASSESSMENT:  Active Hospital Problems    Diagnosis Date Noted    Diverticulitis of intestine with abscess [K57.80] 10/25/2021       1. Quincy Thrasher, 72 yo F with recurrent diverticulitis presents with phlegmonous changes to left lower quadrant iliopsoas muscles and 2.2 x 1.7 cm abscess as well as a urinary tract infection. Plan:  1. Continue medical mgmt and supportive care per primary  2. At this time, patient does not have any drainable fluid collections. Recommend management with IV antibiotics for diverticulitis and urinary tract infection. Will recommend infectious disease consultation for antibiotic optimization. 3. Patient is okay for clear liquid diet from general surgery standpoint. 4. Serial abdominal examinations, we will follow. Electronically signed by Liana Faulkner MD  on 10/26/2021 at 6:57 AM   Attending Physician Statement  I have discussed the case, including pertinent history and exam findings with the resident. I agree with the assessment, plan and orders as documented by the resident. Patient was sent to the ER yesterday from the PCP office as pt was not feeling well. CT reviewed. Patient examined.   Would recommend medical management at present followed by semielective sigmoid resection to prevent creation of colostomy

## 2021-10-26 NOTE — PROGRESS NOTES
Physical Therapy  DATE: 10/26/2021    NAME: Kelvin Pereira  MRN: [id number]   : 1955    Patient not seen this date for Physical Therapy due to:      [] Cancel by RN or physician due to:    [] Hemodialysis    [] Critical Lab Value Level     [] Blood transfusion in progress    [] Acute or unstable cardiovascular status   _MAP < 55 or more than >115  _HR < 40 or > 130    [] Acute or unstable pulmonary status   -FiO2 > 60%   _RR < 5 or >40    _O2 sats < 85%    [] Strict Bedrest    [] Off Unit for surgery or procedure    [] Off Unit for testing       [] Pending imaging to R/O fracture    [] Refusal by Patient      [] Other      [x] PT being discontinued at this time. Patient is currently independent with mobility/safety. No further needs. Please re-order if needs arise or patient has post op surgery needs. [] PT being discontinued at this time as the patient has been transferred to hospice care. No further needs.       Chele James, PT

## 2021-10-26 NOTE — PROGRESS NOTES
Occupational [address]  Occupational Therapy Not Seen Note    Patient not available for Occupational Therapy due to:    [] Testing:    [] Hemodialysis    [] Cancelled by RN:    []Refusal by Patient:    [] Surgery:     [] Intubation:     [] Pain Medication:    [] Sedation:     [] Spine Precautions :    [] Medical Instability:    [x] Other:10/26: Per pt and RN, pt is independent with no skilled OT needs at this time. Will discharge from OT at this time.       Kj Juarez, OT

## 2021-10-26 NOTE — CONSULTS
[facility]  Initial Consult Note  Date: 10/26/2021    Hospital day :1     Impression:   1. Recurrent diverticulitis with intra-abdominal abscess  2. Urinary tract infection    Recommendations   · Patient can continue on IV antimicrobial therapy with Zosyn  · Blood cultures are pending  · Check urine culture  · Await final plan from general surgery standpoint  · We will continue to follow clinical progress and culture data    Chief complaint/reason for consultation:   Intra-abdominal abscess    History of Present Illness:   Sammy Andres is a 73y.o.-year-old female who was initially admitted on 10/25/2021. Patient has no medical history of asthma, GERD, arthritis and diverticulitis on a few occasions over the past year. Patient is known to our practice from hospitalization earlier in October. At that time, she was admitted due to CT scan of the abdomen that revealed left lower quadrant posterior abdominal abscess near the left psoas muscle related to a perforated diverticular abscess. She was seen by general surgery at that time and it was not felt that she needed any acute surgical intervention at that time. She was discharged 10/21/2021 on oral antibiotics. Yesterday patient returned to the emergency department with complaints of fatigue, fevers and chills. She had gone to her family [de-identified] office and she was felt to not look well so they instructed her to come to the emergency department. Urinalysis positive nitrates, small leuk esterase. WBC 13.     CT abd/pelvis  Impression:        1.  Acute complicated diverticulitis involving the distal descending and   proximal sigmoid colon, with multiple fistulas between the inflamed areas of   bowel.  A small intraperitoneal abscess is present within the left lower   quadrant, measuring 2.2 x 1.7 cm.   2. Enlargement of the left iliopsoas muscle, within the mid to lower pelvis,   and extending to the left groin region.  Phlegmonous changes are present,   with a small abscess within the iliopsoas muscle anterior to the hip joint,   and within the enlarged left pectineus muscle   3. Inflamed diverticulum involving the distal transverse colon, without   evidence of free air or abscess formation in this area. Patient has been seen and evaluated by general surgery, she tells me that they do plan to do surgery once the inflammation has improved. Patient does tell me that she has been having some urinary discomfort and a sensation of not fully emptying her bladder. She has been initiated on Zosyn. We were consulted assist with antimicrobial therapy management. I have personally reviewed the past medical history, past surgical history, medications, social history, and family history, and I have updated the database accordingly.   Past Medical History:     Past Medical History:   Diagnosis Date    Arthritis     knee, back    Asthma     GERD (gastroesophageal reflux disease)      Past Surgical  History:     Past Surgical History:   Procedure Laterality Date    COLONOSCOPY      HERNIA REPAIR      umbilical    HERNIA REPAIR  07/19/2016    incisional with mesh, robotic converted to open    TONSILLECTOMY       Medications:      therapeutic multivitamin-minerals  1 tablet Oral Daily    metFORMIN  500 mg Oral BID WC    rosuvastatin  5 mg Oral Nightly    sodium chloride flush  5-40 mL IntraVENous 2 times per day    enoxaparin  40 mg SubCUTAneous Daily     Social History:     Social History     Socioeconomic History    Marital status:      Spouse name: Not on file    Number of children: Not on file    Years of education: Not on file    Highest education level: Not on file   Occupational History    Not on file   Tobacco Use    Smoking status: Former Smoker     Packs/day: 0.25     Years: 3.00     Pack years: 0.75     Types: Cigarettes    Smokeless tobacco: Never Used    Tobacco comment: quit smoking at age 22   Vaping Use    Vaping Use: °C)  Max: 98.5 °F (36.9 °C)  General Appearance: Awake, alert, and in no apparent distress  Head: Normocephalic, without obvious abnormality, atraumatic  Eyes: Pupils equal, round, reactive, to light and accommodation; extraocular movements intact; sclera anicteric; conjunctivae pink  ENT: Oropharynx clear, without erythema, exudate, or thrush. Neck: Supple, without lymphadenopathy. Pulmonary/Chest: Clear to auscultation, without wheezes, rales, or rhonchi  Cardiovascular: Regular rate and rhythm without murmurs, rubs, or gallops. Abdomen: Soft. Left lower quadrant tenderness. Extremities: No cyanosis, clubbing, edema, or effusions. Neurologic: No gross sensory or motor deficits. Skin: Warm and dry with no rash. Medical Decision Making:   I have independently reviewed/ordered the following labs:  CBC with Differential:   Recent Labs     10/25/21  1823 10/26/21  0554   WBC 13.0* 5.6   HGB 10.2* 8.9*   HCT 32.7* 29.3*   * 392   LYMPHOPCT 16* 21*   MONOPCT 10 12     BMP:   Recent Labs     10/25/21  1823 10/26/21  0554   * 138   K 3.9 3.8    105   CO2 23 24   BUN 8 5*   CREATININE 0.51 0.48*     Hepatic Function Panel:   Recent Labs     10/25/21  1823   PROT 6.9   LABALBU 2.9*   BILIDIR <0.08   IBILI CANNOT BE CALCULATED   BILITOT 0.22*   ALKPHOS 79   ALT 9   AST 12       Lab Results   Component Value Date    PROCAL 0.05 10/19/2021       Lab Results   Component Value Date    CRP 69.5 10/19/2021     No results found for: FERRITIN  No results found for: FIBRINOGEN  No results found for: DDIMER  No results found for: LDH    Lab Results   Component Value Date    SEDRATE 88 (H) 10/19/2021       No results found for: COVID19  No results found for requested labs within last 30 days. Imaging Studies:   CT ABDOMEN PELVIS W IV CONTRAST Additional Contrast? None    Result Date: 10/25/2021    Bones/Soft Tissues: No acute osseous abnormality is present.       Impression:       1.  Acute complicated diverticulitis involving the distal descending and   proximal sigmoid colon, with multiple fistulas between the inflamed areas of   bowel.  A small intraperitoneal abscess is present within the left lower   quadrant, measuring 2.2 x 1.7 cm.   2. Enlargement of the left iliopsoas muscle, within the mid to lower pelvis,   and extending to the left groin region.  Phlegmonous changes are present,   with a small abscess within the iliopsoas muscle anterior to the hip joint,   and within the enlarged left pectineus muscle   3. Inflamed diverticulum involving the distal transverse colon, without   evidence of free air or abscess formation in this area. Cultures:     Culture, Blood 1 [[id number]] Collected: 10/25/21 1824   Order Status: Completed Specimen: Blood Updated: 10/26/21 0109    Specimen Description . BLOOD    Special Requests 20CC    Culture NO GROWTH 1 HOUR   Culture, Blood 1 [[id number]] Collected: 10/25/21 1828   Order Status: Completed Specimen: Blood Updated: 10/26/21 0109    Specimen Description . BLOOD    Special Requests 20CC    Culture NO GROWTH 1 HOUR           Thank you for allowing us to participate in the care of this patient. Please call with questions. Electronically signed by TAMIKO Reynoso CNP on 10/26/2021 at 7:07 AM      [facility]  Chanell Calvert, [address] messaging  OFFICE: [phone]      This note is created with the assistance of a speech recognition program.  While intending to generate a document that actually reflects the content of the visit, the document can still have some errors including those of syntax and sound a like substitutions which may escape proof reading. In such instances, actual meaning can be extrapolated by contextual diversion.

## 2021-10-26 NOTE — RT PROTOCOL NOTE
RT Inhaler-Nebulizer Bronchodilator Protocol Note    There is a bronchodilator order in the chart from a provider indicating to follow the RT Bronchodilator Protocol and there is an Initiate RT Inhaler-Nebulizer Bronchodilator Protocol order as well (see protocol at bottom of note). CXR Findings:  No results found. The findings from the last RT Protocol Assessment were as follows:   History Pulmonary Disease: Chronic pulmonary disease (asthma)  Respiratory Pattern: Regular pattern and RR 12-20 bpm  Breath Sounds: Clear breath sounds  Cough: Strong, spontaneous, non-productive  Indication for Bronchodilator Therapy: On home bronchodilators (albuterol inhaler at home prn)  Bronchodilator Assessment Score: 2    Aerosolized bronchodilator medication orders have been revised according to the RT Inhaler-Nebulizer Bronchodilator Protocol below. Respiratory Therapist to perform RT Therapy Protocol Assessment initially then follow the protocol. Repeat RT Therapy Protocol Assessment PRN for score 0-3 or on second treatment, BID, and PRN for scores above 3. No Indications - adjust the frequency to every 6 hours PRN wheezing or bronchospasm, if no treatments needed after 48 hours then discontinue using Per Protocol order mode. If indication present, adjust the RT bronchodilator orders based on the Bronchodilator Assessment Score as indicated below. Use Inhaler orders unless patient has one or more of the following: on home nebulizer, not able to hold breath for 10 seconds, is not alert and oriented, cannot activate and use MDI correctly, or respiratory rate 25 breaths per minute or more, then use the equivalent nebulizer order(s) with same Frequency and PRN reasons based on the score. If a patient is on this medication at home then do not decrease Frequency below that used at home.     0-3 - enter or revise RT bronchodilator order(s) to equivalent RT Bronchodilator order with Frequency of every 4 hours PRN for wheezing or increased work of breathing using Per Protocol order mode. 4-6 - enter or revise RT Bronchodilator order(s) to two equivalent RT bronchodilator orders with one order with BID Frequency and one order with Frequency of every 4 hours PRN wheezing or increased work of breathing using Per Protocol order mode. 7-10 - enter or revise RT Bronchodilator order(s) to two equivalent RT bronchodilator orders with one order with TID Frequency and one order with Frequency of every 4 hours PRN wheezing or increased work of breathing using Per Protocol order mode. 11-13 - enter or revise RT Bronchodilator order(s) to one equivalent RT bronchodilator order with QID Frequency and an Albuterol order with Frequency of every 4 hours PRN wheezing or increased work of breathing using Per Protocol order mode. Greater than 13 - enter or revise RT Bronchodilator order(s) to one equivalent RT bronchodilator order with every 4 hours Frequency and an Albuterol order with Frequency of every 2 hours PRN wheezing or increased work of breathing using Per Protocol order mode. RT to enter RT Home Evaluation for COPD & MDI Assessment order using Per Protocol order mode.     Electronically signed by Aria Garcia RN on 10/26/2021 at 11:31 AM

## 2021-10-26 NOTE — ED NOTES
Pt resting comfortably in hospital bed     [facility] ([facility]), RN  10/26/21 [id number]

## 2021-10-27 LAB
ABSOLUTE EOS #: 0.11 K/UL (ref 0–0.44)
ABSOLUTE IMMATURE GRANULOCYTE: 0.03 K/UL (ref 0–0.3)
ABSOLUTE LYMPH #: 1.47 K/UL (ref 1.1–3.7)
ABSOLUTE MONO #: 0.47 K/UL (ref 0.1–1.2)
ANION GAP SERPL CALCULATED.3IONS-SCNC: 10 MMOL/L (ref 9–17)
BASOPHILS # BLD: 0 % (ref 0–2)
BASOPHILS ABSOLUTE: <0.03 K/UL (ref 0–0.2)
BUN BLDV-MCNC: 6 MG/DL (ref 8–23)
BUN/CREAT BLD: 11 (ref 9–20)
CALCIUM SERPL-MCNC: 8.5 MG/DL (ref 8.6–10.4)
CHLORIDE BLD-SCNC: 104 MMOL/L (ref 98–107)
CO2: 25 MMOL/L (ref 20–31)
CREAT SERPL-MCNC: 0.53 MG/DL (ref 0.5–0.9)
DIFFERENTIAL TYPE: ABNORMAL
EOSINOPHILS RELATIVE PERCENT: 2 % (ref 1–4)
GFR AFRICAN AMERICAN: >60 ML/MIN
GFR NON-AFRICAN AMERICAN: >60 ML/MIN
GFR SERPL CREATININE-BSD FRML MDRD: ABNORMAL ML/MIN/{1.73_M2}
GFR SERPL CREATININE-BSD FRML MDRD: ABNORMAL ML/MIN/{1.73_M2}
GLUCOSE BLD-MCNC: 134 MG/DL (ref 70–99)
HCT VFR BLD CALC: 33.6 % (ref 36.3–47.1)
HEMOGLOBIN: 10.1 G/DL (ref 11.9–15.1)
IMMATURE GRANULOCYTES: 1 %
LYMPHOCYTES # BLD: 23 % (ref 24–43)
MCH RBC QN AUTO: 25.5 PG (ref 25.2–33.5)
MCHC RBC AUTO-ENTMCNC: 30.1 G/DL (ref 28.4–34.8)
MCV RBC AUTO: 84.8 FL (ref 82.6–102.9)
MONOCYTES # BLD: 7 % (ref 3–12)
NRBC AUTOMATED: 0 PER 100 WBC
PDW BLD-RTO: 15.6 % (ref 11.8–14.4)
PLATELET # BLD: 490 K/UL (ref 138–453)
PLATELET ESTIMATE: ABNORMAL
PMV BLD AUTO: 8.7 FL (ref 8.1–13.5)
POTASSIUM SERPL-SCNC: 4.1 MMOL/L (ref 3.7–5.3)
RBC # BLD: 3.96 M/UL (ref 3.95–5.11)
RBC # BLD: ABNORMAL 10*6/UL
SEG NEUTROPHILS: 67 % (ref 36–65)
SEGMENTED NEUTROPHILS ABSOLUTE COUNT: 4.22 K/UL (ref 1.5–8.1)
SODIUM BLD-SCNC: 139 MMOL/L (ref 135–144)
WBC # BLD: 6.3 K/UL (ref 3.5–11.3)
WBC # BLD: ABNORMAL 10*3/UL

## 2021-10-27 PROCEDURE — 80048 BASIC METABOLIC PNL TOTAL CA: CPT

## 2021-10-27 PROCEDURE — 99232 SBSQ HOSP IP/OBS MODERATE 35: CPT | Performed by: NURSE PRACTITIONER

## 2021-10-27 PROCEDURE — 2580000003 HC RX 258: Performed by: NURSE PRACTITIONER

## 2021-10-27 PROCEDURE — 2060000000 HC ICU INTERMEDIATE R&B

## 2021-10-27 PROCEDURE — 36415 COLL VENOUS BLD VENIPUNCTURE: CPT

## 2021-10-27 PROCEDURE — 85025 COMPLETE CBC W/AUTO DIFF WBC: CPT

## 2021-10-27 PROCEDURE — 6360000002 HC RX W HCPCS: Performed by: NURSE PRACTITIONER

## 2021-10-27 PROCEDURE — 2580000003 HC RX 258: Performed by: FAMILY MEDICINE

## 2021-10-27 PROCEDURE — 6370000000 HC RX 637 (ALT 250 FOR IP): Performed by: FAMILY MEDICINE

## 2021-10-27 RX ADMIN — MULTIPLE VITAMINS W/ MINERALS TAB 1 TABLET: TAB at 08:32

## 2021-10-27 RX ADMIN — ROSUVASTATIN CALCIUM 5 MG: 5 TABLET, FILM COATED ORAL at 21:10

## 2021-10-27 RX ADMIN — PIPERACILLIN AND TAZOBACTAM 3375 MG: 3; .375 INJECTION, POWDER, LYOPHILIZED, FOR SOLUTION INTRAVENOUS at 06:36

## 2021-10-27 RX ADMIN — PIPERACILLIN AND TAZOBACTAM 3375 MG: 3; .375 INJECTION, POWDER, LYOPHILIZED, FOR SOLUTION INTRAVENOUS at 23:13

## 2021-10-27 RX ADMIN — SODIUM CHLORIDE: 9 INJECTION, SOLUTION INTRAVENOUS at 16:53

## 2021-10-27 RX ADMIN — MAGNESIUM HYDROXIDE 30 ML: 2400 SUSPENSION ORAL at 15:43

## 2021-10-27 RX ADMIN — PIPERACILLIN AND TAZOBACTAM 3375 MG: 3; .375 INJECTION, POWDER, LYOPHILIZED, FOR SOLUTION INTRAVENOUS at 15:43

## 2021-10-27 ASSESSMENT — PAIN SCALES - GENERAL
PAINLEVEL_OUTOF10: 0

## 2021-10-27 ASSESSMENT — ENCOUNTER SYMPTOMS
GASTROINTESTINAL NEGATIVE: 1
RESPIRATORY NEGATIVE: 1

## 2021-10-27 NOTE — PROGRESS NOTES
[facility].,    Adult Hospitalist      Name: Georgina Leung  MRN: [id number]     Acct: [de-identified]  Room: 2036/2036-01    Admit Date: 10/25/2021  5:25 PM  PCP: Sheri Dowell MD    Primary Problem  Active Problems:    Diverticulitis of intestine with abscess  Resolved Problems:    * No resolved hospital problems. *        Assesment:     · Acute recurrent diverticulitis  · Intra-abdominal phlegmon, unable to drain  · Acute cystitis without hematuria  · Mixed hyperlipidemia  · Intermittent asthma  · Diabetes mellitus type 2  · Acute blood loss anemia  · Hyponatremia  · Hypoalbuminemia  · Acute dehydration-resolved        Plan:     · Admit progressive  · Monitor vitals closely  · Keep SPO2 above 90%  · I's and O's  · IV fluids  · Pain control  · N.p.o.  · Surgery consulted in ED  · Advance diet if okay with surgery  · Zosyn IV  · Antiemetics as needed  · Surgery consult  · ID consult  · CBC, BMP  · DVT and GI prophylaxis. Chief Complaint:     Chief Complaint   Patient presents with    Abdominal Pain     states diagnosis of diverticulitis in Feb.         History of Present Illness:        Pt seen and examined at bedside  Pt feels better  Some abd pain  No more fever  Denies CP, cough, dyspnea  Denies vomiting, joint swelling, rash  Other ROS neg      Initial HPI  Georgina Leung is a 73 y.o.  female who presents with Abdominal Pain (states diagnosis of diverticulitis in Feb.)    Patient admitted through the emergency room where she presented with abdominal pain. Patient says she had been feeling weak for several days. She had fever yesterday and was seen by her PCP Dr. Rosita Dixon in her office. Patient has prior history of recurrent diverticulitis. Dr. Rosita Dixon started her on oral antibiotics though she continued to worsen with her symptoms. Patient says she continued to have nausea and her weakness progressed.     Patient was initially seen to have low blood pressure because of which a decision was made to admit to her ICU. Patient was monitored and given IV fluids. She was able to ambulate without any dizziness. Patient's brother is at bedside and says her blood pressure has remained systolic between  predominantly. She does not have any tachycardia and she does not have any clinical dehydration after initial IV fluids. Her heart rate has been in the 60s and 70s presently    Patient has had a bowel movement in the emergency room which showed hematochezia. She says the amount of blood has reduced over the last few days. Patient remained hemodynamically stable and then was able to tolerate her diet after which she was downgraded to progressive status. Patient denies any chest pain, dyspnea or orthopnea. Denies any headache, vision change or neck pain. Denies any vomiting or back pain. I have personally reviewed the past medical history, past surgical history, medications, social history, and family history, and summarized in the note. Review of Systems:     All 10 point system is reviewed and negative otherwise mentioned in HPI. Past Medical History:     Past Medical History:   Diagnosis Date    Arthritis     knee, back    Asthma     GERD (gastroesophageal reflux disease)         Past Surgical History:     Past Surgical History:   Procedure Laterality Date    COLONOSCOPY      HERNIA REPAIR      umbilical    HERNIA REPAIR  07/19/2016    incisional with mesh, robotic converted to open    TONSILLECTOMY          Medications Prior to Admission:       Prior to Admission medications    Medication Sig Start Date End Date Taking?  Authorizing Provider   ciprofloxacin (CIPRO) 500 MG tablet Take 1 tablet by mouth every 12 hours for 27 doses 10/21/21 11/4/21  Rahpael Mcdermott MD   metroNIDAZOLE (FLAGYL) 500 MG tablet Take 1 tablet by mouth every 8 hours for 14 days 10/21/21 11/4/21  Raphael Mcdermott MD   rosuvastatin (CRESTOR) 5 MG tablet Take 5 mg by mouth nightly    Historical Provider, MD metFORMIN (GLUCOPHAGE-XR) 500 MG extended release tablet Take 500 mg by mouth 2 times daily (with meals)  10/7/21   Historical Provider, MD   omeprazole (PRILOSEC) 40 MG capsule Take 40 mg by mouth daily as needed     Historical Provider, MD   Multiple Vitamins-Minerals (THERAPEUTIC MULTIVITAMIN-MINERALS) tablet Take 1 tablet by mouth daily    Historical Provider, MD   albuterol sulfate  (90 BASE) MCG/ACT inhaler Inhale 2 puffs into the lungs every 6 hours as needed for Wheezing    Historical Provider, MD        Allergies:       Sulfa antibiotics    Social History:     Tobacco:    reports that she has quit smoking. Her smoking use included cigarettes. She has a 0.75 pack-year smoking history. She has never used smokeless tobacco.  Alcohol:      reports current alcohol use. Drug Use:  reports no history of drug use. Family History:     History reviewed. No pertinent family history.       Physical Exam:     Vitals:  /80   Pulse 106   Temp 97.6 °F (36.4 °C) (Temporal)   Resp 18   Ht 5' 8\" (1.727 m)   Wt 195 lb (88.5 kg)   SpO2 92%   BMI 29.65 kg/m²   Temp (24hrs), Av.5 °F (36.4 °C), Min:97.3 °F (36.3 °C), Max:97.7 °F (36.5 °C)          General appearance - alert, well appearing, and in no acute distress  Mental status - oriented to person, place, and time with normal affect  Head - normocephalic and atraumatic  Eyes - pupils equal and reactive, extraocular eye movements intact, conjunctiva clear  Ears - hearing appears to be intact  Nose - no drainage noted  Mouth - mucous membranes moist  Neck - supple, no carotid bruits, thyroid not palpable  Chest - clear to auscultation, normal effort  Heart - normal rate, regular rhythm, no murmur  Abdomen - soft, tender mild, generalized, nondistended, bowel sounds present all four quadrants, no masses, hepatomegaly or splenomegaly  Neurological - normal speech, no focal findings or movement disorder noted, cranial nerves II through XII grossly intact  Extremities - peripheral pulses palpable, no pedal edema or calf pain with palpation  Skin - no gross lesions, rashes, or induration noted        Data:     Labs:    Hematology:  Recent Labs     10/25/21  1823 10/26/21  0554 10/27/21  0529   WBC 13.0* 5.6 6.3   RBC 3.93* 3.45* 3.96   HGB 10.2* 8.9* 10.1*   HCT 32.7* 29.3* 33.6*   MCV 83.2 84.9 84.8   MCH 26.0 25.8 25.5   MCHC 31.2 30.4 30.1   RDW 15.2* 15.4* 15.6*   * 392 490*   MPV 8.8 8.5 8.7   INR  --  1.3  --      Chemistry:  Recent Labs     10/25/21  1823 10/26/21  0554 10/27/21  0529   * 138 139   K 3.9 3.8 4.1    105 104   CO2 23 24 25   GLUCOSE 143* 123* 134*   BUN 8 5* 6*   CREATININE 0.51 0.48* 0.53   ANIONGAP 10 9 10   LABGLOM >60 >60 >60   GFRAA >60 >60 >60   CALCIUM 8.7 8.2* 8.5*     Recent Labs     10/25/21  1823   PROT 6.9   LABALBU 2.9*   AST 12   ALT 9   ALKPHOS 79   BILITOT 0.22*   BILIDIR <0.08   LIPASE 24       Lab Results   Component Value Date    INR 1.3 10/26/2021    PROTIME 16.0 (H) 10/26/2021       Lab Results   Component Value Date/Time    [facility] 10/25/2021 06:28 PM     Lab Results   Component Value Date/Time    CULTURE NO GROWTH 2 DAYS 10/25/2021 06:28 PM       No results found for: POCPH, PHART, PH, POCPCO2, OCY0HIO, PCO2, POCPO2, PO2ART, PO2, POCHCO3, BNC5UYC, HCO3, NBEA, PBEA, BEART, BE, THGBART, THB, ARB2IJD, PYIC7CWN, Z3MUBNJZ, O2SAT, FIO2    Radiology:    CT ABDOMEN PELVIS W IV CONTRAST Additional Contrast? None    Result Date: 10/25/2021  1. Acute complicated diverticulitis involving the distal descending and proximal sigmoid colon, with multiple fistulas between the inflamed areas of bowel. A small intraperitoneal abscess is present within the left lower quadrant, measuring 2.2 x 1.7 cm. 2. Enlargement of the left iliopsoas muscle, within the mid to lower pelvis, and extending to the left groin region.   Phlegmonous changes are present, with a small abscess within the iliopsoas muscle anterior to the hip joint, and within the enlarged left pectineus muscle 3. Inflamed diverticulum involving the distal transverse colon, without evidence of free air or abscess formation in this area. All radiological studies reviewed                Code Status:  Full Code    Electronically signed by Brenda Vallecillo MD on 10/27/2021 at 4:20 PM     Copy sent to Dr. Rosamaria Goldstein MD    This note was created with the assistance of a speech-recognition program.  Although the intention is to generate a document that actually reflects the content of the visit, no guarantees can be provided that every mistake has been identified and corrected by editing. Note was updated later by me after  physical examination and  completion of the assessment.

## 2021-10-27 NOTE — PLAN OF CARE
Problem: Activity:  Goal: Risk for activity intolerance will decrease  Description: Risk for activity intolerance will decrease  Outcome: Ongoing     Problem:  Bowel/Gastric:  Goal: Bowel function will improve  Description: Bowel function will improve  Outcome: Ongoing  Goal: Diagnostic test results will improve  Description: Diagnostic test results will improve  Outcome: Ongoing  Goal: Occurrences of nausea will decrease  Description: Occurrences of nausea will decrease  Outcome: Ongoing  Goal: Occurrences of vomiting will decrease  Description: Occurrences of vomiting will decrease  Outcome: Ongoing     Problem: Fluid Volume:  Goal: Maintenance of adequate hydration will improve  Description: Maintenance of adequate hydration will improve  Outcome: Ongoing  Goal: Ability to maintain a balanced intake and output will improve  Description: Ability to maintain a balanced intake and output will improve  Outcome: Ongoing     Problem: Health Behavior:  Goal: Ability to state signs and symptoms to report to health care provider will improve  Description: Ability to state signs and symptoms to report to health care provider will improve  Outcome: Ongoing  Goal: Ability to identify and utilize available resources and services will improve  Description: Ability to identify and utilize available resources and services will improve  Outcome: Ongoing  Goal: Ability to manage health-related needs will improve  Description: Ability to manage health-related needs will improve  Outcome: Ongoing     Problem: Physical Regulation:  Goal: Diagnostic test results will improve  Description: Diagnostic test results will improve  Outcome: Ongoing  Goal: Complications related to the disease process, condition or treatment will be avoided or minimized  Description: Complications related to the disease process, condition or treatment will be avoided or minimized  Outcome: Ongoing  Goal: Ability to maintain clinical measurements within

## 2021-10-27 NOTE — PROGRESS NOTES
[facility]  Progress Note    Maryellen Bingham  MRN: [id number]  Date: 10/27/2021  LOS: 2     Reason for F/U :   Recurrent diverticulitis with intra-abdominal abscess    Impression :   1. Recurrent diverticulitis with intra-abdominal abscess  2. Urinary tract infection    Recommendations:   · Patient continues on IV antimicrobial therapy with Zosyn  · Blood cultures remain no growth to date  · Urine culture has been sent  · Patient was seen by the surgical team, she will need semielective sigmoid resection in the future  · Tentative discharge plan will be to place a midline and switch patient to invanz     Infection Control Recommendations:   Universal precautions    Discharge Planning:   Estimated Length of IV antimicrobials: TBD  Patient will need Midline Catheter Insertion/ PICC line Insertion: No  Patient will need: Home IV , [place],  SNF,  LTAC: Undetermined  Patient willneed outpatient wound care: No    Medical Decision making / Summary of Stay:   Maryellen Bingham is a 73y.o.-year-old female who was initially admitted on 10/25/2021. Patient has no medical history of asthma, GERD, arthritis and diverticulitis on a few occasions over the past year.     Patient is known to our practice from hospitalization earlier in October. At that time, she was admitted due to CT scan of the abdomen that revealed left lower quadrant posterior abdominal abscess near the left psoas muscle related to a perforated diverticular abscess. She was seen by general surgery at that time and it was not felt that she needed any acute surgical intervention at that time. She was discharged 10/21/2021 on oral antibiotics.     Yesterday patient returned to the emergency department with complaints of fatigue, fevers and chills. She had gone to her family [de-identified] office and she was felt to not look well so they instructed her to come to the emergency department. Urinalysis positive nitrates, small leuk esterase. WBC 13.    CT abd/pelvis       Impression:         1. Acute complicated diverticulitis involving the distal descending and   proximal sigmoid colon, with multiple fistulas between the inflamed areas of   bowel.  A small intraperitoneal abscess is present within the left lower   quadrant, measuring 2.2 x 1.7 cm.   2. Enlargement of the left iliopsoas muscle, within the mid to lower pelvis,   and extending to the left groin region.  Phlegmonous changes are present,   with a small abscess within the iliopsoas muscle anterior to the hip joint,   and within the enlarged left pectineus muscle   3. Inflamed diverticulum involving the distal transverse colon, without   evidence of free air or abscess formation in this area.       Patient has been seen and evaluated by general surgery, she tells me that they do plan to do surgery once the inflammation has improved. Patient does tell me that she has been having some urinary discomfort and a sensation of not fully emptying her bladder. She has been initiated on Zosyn. We were consulted assist with antimicrobial therapy management. Current evaluation:10/27/2021    /80   Pulse 78   Temp 97.7 °F (36.5 °C) (Temporal)   Resp 18   Ht 5' 8\" (1.727 m)   Wt 195 lb (88.5 kg)   SpO2 92%   BMI 29.65 kg/m²     Temperature Range: Temp: 97.7 °F (36.5 °C) Temp  Av.6 °F (36.4 °C)  Min: 97.5 °F (36.4 °C)  Max: 97.7 °F (36.5 °C)    Patient was seen and evaluated sitting up in the chair  States she overall feels better but does still have some abdominal discomfort    Review of Systems   Constitutional: Negative. HENT: Negative. Respiratory: Negative. Cardiovascular: Negative. Gastrointestinal: Negative. Genitourinary: Negative. Musculoskeletal: Negative. Skin: Negative. Neurological: Negative. Psychiatric/Behavioral: Negative. Physical Examination :     Physical Exam  Constitutional:       Appearance: Normal appearance. She is normal weight. HENT:      Head: Normocephalic and atraumatic. Cardiovascular:      Rate and Rhythm: Normal rate and regular rhythm. Pulmonary:      Effort: Pulmonary effort is normal. No respiratory distress. Breath sounds: Normal breath sounds. Abdominal:      General: Abdomen is flat. Bowel sounds are normal.      Palpations: Abdomen is soft. Musculoskeletal:         General: Normal range of motion. Skin:     General: Skin is warm and dry. Neurological:      General: No focal deficit present. Mental Status: She is alert and oriented to person, place, and time. Mental status is at baseline. Psychiatric:         Mood and Affect: Mood normal.         Behavior: Behavior normal.         Thought Content: Thought content normal.         Laboratory data:   I have independently reviewed the followinglabs:  CBC with Differential:   Recent Labs     10/26/21  0554 10/27/21  0529   WBC 5.6 6.3   HGB 8.9* 10.1*   HCT 29.3* 33.6*    490*   LYMPHOPCT 21* 23*   MONOPCT 12 7     BMP:   Recent Labs     10/26/21  0554 10/27/21  0529    139   K 3.8 4.1    104   CO2 24 25   BUN 5* 6*   CREATININE 0.48* 0.53     Hepatic Function Panel:   Recent Labs     10/25/21  1823   PROT 6.9   LABALBU 2.9*   BILIDIR <0.08   IBILI CANNOT BE CALCULATED   BILITOT 0.22*   ALKPHOS 79   ALT 9   AST 12         Lab Results   Component Value Date    PROCAL 0.05 10/19/2021     Lab Results   Component Value Date    CRP 69.5 10/19/2021     Lab Results   Component Value Date    SEDRATE 88 (H) 10/19/2021         No results found for: DDIMER  No results found for: FERRITIN  No results found for: LDH  No results found for: FIBRINOGEN    No results found for requested labs within last 30 days. No results found for: COVID19    No results for input(s): VANCOTROUGH in the last 72 hours. Imaging Studies:   CT abd / pelvis  Impression:        1.  Acute complicated diverticulitis involving the distal descending and   proximal sigmoid colon, with multiple fistulas between the inflamed areas of   bowel.  A small intraperitoneal abscess is present within the left lower   quadrant, measuring 2.2 x 1.7 cm.   2. Enlargement of the left iliopsoas muscle, within the mid to lower pelvis,   and extending to the left groin region.  Phlegmonous changes are present,   with a small abscess within the iliopsoas muscle anterior to the hip joint,   and within the enlarged left pectineus muscle   3. Inflamed diverticulum involving the distal transverse colon, without   evidence of free air or abscess formation in this area. Cultures:     Culture, Blood 1 [[id number]] Collected: 10/25/21 1824   Order Status: Completed Specimen: Blood Updated: 10/27/21 0112    Specimen Description . BLOOD    Special Requests 20CC    Culture NO GROWTH 2 DAYS   Culture, Blood 1 [[id number]] Collected: 10/25/21 1828   Order Status: Completed Specimen: Blood Updated: 10/27/21 0112    Specimen Description . BLOOD    Special Requests 20CC    Culture NO GROWTH 2 DAYS   Culture, Urine [[id number]]    Order Status: Sent Specimen: Urine, clean catch          Medications:      therapeutic multivitamin-minerals  1 tablet Oral Daily    metFORMIN  500 mg Oral BID WC    rosuvastatin  5 mg Oral Nightly    sodium chloride flush  5-40 mL IntraVENous 2 times per day    piperacillin-tazobactam  3,375 mg IntraVENous Q8H    enoxaparin  40 mg SubCUTAneous Daily           [facility]  TAMIKO Reynoso CNP  [employer]  OFFICE: [phone]      Electronically signed by TAMIKO Reynoso CNP on 10/27/2021 at 6:19 AM  Thank you for allowing us to participate in the care of this patient. Please call with questions.     This note iscreated with the assistance of a speech recognition program.  While intending to generate a document that actually reflects the content of the visit, the document can still have some errors including those of syntax andsound a like substitutions which may escape proof reading. In such instances, actual meaning can be extrapolated by contextual diversion.

## 2021-10-27 NOTE — FLOWSHEET NOTE
Patient states about her reoccurring medical condition. States to have surgery soon. States good family support. Expressed no major needs, or prayers. Follow up as needed. 10/27/21 5925   Encounter Summary   Services provided to: Patient   Referral/Consult From: [facility]   Support System Spouse   Continue Visiting   (10-27-21)   Complexity of Encounter Moderate   Length of Encounter 15 minutes   Spiritual Assessment Completed Yes   Routine   Type Initial   Assessment Calm; Approachable   Intervention Explored feelings, thoughts, concerns; Discussed illness/injury and it's impact; Discussed belief system/Sikhism practices/missy   Outcome Expressed gratitude;Receptive

## 2021-10-27 NOTE — PROGRESS NOTES
General Surgery:  Daily Progress Note                  PATIENT NAME: Dai Quintanilla     TODAY'S DATE: 10/27/2021, 6:33 AM  CC:  abd pain    SUBJECTIVE:     Pt seen and examined at bedside. Patient's abdominal pain is improved since yesterday. She continues to have diarrhea. Patient noticed blood in the toilet with bowel movements unsure if it is coming from hemorrhoids. Denies nausea and vomiting. OBJECTIVE:   VITALS:  /80   Pulse 78   Temp 97.7 °F (36.5 °C) (Temporal)   Resp 18   Ht 5' 8\" (1.727 m)   Wt 195 lb (88.5 kg)   SpO2 92%   BMI 29.65 kg/m²      INTAKE/OUTPUT:      Intake/Output Summary (Last 24 hours) at 10/27/2021 4762  Last data filed at 10/26/2021 2049  Gross per 24 hour   Intake 521 ml   Output --   Net 521 ml       PHYSICAL EXAM:  General Appearance: awake, alert, oriented, in no acute distress  HEENT:  Normocephalic, atraumatic, mucus membranes moist   Heart: Heart regular rate and rhythm  Lungs: Normal expansion. Clear to auscultation. No rales, rhonchi, or wheezing. Abdomen: Soft, mild tenderness to palpation to left lower quadrant, improved from yesterday  Extremities: No cyanosis, pitting edema, rashes noted. Skin: Skin color, texture, turgor normal. No rashes or lesions.     IV Access:  PIV      Data:  CBC with Differential:    Lab Results   Component Value Date    WBC 6.3 10/27/2021    RBC 3.96 10/27/2021    HGB 10.1 10/27/2021    HCT 33.6 10/27/2021     10/27/2021    MCV 84.8 10/27/2021    MCH 25.5 10/27/2021    MCHC 30.1 10/27/2021    RDW 15.6 10/27/2021    LYMPHOPCT 23 10/27/2021    MONOPCT 7 10/27/2021    BASOPCT 0 10/27/2021    MONOSABS 0.47 10/27/2021    LYMPHSABS 1.47 10/27/2021    EOSABS 0.11 10/27/2021    BASOSABS <0.03 10/27/2021    DIFFTYPE NOT REPORTED 10/27/2021     BMP:    Lab Results   Component Value Date     10/27/2021    K 4.1 10/27/2021     10/27/2021    CO2 25 10/27/2021    BUN 6 10/27/2021    LABALBU 2.9 10/25/2021    CREATININE 0.53 10/27/2021    CALCIUM 8.5 10/27/2021    GFRAA >60 10/27/2021    LABGLOM >60 10/27/2021    GLUCOSE 134 10/27/2021       Radiology Review:    CT ABDOMEN PELVIS W IV CONTRAST Additional Contrast? None   Final Result   1. Acute complicated diverticulitis involving the distal descending and   proximal sigmoid colon, with multiple fistulas between the inflamed areas of   bowel. A small intraperitoneal abscess is present within the left lower   quadrant, measuring 2.2 x 1.7 cm.   2. Enlargement of the left iliopsoas muscle, within the mid to lower pelvis,   and extending to the left groin region. Phlegmonous changes are present,   with a small abscess within the iliopsoas muscle anterior to the hip joint,   and within the enlarged left pectineus muscle   3. Inflamed diverticulum involving the distal transverse colon, without   evidence of free air or abscess formation in this area. ASSESSMENT:  Active Hospital Problems    Diagnosis Date Noted    Diverticulitis of intestine with abscess [K57.80] 10/25/2021       1. Alec Quigley, 74 yo F with recurrent diverticulitis presents with phlegmonous changes to left lower quadrant iliopsoas muscles and 2.2 x 1.7 cm abscess as well as a urinary tract infection. Plan:  1. Continue medical management supportive care per primary  2. At this time patient does not have any drainable fluid collections. Recommend continue management with IV antibiotics, currently on Zosyn per infectious disease. 3. Keep patient on clear liquid diet. Still having abdominal pain. 4. Serial abdominal examinations  5. We will continue to follow.     Electronically signed by Joan Vásquez MD  on 10/27/2021 at 6:33 AM

## 2021-10-27 NOTE — CARE COORDINATION
Case Management Initial Discharge Plan  Omar Welch,             Met with:patient to discuss discharge plans. Information verified: address, contacts, phone number, , insurance Yes  Insurance Provider: medicare    Emergency Contact/Next of Kin name & number: Raphael Hutchins    [phone]  Who are involved in patient's support system? spouse    PCP: Abiodun Disla MD  Date of last visit: In July      Discharge Planning    Living Arrangements:        Home has 1 stories  2 stairs to climb to get into front door, 0stairs to climb to reach second floor  Location of bedroom/bathroom in home main    Patient able to perform ADL's:Independent    Current Services (outpatient & in home) none  DME equipment: 0  DME provider: 0    Is patient receiving oral anticoagulation therapy? No    If indicated:   Physician managing anticoagulation treatment:   Where does patient obtain lab work for ATC treatment? Potential Assistance Needed:       Patient agreeable to home care: No  [place] of choice provided:  n/a    Prior SNF/Rehab Placement and Facility: n/a  Agreeable to SNF/Rehab: No  [place] of choice provided: n/a     Evaluation: no    Expected Discharge date:       Patient expects to be discharged to: If home: is the family and/or caregiver wiling & able to provide support at home? yes  Who will be providing this support? family    Follow Up Appointment: Best Day/ Time:      Transportation provider: spouse  Transportation arrangements needed for discharge: No    Readmission Risk              Risk of Unplanned Readmission:  13             Does patient have a readmission risk score greater than 14?: No  If yes, follow-up appointment must be made within 7 days of discharge. Goals of Care:       Educated patient on transitional options, provided freedom of choice and are agreeable with plan      Discharge Plan: Diverticulitis  Patient lives with spouse in a 1 story home with 2 steps to enter. Declines any skilled needs. Independent and drives. Does not use any DME. [facility]  Gen surg and ID consulted  Plan is home independently. Watch for IV atx. Continue to follow.              Electronically signed by Joanna Yu RN on 10/27/21 at 10:44 AM EDT

## 2021-10-28 PROBLEM — E44.1 MILD MALNUTRITION (HCC): Status: ACTIVE | Noted: 2021-10-28

## 2021-10-28 LAB
ABSOLUTE EOS #: 0.15 K/UL (ref 0–0.44)
ABSOLUTE IMMATURE GRANULOCYTE: 0.03 K/UL (ref 0–0.3)
ABSOLUTE LYMPH #: 1.28 K/UL (ref 1.1–3.7)
ABSOLUTE MONO #: 0.7 K/UL (ref 0.1–1.2)
ANION GAP SERPL CALCULATED.3IONS-SCNC: 8 MMOL/L (ref 9–17)
BASOPHILS # BLD: 0 % (ref 0–2)
BASOPHILS ABSOLUTE: 0.03 K/UL (ref 0–0.2)
BUN BLDV-MCNC: 6 MG/DL (ref 8–23)
BUN/CREAT BLD: 12 (ref 9–20)
CALCIUM SERPL-MCNC: 8.1 MG/DL (ref 8.6–10.4)
CHLORIDE BLD-SCNC: 104 MMOL/L (ref 98–107)
CO2: 25 MMOL/L (ref 20–31)
CREAT SERPL-MCNC: 0.51 MG/DL (ref 0.5–0.9)
DIFFERENTIAL TYPE: ABNORMAL
EOSINOPHILS RELATIVE PERCENT: 2 % (ref 1–4)
GFR AFRICAN AMERICAN: >60 ML/MIN
GFR NON-AFRICAN AMERICAN: >60 ML/MIN
GFR SERPL CREATININE-BSD FRML MDRD: ABNORMAL ML/MIN/{1.73_M2}
GFR SERPL CREATININE-BSD FRML MDRD: ABNORMAL ML/MIN/{1.73_M2}
GLUCOSE BLD-MCNC: 168 MG/DL (ref 70–99)
HCT VFR BLD CALC: 28.6 % (ref 36.3–47.1)
HEMOGLOBIN: 8.7 G/DL (ref 11.9–15.1)
IMMATURE GRANULOCYTES: 0 %
LYMPHOCYTES # BLD: 16 % (ref 24–43)
MCH RBC QN AUTO: 25.7 PG (ref 25.2–33.5)
MCHC RBC AUTO-ENTMCNC: 30.4 G/DL (ref 28.4–34.8)
MCV RBC AUTO: 84.4 FL (ref 82.6–102.9)
MONOCYTES # BLD: 9 % (ref 3–12)
NRBC AUTOMATED: 0 PER 100 WBC
PDW BLD-RTO: 15.6 % (ref 11.8–14.4)
PLATELET # BLD: 440 K/UL (ref 138–453)
PLATELET ESTIMATE: ABNORMAL
PMV BLD AUTO: 8.7 FL (ref 8.1–13.5)
POTASSIUM SERPL-SCNC: 4 MMOL/L (ref 3.7–5.3)
RBC # BLD: 3.39 M/UL (ref 3.95–5.11)
RBC # BLD: ABNORMAL 10*6/UL
SEG NEUTROPHILS: 73 % (ref 36–65)
SEGMENTED NEUTROPHILS ABSOLUTE COUNT: 6.07 K/UL (ref 1.5–8.1)
SODIUM BLD-SCNC: 137 MMOL/L (ref 135–144)
WBC # BLD: 8.3 K/UL (ref 3.5–11.3)
WBC # BLD: ABNORMAL 10*3/UL

## 2021-10-28 PROCEDURE — 99232 SBSQ HOSP IP/OBS MODERATE 35: CPT | Performed by: INTERNAL MEDICINE

## 2021-10-28 PROCEDURE — 80048 BASIC METABOLIC PNL TOTAL CA: CPT

## 2021-10-28 PROCEDURE — 6370000000 HC RX 637 (ALT 250 FOR IP): Performed by: FAMILY MEDICINE

## 2021-10-28 PROCEDURE — 2060000000 HC ICU INTERMEDIATE R&B

## 2021-10-28 PROCEDURE — 85025 COMPLETE CBC W/AUTO DIFF WBC: CPT

## 2021-10-28 PROCEDURE — 2580000003 HC RX 258: Performed by: NURSE PRACTITIONER

## 2021-10-28 PROCEDURE — 6360000002 HC RX W HCPCS: Performed by: NURSE PRACTITIONER

## 2021-10-28 PROCEDURE — 36415 COLL VENOUS BLD VENIPUNCTURE: CPT

## 2021-10-28 RX ADMIN — PIPERACILLIN AND TAZOBACTAM 3375 MG: 3; .375 INJECTION, POWDER, LYOPHILIZED, FOR SOLUTION INTRAVENOUS at 06:06

## 2021-10-28 RX ADMIN — PIPERACILLIN AND TAZOBACTAM 3375 MG: 3; .375 INJECTION, POWDER, LYOPHILIZED, FOR SOLUTION INTRAVENOUS at 14:30

## 2021-10-28 RX ADMIN — MULTIPLE VITAMINS W/ MINERALS TAB 1 TABLET: TAB at 08:30

## 2021-10-28 RX ADMIN — PIPERACILLIN AND TAZOBACTAM 3375 MG: 3; .375 INJECTION, POWDER, LYOPHILIZED, FOR SOLUTION INTRAVENOUS at 22:30

## 2021-10-28 ASSESSMENT — ENCOUNTER SYMPTOMS
RESPIRATORY NEGATIVE: 1
ABDOMINAL PAIN: 1

## 2021-10-28 ASSESSMENT — PAIN SCALES - GENERAL
PAINLEVEL_OUTOF10: 0

## 2021-10-28 NOTE — PROGRESS NOTES
Comprehensive Nutrition Assessment    Type and Reason for Visit:  Positive Nutrition Screen (Unplanned weight loss, decreased appetite)    Nutrition Recommendations/Plan:   1. Continue Regular diet  2. Continue Ensure Enlive 3x/day  3. Monitor p.o intakes, diarrhea status and labs    Nutrition Assessment:  Patient admission is arelated to diverticulitis of colon and intra-abdominal abscess. Patient reports issues with diverticulitis for many months, decreased appetite and 65 lbs weight loss in 8 months related to lack of appetite. Unsure of stated weight loss is accurate but patient is likely at least mildly malnourished. Patient reports she is tolerating the Regular diet and diarrhea is improving. Patient has Ensure Enlive ordered but she has not tried them yet. Continue current diet and oral nutrition supplements. Malnutrition Assessment:  Malnutrition Status:  Mild malnutrition    Context:  Chronic Illness     Findings of the 6 clinical characteristics of malnutrition:  Energy Intake:  7 - 75% or less estimated energy requirements for 1 month or longer  Weight Loss:  1 - Mild weight loss (specify amount and time period)     Body Fat Loss:  Unable to assess     Muscle Mass Loss:  Unable to assess    Fluid Accumulation:  No significant fluid accumulation Extremities   Strength:  Not Performed    Estimated Daily Nutrient Needs:  Energy (kcal):  5077-6809 kcal (20-22 kcal/kg); Weight Used for Energy Requirements:  Current     Protein (g):  76-82 gm of protein (1.2-1.3 gm/kg); Weight Used for Protein Requirements:  Ideal            Nutrition Related Findings:  Edema: +2 RLE, +1 LLE. Active bowel sounds. Diarrhea is improving      Wounds:  None       Current Nutrition Therapies:    ADULT DIET;  Regular; Low Fiber  ADULT ORAL NUTRITION SUPPLEMENT; Breakfast, Lunch, Dinner; Standard High Calorie/High Protein Oral Supplement    Anthropometric Measures:  · Height: 5' 8\" (172.7 cm)  · Current Body Weight: 195 lb (88.5 kg)   · Admission Body Weight: 195 lb (88.5 kg)    · Ideal Body Weight: 140 lbs; % Ideal Body Weight 139.3 %   · BMI: 29.7  · BMI Categories: Overweight (BMI 25.0-29. 9)       Nutrition Diagnosis:   · Mild malnutrition related to inadequate protein-energy intake as evidenced by weight loss, intake 0-25%, poor intake prior to admission    · Altered GI function related to altered GI function (diverticulitis) as evidenced by GI abnormality, diarrhea, intake 0-25%      Nutrition Interventions:   Food and/or Nutrient Delivery:  Continue Current Diet, Continue Oral Nutrition Supplement  Nutrition Education/Counseling:  Education not indicated   Coordination of Nutrition Care:  Continue to monitor while inpatient    Goals:  PO intakes are greater than 75% at meals       Nutrition Monitoring and Evaluation:   Food/Nutrient Intake Outcomes:  Supplement Intake, Food and Nutrient Intake  Physical Signs/Symptoms Outcomes:  Biochemical Data, Fluid Status or Edema, Skin, Weight, Diarrhea, GI Status     Discharge Planning:    Continue current diet, Continue Oral Nutrition Supplement             Gloria YOUNG, RDN, LDN  Lead Clinical Dietitian  RD Office Phone [phone]

## 2021-10-28 NOTE — FLOWSHEET NOTE
Dr. Kelsey Garcia arrived to the patient's bedside for evaluation and was updated on the patient's current status via RN. New order received for patient to have midline placed tomorrow for outpatient iv antibiotic needs.

## 2021-10-28 NOTE — PROGRESS NOTES
General Surgery:  Daily Progress Note                  PATIENT NAME: Wolfgang Pereira     TODAY'S DATE: 10/28/2021, 6:33 AM  CC:  abd pain    SUBJECTIVE:     Patient seen and examined at bedside. Patient continues to have left lower quadrant pain and diarrhea. Denies nausea and vomiting    Pt seen and examined at bedside. Patient's abdominal pain is improved since yesterday. She continues to have diarrhea. Patient noticed blood in the toilet with bowel movements unsure if it is coming from hemorrhoids. Denies nausea and vomiting. OBJECTIVE:   VITALS:  /69   Pulse 85   Temp 97.6 °F (36.4 °C) (Oral)   Resp 16   Ht 5' 8\" (1.727 m)   Wt 195 lb (88.5 kg)   SpO2 95%   BMI 29.65 kg/m²      INTAKE/OUTPUT:      Intake/Output Summary (Last 24 hours) at 10/28/2021 9505  Last data filed at 10/28/2021 0600  Gross per 24 hour   Intake 1225 ml   Output --   Net 1225 ml       PHYSICAL EXAM:  General Appearance: awake, alert, oriented, in no acute distress  HEENT:  Normocephalic, atraumatic, mucus membranes moist   Heart: Heart regular rate and rhythm  Lungs: Metrical rise and fall of chest, normal respiratory effort  Abdomen: Soft, nondistended, tenderness to palpation in left lower quadrant   Extremities: No cyanosis, pitting edema, rashes noted. Skin: Skin color, texture, turgor normal. No rashes or lesions.     IV Access:  PIV      Data:  CBC with Differential:    Lab Results   Component Value Date    WBC 8.3 10/28/2021    RBC 3.39 10/28/2021    HGB 8.7 10/28/2021    HCT 28.6 10/28/2021     10/28/2021    MCV 84.4 10/28/2021    MCH 25.7 10/28/2021    MCHC 30.4 10/28/2021    RDW 15.6 10/28/2021    LYMPHOPCT 16 10/28/2021    MONOPCT 9 10/28/2021    BASOPCT 0 10/28/2021    MONOSABS 0.70 10/28/2021    LYMPHSABS 1.28 10/28/2021    EOSABS 0.15 10/28/2021    BASOSABS 0.03 10/28/2021    DIFFTYPE NOT REPORTED 10/28/2021     BMP:    Lab Results   Component Value Date     10/28/2021    K 4.0 10/28/2021    CL 104 10/28/2021    CO2 25 10/28/2021    BUN 6 10/28/2021    LABALBU 2.9 10/25/2021    CREATININE 0.51 10/28/2021    CALCIUM 8.1 10/28/2021    GFRAA >60 10/28/2021    LABGLOM >60 10/28/2021    GLUCOSE 168 10/28/2021       Radiology Review:    CT ABDOMEN PELVIS W IV CONTRAST Additional Contrast? None   Final Result   1. Acute complicated diverticulitis involving the distal descending and   proximal sigmoid colon, with multiple fistulas between the inflamed areas of   bowel. A small intraperitoneal abscess is present within the left lower   quadrant, measuring 2.2 x 1.7 cm.   2. Enlargement of the left iliopsoas muscle, within the mid to lower pelvis,   and extending to the left groin region. Phlegmonous changes are present,   with a small abscess within the iliopsoas muscle anterior to the hip joint,   and within the enlarged left pectineus muscle   3. Inflamed diverticulum involving the distal transverse colon, without   evidence of free air or abscess formation in this area. ASSESSMENT:  Active Hospital Problems    Diagnosis Date Noted    Diverticulitis of intestine with abscess [K57.80] 10/25/2021       1. Eris Shabazz, 72 yo F with recurrent diverticulitis presents with phlegmonous changes to left lower quadrant iliopsoas muscles and 2.2 x 1.7 cm abscess as well as a urinary tract infection. Plan:  1. Continue medical management supportive care per primary  2. At this time patient does not have any drainable fluid collections. Recommend continue management with IV antibiotics, currently on Zosyn per infectious disease. 3. Okay for low fiber diet   4. Serial abdominal examinations  5. We will continue to follow.     Electronically signed by Tati James MD  on 10/28/2021 at 6:33 AM

## 2021-10-28 NOTE — PLAN OF CARE
Nutrition Problem #1: Mild malnutrition  Intervention: Food and/or Nutrient Delivery: Continue Current Diet, Continue Oral Nutrition Supplement  Nutritional Goals: PO intakes are greater than 75% at meals

## 2021-10-28 NOTE — PROGRESS NOTES
to not look well so they instructed her to come to the emergency department. Urinalysis positive nitrates, small leuk esterase. WBC 13.      CT abd/pelvis       Impression:         1. Acute complicated diverticulitis involving the distal descending and   proximal sigmoid colon, with multiple fistulas between the inflamed areas of   bowel.  A small intraperitoneal abscess is present within the left lower   quadrant, measuring 2.2 x 1.7 cm.   2. Enlargement of the left iliopsoas muscle, within the mid to lower pelvis,   and extending to the left groin region.  Phlegmonous changes are present,   with a small abscess within the iliopsoas muscle anterior to the hip joint,   and within the enlarged left pectineus muscle   3. Inflamed diverticulum involving the distal transverse colon, without   evidence of free air or abscess formation in this area.       Patient has been seen and evaluated by general surgery, she tells me that they do plan to do surgery once the inflammation has improved. Patient does tell me that she has been having some urinary discomfort and a sensation of not fully emptying her bladder. She has been initiated on Zosyn. We were consulted assist with antimicrobial therapy management. Current evaluation:10/28/2021    /70   Pulse 83   Temp 97 °F (36.1 °C) (Temporal)   Resp 16   Ht 5' 8\" (1.727 m)   Wt 195 lb (88.5 kg)   SpO2 97%   BMI 29.65 kg/m²     Temperature Range: Temp: 97 °F (36.1 °C) Temp  Av.6 °F (36.4 °C)  Min: 96.8 °F (36 °C)  Max: 98.9 °F (37.2 °C)  The patient is seen and evaluated at bedside and is awake and alert in no acute distress. No subjective fevers or chills. No nausea vomiting or diarrhea. She still does have some abdominal discomfort. Review of Systems   Constitutional: Negative. Respiratory: Negative. Cardiovascular: Negative. Gastrointestinal: Positive for abdominal pain. Genitourinary: Negative. Musculoskeletal: Negative.     Skin: Negative. Neurological: Negative. Psychiatric/Behavioral: Negative. Physical Examination :     Physical Exam  HENT:      Head: Normocephalic and atraumatic. Eyes:      General: No scleral icterus. Pupils: Pupils are equal, round, and reactive to light. Cardiovascular:      Rate and Rhythm: Normal rate. Heart sounds: Normal heart sounds. No murmur heard. Pulmonary:      Effort: Pulmonary effort is normal.      Breath sounds: Normal breath sounds. No wheezing or rales. Abdominal:      General: Bowel sounds are normal.      Palpations: Abdomen is soft. There is no mass. Tenderness: There is no abdominal tenderness. Musculoskeletal:         General: No deformity. Normal range of motion. Cervical back: Normal range of motion and neck supple. Lymphadenopathy:      Cervical: No cervical adenopathy. Skin:     General: Skin is warm and dry. Findings: No rash. Neurological:      Mental Status: She is alert and oriented to person, place, and time.          Laboratory data:   I have independently reviewed the followinglabs:  CBC with Differential:   Recent Labs     10/27/21  0529 10/28/21  0457   WBC 6.3 8.3   HGB 10.1* 8.7*   HCT 33.6* 28.6*   * 440   LYMPHOPCT 23* 16*   MONOPCT 7 9     BMP:   Recent Labs     10/27/21  0529 10/28/21  0457    137   K 4.1 4.0    104   CO2 25 25   BUN 6* 6*   CREATININE 0.53 0.51     Hepatic Function Panel:   Recent Labs     10/25/21  1823   PROT 6.9   LABALBU 2.9*   BILIDIR <0.08   IBILI CANNOT BE CALCULATED   BILITOT 0.22*   ALKPHOS 79   ALT 9   AST 12         Lab Results   Component Value Date    PROCAL 0.05 10/19/2021     Lab Results   Component Value Date    CRP 69.5 10/19/2021     Lab Results   Component Value Date    SEDRATE 88 (H) 10/19/2021         No results found for: DDIMER  No results found for: FERRITIN  No results found for: LDH  No results found for: FIBRINOGEN    No results found for requested labs within last 30 days. No results found for: COVID19    No results for input(s): VANCOTROUGH in the last 72 hours. Imaging Studies:   CT OF THE ABDOMEN AND PELVIS WITH CONTRAST 10/25/2021 1:34 pm  Impression   1. Acute complicated diverticulitis involving the distal descending and   proximal sigmoid colon, with multiple fistulas between the inflamed areas of   bowel.  A small intraperitoneal abscess is present within the left lower   quadrant, measuring 2.2 x 1.7 cm.   2. Enlargement of the left iliopsoas muscle, within the mid to lower pelvis,   and extending to the left groin region.  Phlegmonous changes are present,   with a small abscess within the iliopsoas muscle anterior to the hip joint,   and within the enlarged left pectineus muscle   3. Inflamed diverticulum involving the distal transverse colon, without   evidence of free air or abscess formation in this area. Cultures:     Culture, Blood 1 [[id number]] Collected: 10/25/21 1828   Order Status: Completed Specimen: Blood Updated: 10/28/21 0015    Specimen Description . BLOOD    Special Requests 20CC    Culture NO GROWTH 3 DAYS   Culture, Blood 1 [[id number]] Collected: 10/25/21 1824   Order Status: Completed Specimen: Blood Updated: 10/28/21 0015    Specimen Description . BLOOD    Special Requests 20CC    Culture NO GROWTH 3 DAYS         Medications:      therapeutic multivitamin-minerals  1 tablet Oral Daily    metFORMIN  500 mg Oral BID WC    rosuvastatin  5 mg Oral Nightly    sodium chloride flush  5-40 mL IntraVENous 2 times per day    piperacillin-tazobactam  3,375 mg IntraVENous Q8H    enoxaparin  40 mg SubCUTAneous Daily           [facility]  Vero Holt MD  [employer] messaging  OFFICE: [phone]      Electronically signed by Vero Holt MD on 10/28/2021 at 2:22 PM  Thank you for allowing us to participate in the care of this patient. Please call with questions.     This note iscreated with the assistance of a speech recognition program.  While intending to generate a document that actually reflects the content of the visit, the document can still have some errors including those of syntax andsound a like substitutions which may escape proof reading. In such instances, actual meaning can be extrapolated by contextual diversion.

## 2021-10-28 NOTE — PROGRESS NOTES
[facility].,    Adult Hospitalist      Name: Everett Billy  MRN: [id number]     Acct: [de-identified]  Room: 2036/2036-01    Admit Date: 10/25/2021  5:25 PM  PCP: Doc Carter MD    Primary Problem  Active Problems:    Diverticulitis of intestine with abscess  Resolved Problems:    * No resolved hospital problems. *        Assesment:     · Acute recurrent diverticulitis  · Intra-abdominal phlegmon, unable to drain  · Acute cystitis without hematuria  · Mixed hyperlipidemia  · Intermittent asthma  · Diabetes mellitus type 2  · Acute blood loss anemia  · Hyponatremia  · Hypoalbuminemia  · Acute dehydration-resolved        Plan:     · Admit progressive  · Monitor vitals closely  · Keep SPO2 above 90%  · I's and O's  · IV fluids  · Pain control  · N.p.o.  · Surgery consulted in ED  · Advance diet if okay with surgery  · Zosyn IV  · Antiemetics as needed  · Surgery consult  · ID consult  · CBC, BMP  · DC planning   · Midline in am   · DVT and GI prophylaxis. Chief Complaint:     Chief Complaint   Patient presents with    Abdominal Pain     states diagnosis of diverticulitis in Feb.         History of Present Illness:        Pt seen and examined at bedside  Pt feels better  abd pain better too  Ambulating  Feeding some   No more fever  Denies CP, cough, dyspnea  Denies vomiting, joint swelling, rash  Other ROS neg      Initial HPI  Everett Billy is a 73 y.o.  female who presents with Abdominal Pain (states diagnosis of diverticulitis in Feb.)    Patient admitted through the emergency room where she presented with abdominal pain. Patient says she had been feeling weak for several days. She had fever yesterday and was seen by her PCP Dr. Romeo Chavez in her office. Patient has prior history of recurrent diverticulitis. Dr. Romeo Chavez started her on oral antibiotics though she continued to worsen with her symptoms. Patient says she continued to have nausea and her weakness progressed.     Patient was initially seen to have low blood pressure because of which a decision was made to admit to her ICU. Patient was monitored and given IV fluids. She was able to ambulate without any dizziness. Patient's brother is at bedside and says her blood pressure has remained systolic between  predominantly. She does not have any tachycardia and she does not have any clinical dehydration after initial IV fluids. Her heart rate has been in the 60s and 70s presently    Patient has had a bowel movement in the emergency room which showed hematochezia. She says the amount of blood has reduced over the last few days. Patient remained hemodynamically stable and then was able to tolerate her diet after which she was downgraded to progressive status. Patient denies any chest pain, dyspnea or orthopnea. Denies any headache, vision change or neck pain. Denies any vomiting or back pain. I have personally reviewed the past medical history, past surgical history, medications, social history, and family history, and summarized in the note. Review of Systems:     All 10 point system is reviewed and negative otherwise mentioned in HPI. Past Medical History:     Past Medical History:   Diagnosis Date    Arthritis     knee, back    Asthma     GERD (gastroesophageal reflux disease)         Past Surgical History:     Past Surgical History:   Procedure Laterality Date    COLONOSCOPY      HERNIA REPAIR      umbilical    HERNIA REPAIR  07/19/2016    incisional with mesh, robotic converted to open    TONSILLECTOMY          Medications Prior to Admission:       Prior to Admission medications    Medication Sig Start Date End Date Taking?  Authorizing Provider   ciprofloxacin (CIPRO) 500 MG tablet Take 1 tablet by mouth every 12 hours for 27 doses 10/21/21 11/4/21  Chico Maria MD   metroNIDAZOLE (FLAGYL) 500 MG tablet Take 1 tablet by mouth every 8 hours for 14 days 10/21/21 11/4/21  Chico Maria MD   rosuvastatin (CRESTOR) 5 MG tablet Take 5 mg by mouth nightly    Historical Provider, MD   metFORMIN (GLUCOPHAGE-XR) 500 MG extended release tablet Take 500 mg by mouth 2 times daily (with meals)  10/7/21   Historical Provider, MD   omeprazole (PRILOSEC) 40 MG capsule Take 40 mg by mouth daily as needed     Historical Provider, MD   Multiple Vitamins-Minerals (THERAPEUTIC MULTIVITAMIN-MINERALS) tablet Take 1 tablet by mouth daily    Historical Provider, MD   albuterol sulfate  (90 BASE) MCG/ACT inhaler Inhale 2 puffs into the lungs every 6 hours as needed for Wheezing    Historical Provider, MD        Allergies:       Sulfa antibiotics    Social History:     Tobacco:    reports that she has quit smoking. Her smoking use included cigarettes. She has a 0.75 pack-year smoking history. She has never used smokeless tobacco.  Alcohol:      reports current alcohol use. Drug Use:  reports no history of drug use. Family History:     History reviewed. No pertinent family history.       Physical Exam:     Vitals:  /69   Pulse 74   Temp 96.8 °F (36 °C) (Temporal)   Resp 16   Ht 5' 8\" (1.727 m)   Wt 195 lb (88.5 kg)   SpO2 95%   BMI 29.65 kg/m²   Temp (24hrs), Av.8 °F (36.6 °C), Min:96.8 °F (36 °C), Max:98.9 °F (37.2 °C)          General appearance - alert, well appearing, and in no acute distress  Mental status - oriented to person, place, and time with normal affect  Head - normocephalic and atraumatic  Eyes - pupils equal and reactive, extraocular eye movements intact, conjunctiva clear  Ears - hearing appears to be intact  Nose - no drainage noted  Mouth - mucous membranes moist  Neck - supple, no carotid bruits, thyroid not palpable  Chest - clear to auscultation, normal effort  Heart - normal rate, regular rhythm, no murmur  Abdomen - soft, non tender, no rebound, nondistended, bowel sounds present all four quadrants, no masses, hepatomegaly or splenomegaly  Neurological - normal speech, no focal findings or movement disorder noted, cranial nerves II through XII grossly intact  Extremities - peripheral pulses palpable, no pedal edema or calf pain with palpation  Skin - no gross lesions, rashes, or induration noted        Data:     Labs:    Hematology:  Recent Labs     10/26/21  0554 10/27/21  0529 10/28/21  0457   WBC 5.6 6.3 8.3   RBC 3.45* 3.96 3.39*   HGB 8.9* 10.1* 8.7*   HCT 29.3* 33.6* 28.6*   MCV 84.9 84.8 84.4   MCH 25.8 25.5 25.7   MCHC 30.4 30.1 30.4   RDW 15.4* 15.6* 15.6*    490* 440   MPV 8.5 8.7 8.7   INR 1.3  --   --      Chemistry:  Recent Labs     10/26/21  0554 10/27/21  0529 10/28/21  0457    139 137   K 3.8 4.1 4.0    104 104   CO2 24 25 25   GLUCOSE 123* 134* 168*   BUN 5* 6* 6*   CREATININE 0.48* 0.53 0.51   ANIONGAP 9 10 8*   LABGLOM >60 >60 >60   GFRAA >60 >60 >60   CALCIUM 8.2* 8.5* 8.1*     Recent Labs     10/25/21  1823   PROT 6.9   LABALBU 2.9*   AST 12   ALT 9   ALKPHOS 79   BILITOT 0.22*   BILIDIR <0.08   LIPASE 24       Lab Results   Component Value Date    INR 1.3 10/26/2021    PROTIME 16.0 (H) 10/26/2021       Lab Results   Component Value Date/Time    [facility] 10/25/2021 06:28 PM     Lab Results   Component Value Date/Time    CULTURE NO GROWTH 3 DAYS 10/25/2021 06:28 PM       No results found for: POCPH, PHART, PH, POCPCO2, YKN4HZR, PCO2, POCPO2, PO2ART, PO2, POCHCO3, EKQ0XVB, HCO3, NBEA, PBEA, BEART, BE, THGBART, THB, DGK5ZDS, RBXN5OOX, C8NLUSNM, O2SAT, FIO2    Radiology:    CT ABDOMEN PELVIS W IV CONTRAST Additional Contrast? None    Result Date: 10/25/2021  1. Acute complicated diverticulitis involving the distal descending and proximal sigmoid colon, with multiple fistulas between the inflamed areas of bowel. A small intraperitoneal abscess is present within the left lower quadrant, measuring 2.2 x 1.7 cm. 2. Enlargement of the left iliopsoas muscle, within the mid to lower pelvis, and extending to the left groin region.   Phlegmonous changes are present, with a small abscess within the iliopsoas muscle anterior to the hip joint, and within the enlarged left pectineus muscle 3. Inflamed diverticulum involving the distal transverse colon, without evidence of free air or abscess formation in this area. All radiological studies reviewed                Code Status:  Full Code    Electronically signed by Silver Rothman MD on 10/28/2021 at 10:52 AM     Copy sent to Dr. Lakshmi Andrade MD    This note was created with the assistance of a speech-recognition program.  Although the intention is to generate a document that actually reflects the content of the visit, no guarantees can be provided that every mistake has been identified and corrected by editing. Note was updated later by me after  physical examination and  completion of the assessment.

## 2021-10-28 NOTE — FLOWSHEET NOTE
Dr. Locke arrived to the patients bedside for evaluation and was updated via RN on the patient's current status and plan for midline placement tomorrow for at home I.V. antibiotic therapy.

## 2021-10-28 NOTE — CARE COORDINATION
Plan is midline placement and IV antibiotics. Invanz.    Will get [facility]. agency to follow and call [facility].

## 2021-10-29 ENCOUNTER — APPOINTMENT (OUTPATIENT)
Dept: INTERVENTIONAL RADIOLOGY/VASCULAR | Age: 66
DRG: 391 | End: 2021-10-29
Payer: COMMERCIAL

## 2021-10-29 VITALS
TEMPERATURE: 97.6 F | SYSTOLIC BLOOD PRESSURE: 116 MMHG | OXYGEN SATURATION: 92 % | WEIGHT: 195 LBS | HEIGHT: 68 IN | DIASTOLIC BLOOD PRESSURE: 83 MMHG | RESPIRATION RATE: 18 BRPM | HEART RATE: 75 BPM | BODY MASS INDEX: 29.55 KG/M2

## 2021-10-29 LAB
ABSOLUTE EOS #: 0.19 K/UL (ref 0–0.44)
ABSOLUTE IMMATURE GRANULOCYTE: 0.01 K/UL (ref 0–0.3)
ABSOLUTE LYMPH #: 1.24 K/UL (ref 1.1–3.7)
ABSOLUTE MONO #: 0.49 K/UL (ref 0.1–1.2)
ANION GAP SERPL CALCULATED.3IONS-SCNC: 9 MMOL/L (ref 9–17)
BASOPHILS # BLD: 0 % (ref 0–2)
BASOPHILS ABSOLUTE: <0.03 K/UL (ref 0–0.2)
BUN BLDV-MCNC: 5 MG/DL (ref 8–23)
BUN/CREAT BLD: 10 (ref 9–20)
CALCIUM SERPL-MCNC: 8.4 MG/DL (ref 8.6–10.4)
CHLORIDE BLD-SCNC: 107 MMOL/L (ref 98–107)
CO2: 24 MMOL/L (ref 20–31)
CREAT SERPL-MCNC: 0.51 MG/DL (ref 0.5–0.9)
DIFFERENTIAL TYPE: ABNORMAL
EOSINOPHILS RELATIVE PERCENT: 4 % (ref 1–4)
GFR AFRICAN AMERICAN: >60 ML/MIN
GFR NON-AFRICAN AMERICAN: >60 ML/MIN
GFR SERPL CREATININE-BSD FRML MDRD: ABNORMAL ML/MIN/{1.73_M2}
GFR SERPL CREATININE-BSD FRML MDRD: ABNORMAL ML/MIN/{1.73_M2}
GLUCOSE BLD-MCNC: 150 MG/DL (ref 70–99)
HCT VFR BLD CALC: 28.4 % (ref 36.3–47.1)
HEMOGLOBIN: 8.8 G/DL (ref 11.9–15.1)
IMMATURE GRANULOCYTES: 0 %
LYMPHOCYTES # BLD: 24 % (ref 24–43)
MCH RBC QN AUTO: 26.2 PG (ref 25.2–33.5)
MCHC RBC AUTO-ENTMCNC: 31 G/DL (ref 28.4–34.8)
MCV RBC AUTO: 84.5 FL (ref 82.6–102.9)
MONOCYTES # BLD: 10 % (ref 3–12)
NRBC AUTOMATED: 0 PER 100 WBC
PDW BLD-RTO: 15.7 % (ref 11.8–14.4)
PLATELET # BLD: 403 K/UL (ref 138–453)
PLATELET ESTIMATE: ABNORMAL
PMV BLD AUTO: 7.9 FL (ref 8.1–13.5)
POTASSIUM SERPL-SCNC: 4.3 MMOL/L (ref 3.7–5.3)
RBC # BLD: 3.36 M/UL (ref 3.95–5.11)
RBC # BLD: ABNORMAL 10*6/UL
SEG NEUTROPHILS: 62 % (ref 36–65)
SEGMENTED NEUTROPHILS ABSOLUTE COUNT: 3.21 K/UL (ref 1.5–8.1)
SODIUM BLD-SCNC: 140 MMOL/L (ref 135–144)
WBC # BLD: 5.2 K/UL (ref 3.5–11.3)
WBC # BLD: ABNORMAL 10*3/UL

## 2021-10-29 PROCEDURE — 36415 COLL VENOUS BLD VENIPUNCTURE: CPT

## 2021-10-29 PROCEDURE — 2580000003 HC RX 258: Performed by: NURSE PRACTITIONER

## 2021-10-29 PROCEDURE — 36410 VNPNXR 3YR/> PHY/QHP DX/THER: CPT

## 2021-10-29 PROCEDURE — 85025 COMPLETE CBC W/AUTO DIFF WBC: CPT

## 2021-10-29 PROCEDURE — 76937 US GUIDE VASCULAR ACCESS: CPT

## 2021-10-29 PROCEDURE — 02HV33Z INSERTION OF INFUSION DEVICE INTO SUPERIOR VENA CAVA, PERCUTANEOUS APPROACH: ICD-10-PCS | Performed by: FAMILY MEDICINE

## 2021-10-29 PROCEDURE — 2709999900 IR ULTRASOUND GUIDANCE VASCULAR ACCESS

## 2021-10-29 PROCEDURE — 2580000003 HC RX 258: Performed by: FAMILY MEDICINE

## 2021-10-29 PROCEDURE — 6370000000 HC RX 637 (ALT 250 FOR IP): Performed by: FAMILY MEDICINE

## 2021-10-29 PROCEDURE — 80048 BASIC METABOLIC PNL TOTAL CA: CPT

## 2021-10-29 PROCEDURE — 6360000002 HC RX W HCPCS: Performed by: NURSE PRACTITIONER

## 2021-10-29 RX ORDER — HYDROCODONE BITARTRATE AND ACETAMINOPHEN 5; 325 MG/1; MG/1
1 TABLET ORAL EVERY 8 HOURS PRN
Qty: 9 TABLET | Refills: 0 | Status: SHIPPED | OUTPATIENT
Start: 2021-10-29 | End: 2021-11-01

## 2021-10-29 RX ORDER — ERTAPENEM 1 G/1
1000 INJECTION, POWDER, LYOPHILIZED, FOR SOLUTION INTRAMUSCULAR; INTRAVENOUS EVERY 24 HOURS
Qty: 14 EACH | Refills: 0 | Status: SHIPPED | OUTPATIENT
Start: 2021-10-29 | End: 2021-11-12

## 2021-10-29 RX ADMIN — MULTIPLE VITAMINS W/ MINERALS TAB 1 TABLET: TAB at 09:32

## 2021-10-29 RX ADMIN — PIPERACILLIN AND TAZOBACTAM 3375 MG: 3; .375 INJECTION, POWDER, LYOPHILIZED, FOR SOLUTION INTRAVENOUS at 06:53

## 2021-10-29 RX ADMIN — SODIUM CHLORIDE: 9 INJECTION, SOLUTION INTRAVENOUS at 05:38

## 2021-10-29 ASSESSMENT — PAIN SCALES - GENERAL: PAINLEVEL_OUTOF10: 0

## 2021-10-29 NOTE — PROGRESS NOTES
General Surgery:  Daily Progress Note                  PATIENT NAME: Romero Zamora     TODAY'S DATE: 10/29/2021, 6:46 AM  CC:  abd pain    SUBJECTIVE:     Patient seen and evaluated at bedside. No acute events overnight. Patient still having diarrhea. Abdominal pain significantly improved. Denies nausea and vomiting. OBJECTIVE:   VITALS:  /73   Pulse 75   Temp 97.8 °F (36.6 °C) (Oral)   Resp 15   Ht 5' 8\" (1.727 m)   Wt 195 lb (88.5 kg)   SpO2 92%   BMI 29.65 kg/m²      INTAKE/OUTPUT:      Intake/Output Summary (Last 24 hours) at 10/29/2021 0646  Last data filed at 10/28/2021 1802  Gross per 24 hour   Intake 1040 ml   Output --   Net 1040 ml       PHYSICAL EXAM:  General Appearance: awake, alert, oriented, in no acute distress  HEENT:  Normocephalic, atraumatic, mucus membranes moist   Heart: Heart regular rate and rhythm  Lungs: symmetrical rise and fall of chest, normal effort. Abdomen: Soft, nondistended, minimal tenderness to LLQ, no rebound tenderness  Extremities: No cyanosis, pitting edema, rashes noted. Skin: Skin color, texture, turgor normal. No rashes or lesions.     IV Access:  PIV      Data:  CBC with Differential:    Lab Results   Component Value Date    WBC 5.2 10/29/2021    RBC 3.36 10/29/2021    HGB 8.8 10/29/2021    HCT 28.4 10/29/2021     10/29/2021    MCV 84.5 10/29/2021    MCH 26.2 10/29/2021    MCHC 31.0 10/29/2021    RDW 15.7 10/29/2021    LYMPHOPCT 24 10/29/2021    MONOPCT 10 10/29/2021    BASOPCT 0 10/29/2021    MONOSABS 0.49 10/29/2021    LYMPHSABS 1.24 10/29/2021    EOSABS 0.19 10/29/2021    BASOSABS <0.03 10/29/2021    DIFFTYPE NOT REPORTED 10/29/2021     BMP:    Lab Results   Component Value Date     10/29/2021    K 4.3 10/29/2021     10/29/2021    CO2 24 10/29/2021    BUN 5 10/29/2021    LABALBU 2.9 10/25/2021    CREATININE 0.51 10/29/2021    CALCIUM 8.4 10/29/2021    GFRAA >60 10/29/2021    LABGLOM >60 10/29/2021    GLUCOSE 150 10/29/2021 Radiology Review:    CT ABDOMEN PELVIS W IV CONTRAST Additional Contrast? None   Final Result   1. Acute complicated diverticulitis involving the distal descending and   proximal sigmoid colon, with multiple fistulas between the inflamed areas of   bowel. A small intraperitoneal abscess is present within the left lower   quadrant, measuring 2.2 x 1.7 cm.   2. Enlargement of the left iliopsoas muscle, within the mid to lower pelvis,   and extending to the left groin region. Phlegmonous changes are present,   with a small abscess within the iliopsoas muscle anterior to the hip joint,   and within the enlarged left pectineus muscle   3. Inflamed diverticulum involving the distal transverse colon, without   evidence of free air or abscess formation in this area. IR ULTRASOUND GUIDANCE VASCULAR ACCESS    (Results Pending)         ASSESSMENT:  Active Hospital Problems    Diagnosis Date Noted    Mild malnutrition ([facility] [address] [E44.1] 10/28/2021    Diverticulitis of intestine with abscess [K57.80] 10/25/2021       1. [place] Ramus, 74 yo F with recurrent diverticulitis presents with phlegmonous changes to left lower quadrant iliopsoas muscles and 2.2 x 1.7 cm abscess as well as a urinary tract infection. Plan:  1. Continue medical management supportive care per primary  2. At this time patient does not have any drainable fluid collections. Recommend continue management with IV antibiotics, currently on Zosyn per infectious disease. Plan for midline placement today and then outpatient antibiotic therapy with Invanz 1g IV daily for 14 days. 3. Okay for low fiber diet   4. Serial abdominal examinations  5. Following midline placement, patient is appropriate for discharge from a surgical standpoint. Will have close follow up with patient to monitor abdomen. Hopefully, acute inflammation will resolve and one step procedure can be scheduled in the next 6 weeks. Follow up with Dr. Sivakumar Espinoza in office in 1 week. Electronically signed by Dangelo Webster MD  on 10/29/2021 at 6:46 AM   Attending Physician Statement  I have discussed the case, including pertinent history and exam findings with the resident. I agree with the assessment, plan and orders as documented by the resident. Denies any abdominal pain. Plans by ID noted.   Will f/u as outpt and arrange for semielective sigmoid resection

## 2021-10-29 NOTE — PLAN OF CARE
Problem: Activity:  Goal: Risk for activity intolerance will decrease  Description: Risk for activity intolerance will decrease  Outcome: Ongoing     Problem: Bowel/Gastric:  Goal: Bowel function will improve  Description: Bowel function will improve  Outcome: Ongoing     Problem: Bowel/Gastric:  Goal: Diagnostic test results will improve  Description: Diagnostic test results will improve  Outcome: Ongoing     Problem: Bowel/Gastric:  Goal: Occurrences of nausea will decrease  Description: Occurrences of nausea will decrease  Outcome: Ongoing     Problem:  Bowel/Gastric:  Goal: Occurrences of vomiting will decrease  Description: Occurrences of vomiting will decrease  Outcome: Ongoing     Problem: Fluid Volume:  Goal: Maintenance of adequate hydration will improve  Description: Maintenance of adequate hydration will improve  Outcome: Ongoing     Problem: Fluid Volume:  Goal: Ability to maintain a balanced intake and output will improve  Description: Ability to maintain a balanced intake and output will improve  Outcome: Ongoing     Problem: Health Behavior:  Goal: Ability to state signs and symptoms to report to health care provider will improve  Description: Ability to state signs and symptoms to report to health care provider will improve  Outcome: Ongoing     Problem: Physical Regulation:  Goal: Diagnostic test results will improve  Description: Diagnostic test results will improve  Outcome: Ongoing     Problem: Sensory:  Goal: Pain level will decrease  Description: Pain level will decrease  Outcome: Ongoing     Problem: Sensory:  Goal: General experience of comfort will improve  Description: General experience of comfort will improve  Outcome: Ongoing     Problem: Coping:  Goal: Ability to adjust to condition or change in health will improve  Description: Ability to adjust to condition or change in health will improve  Outcome: Ongoing     Problem: Skin Integrity:  Goal: Risk for impaired skin integrity will decrease  Description: Risk for impaired skin integrity will decrease  Outcome: Ongoing     Problem: Skin Integrity:  Goal: Will show no infection signs and symptoms  Description: Will show no infection signs and symptoms  Outcome: Ongoing     Problem: Skin Integrity:  Goal: Absence of new skin breakdown  Description: Absence of new skin breakdown  Outcome: Ongoing

## 2021-10-29 NOTE — DISCHARGE INSTR - COC
Continuity of Care Form    Patient Name: Kelvin Pereira   :  1955  MRN:  [id number]    Admit date:  10/25/2021  Discharge date:  10-29-21    Code Status Order: Full Code   Advance Directives:      Admitting Physician:  Aria Perez MD  PCP: Raisa Soares MD    Discharging Nurse: [facility]/Room#: 2036/6-01  Discharging Unit Phone Number:     Emergency Contact:   Extended Emergency Contact Information  Primary Emergency Contact: [address] Phone: [phone]  Relation: Spouse  Secondary Emergency Contact: dean dill  Mobile Phone: [phone]  Relation: Brother/Sister    Past Surgical History:  Past Surgical History:   Procedure Laterality Date    COLONOSCOPY      HERNIA REPAIR      umbilical    HERNIA REPAIR  2016    incisional with mesh, robotic converted to open    TONSILLECTOMY         Immunization History:   Immunization History   Administered Date(s) Administered    COVID-19, [place], Primary or Immunocompromised, PF, 100mcg/0.5mL 03/10/2021, 2021    Tdap (Boostrix, Adacel) 2011       Active Problems:  Patient Active Problem List   Diagnosis Code    Incisional hernia K43.2    Intra-abdominal abscess ([facility] [address] K65.1    Diverticulitis of intestine with abscess K57.80    Mild malnutrition ([facility] [address] E44.1       Isolation/Infection:   Isolation            No Isolation          Patient Infection Status       None to display            Nurse Assessment:  Last Vital Signs: /78   Pulse 75   Temp 97.5 °F (36.4 °C) (Temporal)   Resp 18   Ht 5' 8\" (1.727 m)   Wt 195 lb (88.5 kg)   SpO2 92%   BMI 29.65 kg/m²     Last documented pain score (0-10 scale): Pain Level: 0  Last Weight:   Wt Readings from Last 1 Encounters:   10/29/21 195 lb (88.5 kg)     Mental Status:  {IP PT MENTAL STATUS:}    IV Access:  - midline left upper arm      Nursing Mobility/ADLs:  Walking   Independent  Transfer  Independent  Bathing Independent  Dressing  Independent  Toileting  Independent  Feeding  Independent  Med Admin  Independent  Med Delivery   whole    Wound Care Documentation and Therapy:        Elimination:  Continence:   · Bowel: Yes  · Bladder: Yes  Urinary Catheter: None   Colostomy/Ileostomy/Ileal Conduit: No       Date of Last BM: 10-29-21    Intake/Output Summary (Last 24 hours) at 10/29/2021 0911  Last data filed at 10/29/2021 0653  Gross per 24 hour   Intake 1370 ml   Output --   Net 1370 ml     I/O last 3 completed shifts: In: 3670 [P.O.:1040; I.V.:600; IV Piggyback:50]  Out: -     Safety Concerns:     None    Impairments/Disabilities:      None    Nutrition Therapy:  Current Nutrition Therapy:   - Oral Diet:  Low Fiber  - ***    Routes of Feeding: Oral  Liquids: No Restrictions  Daily Fluid Restriction: no  Last Modified Barium Swallow with Video (Video Swallowing Test): not done    Treatments at the Time of Hospital Discharge:   Respiratory Treatments:   Oxygen Therapy:  is not on home oxygen therapy. Ventilator:    - No ventilator support    Rehab Therapies:   Weight Bearing Status/Restrictions: No weight bearing restirctions  Other Medical Equipment (for information only, NOT a DME order):  {EQUIPMENT:[id number]}  Other Treatments: IV Invanz    Patient's personal belongings (please select all that are sent with patient):  {[facility] DME Belongings:[id number]}    RN SIGNATURE:  Electronically signed by Lisa Wren RN on 10/29/21 at 11:16 AM EDT    CASE MANAGEMENT/SOCIAL WORK SECTION    Inpatient Status Date: ***    Readmission Risk Assessment Score:  Readmission Risk              Risk of Unplanned Readmission:  13           Discharging to Facility/ Agency   Name:   [facility] Details  FAX           They will call in the morning and arrange time [address] [facility] [address]       Phone: [phone]       Fax: [phone]        ·   · [place] for IV antibiotics.   ·   [phone]  · They will call to arrange delivery this evening. / signature: Electronically signed by Meseret Porter RN on 10/29/21 at 12:56 PM EDT    PHYSICIAN SECTION    Prognosis: Fair    Condition at Discharge: Stable    Rehab Potential (if transferring to Rehab): Fair    Recommended Labs or Other Treatments After Discharge:     Physician Certification: I certify the above information and transfer of Epifanio Root  is necessary for the continuing treatment of the diagnosis listed and that she requires [address] for less 30 days.      Update Admission H&P: No change in H&P    PHYSICIAN SIGNATURE:  Electronically signed by Rosalba Knowles MD on 10/29/21 at 10:57 AM EDT

## 2021-10-29 NOTE — PLAN OF CARE
No complaints of pain or nausea. Fluids and antibiotics continued. Anticipate discharge today with outpatient IV Invanz.

## 2021-10-29 NOTE — CARE COORDINATION
Plan is home today with IV antibiotics. Invanz 1gm daily. Midline to be placed. [facility] is working on benefits. [facility] is checking staffing.

## 2021-10-29 NOTE — PROGRESS NOTES
[facility]    Adult Hospitalist      Name: Brent Young  MRN: [id number]     Acct: [de-identified]  Room: 2036/2036-01    Admit Date: 10/25/2021  5:25 PM  PCP: [place] MD    Primary Problem  Active Problems:    Diverticulitis of intestine with abscess    Mild malnutrition ([address]  Resolved Problems:    * No resolved hospital problems. *        Assesment:     · Acute recurrent diverticulitis  · Developing psoas abscess  · Intra-abdominal phlegmon, unable to drain  · Acute cystitis without hematuria  · Mixed hyperlipidemia  · Intermittent asthma  · Diabetes mellitus type 2  · Acute blood loss anemia  · Hyponatremia  · Hypoalbuminemia  · Acute dehydration-resolved        Plan:     · Admit progressive  · Monitor vitals closely  · Keep SPO2 above 90%  · I's and O's  · IV fluids  · Pain control  · N.p.o.  · Surgery consulted in ED  · Advance diet if okay with surgery  · Zosyn IV  · Antiemetics as needed  · Surgery consult  · ID consult  · CBC, BMP  · DC planning if ok w all   · Midline   · answered all qts  · DECLAN   · Abx per ID  · DVT and GI prophylaxis. Chief Complaint:     Chief Complaint   Patient presents with    Abdominal Pain     states diagnosis of diverticulitis in Feb.         History of Present Illness:        Pt seen and examined at bedside  Pt feels better  abd pain better too  Ambulating  Feeding some   C/o diarrhea   No more fever  Denies CP, cough, dyspnea  Denies vomiting, joint swelling, rash  Other ROS neg      Initial HPI  Brent Young is a 73 y.o.  female who presents with Abdominal Pain (states diagnosis of diverticulitis in Feb.)    Patient admitted through the emergency room where she presented with abdominal pain. Patient says she had been feeling weak for several days. She had fever yesterday and was seen by her PCP Dr. Eduardo Rogers in her office. Patient has prior history of recurrent diverticulitis.   Dr. Eduardo Rogers started her on oral antibiotics though she continued to worsen with her symptoms. Patient says she continued to have nausea and her weakness progressed. Patient was initially seen to have low blood pressure because of which a decision was made to admit to her ICU. Patient was monitored and given IV fluids. She was able to ambulate without any dizziness. Patient's brother is at bedside and says her blood pressure has remained systolic between  predominantly. She does not have any tachycardia and she does not have any clinical dehydration after initial IV fluids. Her heart rate has been in the 60s and 70s presently    Patient has had a bowel movement in the emergency room which showed hematochezia. She says the amount of blood has reduced over the last few days. Patient remained hemodynamically stable and then was able to tolerate her diet after which she was downgraded to progressive status. Patient denies any chest pain, dyspnea or orthopnea. Denies any headache, vision change or neck pain. Denies any vomiting or back pain. I have personally reviewed the past medical history, past surgical history, medications, social history, and family history, and summarized in the note. Review of Systems:     All 10 point system is reviewed and negative otherwise mentioned in HPI. Past Medical History:     Past Medical History:   Diagnosis Date    Arthritis     knee, back    Asthma     GERD (gastroesophageal reflux disease)         Past Surgical History:     Past Surgical History:   Procedure Laterality Date    COLONOSCOPY      HERNIA REPAIR      umbilical    HERNIA REPAIR  07/19/2016    incisional with mesh, robotic converted to open    TONSILLECTOMY          Medications Prior to Admission:       Prior to Admission medications    Medication Sig Start Date End Date Taking?  Authorizing Provider   ciprofloxacin (CIPRO) 500 MG tablet Take 1 tablet by mouth every 12 hours for 27 doses 10/21/21 11/4/21 Yes Ollie Wang MD   metroNIDAZOLE (FLAGYL) 500 MG tablet Take 1 tablet by mouth every 8 hours for 14 days 10/21/21 11/4/21 Yes Cain Pendleton MD   rosuvastatin (CRESTOR) 5 MG tablet Take 5 mg by mouth nightly   Yes Historical Provider, MD   metFORMIN (GLUCOPHAGE-XR) 500 MG extended release tablet Take 500 mg by mouth 2 times daily (with meals)  10/7/21  Yes Historical Provider, MD   omeprazole (PRILOSEC) 40 MG capsule Take 40 mg by mouth daily as needed    Yes Historical Provider, MD   Multiple Vitamins-Minerals (THERAPEUTIC MULTIVITAMIN-MINERALS) tablet Take 1 tablet by mouth daily   Yes Historical Provider, MD   albuterol sulfate  (90 BASE) MCG/ACT inhaler Inhale 2 puffs into the lungs every 6 hours as needed for Wheezing   Yes Historical Provider, MD        Allergies:       Sulfa antibiotics    Social History:     Tobacco:    reports that she has quit smoking. Her smoking use included cigarettes. She has a 0.75 pack-year smoking history. She has never used smokeless tobacco.  Alcohol:      reports current alcohol use. Drug Use:  reports no history of drug use. Family History:     History reviewed. No pertinent family history.       Physical Exam:     Vitals:  /78   Pulse 75   Temp 97.5 °F (36.4 °C) (Temporal)   Resp 18   Ht 5' 8\" (1.727 m)   Wt 195 lb (88.5 kg)   SpO2 92%   BMI 29.65 kg/m²   Temp (24hrs), Av.7 °F (36.5 °C), Min:97 °F (36.1 °C), Max:98.5 °F (36.9 °C)          General appearance - alert, well appearing, and in no acute distress  Mental status - oriented to person, place, and time with normal affect  Head - normocephalic and atraumatic  Eyes - pupils equal and reactive, extraocular eye movements intact, conjunctiva clear  Ears - hearing appears to be intact  Nose - no drainage noted  Mouth - mucous membranes moist  Neck - supple, no carotid bruits, thyroid not palpable  Chest - clear to auscultation, normal effort  Heart - normal rate, regular rhythm, no murmur  Abdomen - soft, non tender, no rebound, nondistended, of bowel. A small intraperitoneal abscess is present within the left lower quadrant, measuring 2.2 x 1.7 cm. 2. Enlargement of the left iliopsoas muscle, within the mid to lower pelvis, and extending to the left groin region. Phlegmonous changes are present, with a small abscess within the iliopsoas muscle anterior to the hip joint, and within the enlarged left pectineus muscle 3. Inflamed diverticulum involving the distal transverse colon, without evidence of free air or abscess formation in this area. All radiological studies reviewed                Code Status:  Full Code    Electronically signed by Shukri Anderson MD on 10/29/2021 at 10:44 AM     Copy sent to Dr. Tiffanie Cronin MD    This note was created with the assistance of a speech-recognition program.  Although the intention is to generate a document that actually reflects the content of the visit, no guarantees can be provided that every mistake has been identified and corrected by editing. Note was updated later by me after  physical examination and  completion of the assessment.

## 2021-10-31 LAB
CULTURE: NORMAL
CULTURE: NORMAL
Lab: NORMAL
Lab: NORMAL
SPECIMEN DESCRIPTION: NORMAL
SPECIMEN DESCRIPTION: NORMAL

## 2021-11-16 ENCOUNTER — TELEPHONE (OUTPATIENT)
Dept: INFECTIOUS DISEASES | Age: 66
End: 2021-11-16

## 2021-11-17 ENCOUNTER — OFFICE VISIT (OUTPATIENT)
Dept: INFECTIOUS DISEASES | Age: 66
End: 2021-11-17
Payer: COMMERCIAL

## 2021-11-17 VITALS
DIASTOLIC BLOOD PRESSURE: 71 MMHG | HEIGHT: 68 IN | RESPIRATION RATE: 18 BRPM | WEIGHT: 192 LBS | TEMPERATURE: 99.1 F | HEART RATE: 114 BPM | OXYGEN SATURATION: 92 % | SYSTOLIC BLOOD PRESSURE: 102 MMHG | BODY MASS INDEX: 29.1 KG/M2

## 2021-11-17 DIAGNOSIS — K57.20 DIVERTICULITIS OF LARGE INTESTINE WITH ABSCESS WITHOUT BLEEDING: Primary | ICD-10-CM

## 2021-11-17 PROCEDURE — G8427 DOCREV CUR MEDS BY ELIG CLIN: HCPCS | Performed by: INTERNAL MEDICINE

## 2021-11-17 PROCEDURE — G8417 CALC BMI ABV UP PARAM F/U: HCPCS | Performed by: INTERNAL MEDICINE

## 2021-11-17 PROCEDURE — 4040F PNEUMOC VAC/ADMIN/RCVD: CPT | Performed by: INTERNAL MEDICINE

## 2021-11-17 PROCEDURE — 1123F ACP DISCUSS/DSCN MKR DOCD: CPT | Performed by: INTERNAL MEDICINE

## 2021-11-17 PROCEDURE — 3017F COLORECTAL CA SCREEN DOC REV: CPT | Performed by: INTERNAL MEDICINE

## 2021-11-17 PROCEDURE — G8400 PT W/DXA NO RESULTS DOC: HCPCS | Performed by: INTERNAL MEDICINE

## 2021-11-17 PROCEDURE — 1036F TOBACCO NON-USER: CPT | Performed by: INTERNAL MEDICINE

## 2021-11-17 PROCEDURE — 1111F DSCHRG MED/CURRENT MED MERGE: CPT | Performed by: INTERNAL MEDICINE

## 2021-11-17 PROCEDURE — G8484 FLU IMMUNIZE NO ADMIN: HCPCS | Performed by: INTERNAL MEDICINE

## 2021-11-17 PROCEDURE — 1090F PRES/ABSN URINE INCON ASSESS: CPT | Performed by: INTERNAL MEDICINE

## 2021-11-17 PROCEDURE — 99214 OFFICE O/P EST MOD 30 MIN: CPT | Performed by: INTERNAL MEDICINE

## 2021-11-17 ASSESSMENT — ENCOUNTER SYMPTOMS: RESPIRATORY NEGATIVE: 1

## 2021-11-17 NOTE — PROGRESS NOTES
[facility]  Office Progress Note  Today's Date and Time: 11/17/2021, 9:42 AM    Impression:     1. Diverticulitis of large intestine with abscess without bleeding       Recommendations   · The patient completed a course of Zosyn after failing a course of levofloxacin and metronidazole and had difficulty tolerating these in the past.  · The patient still does have some abdominal discomfort and this does require his concern for residual infection. · I will order a CT scan of the abdomen pelvis to follow-up the diverticulitis/abscess concerns-findings  · I will reach out to the patient once the imaging has been completed    I have ordered the following medications/ labs:  Orders Placed This Encounter   Procedures    CT ABDOMEN PELVIS W IV CONTRAST Additional Contrast? Oral     Standing Status:   Future     Standing Expiration Date:   2/17/2022     Order Specific Question:   Additional Contrast?     Answer:   Oral     Order Specific Question:   Reason for exam:     Answer:   f/u abscess      No orders of the defined types were placed in this encounter. Chief complaint/reason for consultation:     Chief Complaint   Patient presents with    Vascular Access Problem     pt. stated they werent sure who she was suppsoed to be instructed by to get midline out today medication ended on 11/12    Follow-Up from Hospital     diverticulitis         History of Present Illness:   Robin Hernandez is a 73y.o.-year-old female who I am seeing in follow-up and has a history of recurrent/persistent diverticulitis for which she was discharged on IV antimicrobial therapy with ertapenem after prior discharge on oral antibiotics did not yield good results. The patient is seen and evaluated and is otherwise doing okay does not report any acute issues or concerns. She continues to have some abdominal discomfort/pain.     She completed a course of IV antibiotics on 11/12/2021 and is currently off oral antibiotics. She does not report any subjective fevers, chills, sweats. I have personally reviewedthe past medical history, medications, social history, and I have updated the database accordingly. Past Medical History:     Past Medical History:   Diagnosis Date    Arthritis     knee, back    Asthma     GERD (gastroesophageal reflux disease)      Medications:     Current Outpatient Medications   Medication Sig Dispense Refill    rosuvastatin (CRESTOR) 5 MG tablet Take 5 mg by mouth nightly      metFORMIN (GLUCOPHAGE-XR) 500 MG extended release tablet Take 500 mg by mouth 2 times daily (with meals)       omeprazole (PRILOSEC) 40 MG capsule Take 40 mg by mouth daily as needed       Multiple Vitamins-Minerals (THERAPEUTIC MULTIVITAMIN-MINERALS) tablet Take 1 tablet by mouth daily      albuterol sulfate  (90 BASE) MCG/ACT inhaler Inhale 2 puffs into the lungs every 6 hours as needed for Wheezing       No current facility-administered medications for this visit. Allergies:   Sulfa antibiotics     Review of Systems:   Review of Systems   Constitutional: Negative. Respiratory: Negative. Cardiovascular: Negative. Gastrointestinal: Positive for abdominal pain. Genitourinary: Negative. Musculoskeletal: Negative. Skin: Negative. Neurological: Negative. Psychiatric/Behavioral: Negative. Physical Examination :   /71 (Site: Right Upper Arm, Position: Sitting, Cuff Size: Medium Adult)   Pulse 114   Temp 99.1 °F (37.3 °C) (Temporal)   Resp 18   Ht 5' 8\" (1.727 m)   Wt 192 lb (87.1 kg)   SpO2 92% Comment: room air at rest  BMI 29.19 kg/m²     Physical Exam  Constitutional:       Appearance: She is well-developed. HENT:      Head: Normocephalic and atraumatic. Cardiovascular:      Rate and Rhythm: Regular rhythm. Heart sounds: Normal heart sounds. Pulmonary:      Effort: Pulmonary effort is normal.      Breath sounds: Normal breath sounds.    Abdominal: General: Bowel sounds are normal.      Palpations: Abdomen is soft. Tenderness: There is abdominal tenderness. Musculoskeletal:      Cervical back: Normal range of motion and neck supple. Skin:     General: Skin is warm and dry. Neurological:      Mental Status: She is alert and oriented to person, place, and time. Laboratory studies :  Medical Decision Making:   I have independently reviewed the following labs:  CBC with Differential:  Lab Results   Component Value Date    WBC 5.2 10/29/2021    WBC 8.3 10/28/2021    HGB 8.8 10/29/2021    HGB 8.7 10/28/2021    HCT 28.4 10/29/2021    HCT 28.6 10/28/2021     10/29/2021     10/28/2021    LYMPHOPCT 24 10/29/2021    LYMPHOPCT 16 10/28/2021    MONOPCT 10 10/29/2021    MONOPCT 9 10/28/2021       BMP:  Lab Results   Component Value Date     10/29/2021     10/28/2021    K 4.3 10/29/2021    K 4.0 10/28/2021     10/29/2021     10/28/2021    CO2 24 10/29/2021    CO2 25 10/28/2021    BUN 5 10/29/2021    BUN 6 10/28/2021    CREATININE 0.51 10/29/2021    CREATININE 0.51 10/28/2021       Hepatic Function Panel:   Lab Results   Component Value Date    PROT 6.9 10/25/2021    PROT 7.4 10/18/2021    LABALBU 2.9 10/25/2021    LABALBU 3.2 10/18/2021    BILIDIR <0.08 10/25/2021    IBILI CANNOT BE CALCULATED 10/25/2021    BILITOT 0.22 10/25/2021    BILITOT 0.18 10/18/2021    ALKPHOS 79 10/25/2021    ALKPHOS 110 10/18/2021    ALT 9 10/25/2021    ALT 20 10/18/2021    AST 12 10/25/2021    AST 25 10/18/2021       Lab Results   Component Value Date    CRP 69.5 10/19/2021     Lab Results   Component Value Date    SEDRATE 88 10/19/2021         Thank you for allowing us to participate in the care of this patient. Pleasecall with questions.     Kahlil Bachelor MD  [employer] messaging: [phone]    This note is created with the assistance of a speech recognition program.  While intending to generate a document that actually reflects the content ofthe visit, the document can still have some errors including those of syntax and sound a like substitutions which may escape proof reading. It such instances, actual meaning can be extrapolated by contextual diversion.

## 2021-11-21 LAB
CULTURE: NORMAL
Lab: NORMAL
SPECIMEN DESCRIPTION: NORMAL

## 2021-11-28 NOTE — DISCHARGE SUMMARY
spent in preparing discharge papers, discussing discharge with patient, medication review, etc.      Signed:  Samuel Bains MD MD, FAAFP  11/28/2021  12:16 AM

## 2021-12-07 ENCOUNTER — TELEPHONE (OUTPATIENT)
Dept: INFECTIOUS DISEASES | Age: 66
End: 2021-12-07

## 2021-12-07 DIAGNOSIS — K57.20 DIVERTICULITIS OF LARGE INTESTINE WITH ABSCESS WITHOUT BLEEDING: ICD-10-CM

## 2021-12-07 NOTE — TELEPHONE ENCOUNTER
[facility] called, Radiology is calling in a STAT result. They are faxing the report and are pushing the films to PACS. Next appt is 12/16/21 and its a virtual visit. Report is:     Grossly abnormal study. Wall thickening, lumin irregularity and inflammatory changes associated with descending colon, sigmoid, rectosigmoid. May represent infection of inflammatory eitiology. Cannot exclude underlying neoplasm. Worsening compared to 10/2/21 CT. Multiple air fluid level abscess. Retroperitoneal space and medial inferior lateral to sigmoid colon involving left ilicus muscle, left psoas muscle and musculature involving left hip and left groin. Worsening from previous study.

## 2021-12-08 NOTE — TELEPHONE ENCOUNTER
I called Itzel Rankin and spoke to her over the phone this morning 12/8/2021. The patient tells me that she actually has not been feeling well. She is scheduled for colon surgery tomorrow at [facility] will follow up with her there

## 2021-12-09 ENCOUNTER — ANESTHESIA (OUTPATIENT)
Dept: OPERATING ROOM | Age: 66
DRG: 329 | End: 2021-12-09
Payer: COMMERCIAL

## 2021-12-09 ENCOUNTER — ANESTHESIA EVENT (OUTPATIENT)
Dept: OPERATING ROOM | Age: 66
DRG: 329 | End: 2021-12-09
Payer: COMMERCIAL

## 2021-12-09 ENCOUNTER — HOSPITAL ENCOUNTER (INPATIENT)
Age: 66
LOS: 5 days | Discharge: HOME HEALTH | DRG: 329 | End: 2021-12-14
Attending: SURGERY | Admitting: SURGERY
Payer: COMMERCIAL

## 2021-12-09 VITALS — DIASTOLIC BLOOD PRESSURE: 72 MMHG | TEMPERATURE: 86 F | SYSTOLIC BLOOD PRESSURE: 122 MMHG | OXYGEN SATURATION: 100 %

## 2021-12-09 DIAGNOSIS — Z90.49 S/P COLECTOMY: ICD-10-CM

## 2021-12-09 DIAGNOSIS — Z41.9 SURGERY, ELECTIVE: Primary | ICD-10-CM

## 2021-12-09 PROBLEM — K57.90 DIVERTICULAR DISEASE: Status: ACTIVE | Noted: 2021-12-09

## 2021-12-09 LAB
ABO/RH: NORMAL
ABSOLUTE EOS #: 0.04 K/UL (ref 0–0.44)
ABSOLUTE IMMATURE GRANULOCYTE: 0.03 K/UL (ref 0–0.3)
ABSOLUTE LYMPH #: 1.83 K/UL (ref 1.1–3.7)
ABSOLUTE MONO #: 1.19 K/UL (ref 0.1–1.2)
ANION GAP SERPL CALCULATED.3IONS-SCNC: 16 MMOL/L (ref 9–17)
ANTIBODY SCREEN: NEGATIVE
ARM BAND NUMBER: NORMAL
BASOPHILS # BLD: 0 % (ref 0–2)
BASOPHILS ABSOLUTE: 0.04 K/UL (ref 0–0.2)
BUN BLDV-MCNC: 14 MG/DL (ref 8–23)
BUN/CREAT BLD: 22 (ref 9–20)
CALCIUM SERPL-MCNC: 9.3 MG/DL (ref 8.6–10.4)
CHLORIDE BLD-SCNC: 95 MMOL/L (ref 98–107)
CO2: 21 MMOL/L (ref 20–31)
CREAT SERPL-MCNC: 0.65 MG/DL (ref 0.5–0.9)
DIFFERENTIAL TYPE: ABNORMAL
EOSINOPHILS RELATIVE PERCENT: 0 % (ref 1–4)
EXPIRATION DATE: NORMAL
GFR AFRICAN AMERICAN: >60 ML/MIN
GFR NON-AFRICAN AMERICAN: >60 ML/MIN
GFR SERPL CREATININE-BSD FRML MDRD: ABNORMAL ML/MIN/{1.73_M2}
GFR SERPL CREATININE-BSD FRML MDRD: ABNORMAL ML/MIN/{1.73_M2}
GLUCOSE BLD-MCNC: 137 MG/DL (ref 70–99)
GLUCOSE BLD-MCNC: 149 MG/DL (ref 65–105)
HCT VFR BLD CALC: 33.9 % (ref 36.3–47.1)
HEMOGLOBIN: 10.7 G/DL (ref 11.9–15.1)
IMMATURE GRANULOCYTES: 0 %
INR BLD: 1.3
LYMPHOCYTES # BLD: 15 % (ref 24–43)
MCH RBC QN AUTO: 26.5 PG (ref 25.2–33.5)
MCHC RBC AUTO-ENTMCNC: 31.6 G/DL (ref 28.4–34.8)
MCV RBC AUTO: 83.9 FL (ref 82.6–102.9)
MONOCYTES # BLD: 10 % (ref 3–12)
NRBC AUTOMATED: 0 PER 100 WBC
PDW BLD-RTO: 15.7 % (ref 11.8–14.4)
PLATELET # BLD: 419 K/UL (ref 138–453)
PLATELET ESTIMATE: ABNORMAL
PMV BLD AUTO: 8.7 FL (ref 8.1–13.5)
POTASSIUM SERPL-SCNC: 3.5 MMOL/L (ref 3.7–5.3)
PROTHROMBIN TIME: 16.3 SEC (ref 11.5–14.2)
RBC # BLD: 4.04 M/UL (ref 3.95–5.11)
RBC # BLD: ABNORMAL 10*6/UL
SEG NEUTROPHILS: 75 % (ref 36–65)
SEGMENTED NEUTROPHILS ABSOLUTE COUNT: 8.97 K/UL (ref 1.5–8.1)
SODIUM BLD-SCNC: 132 MMOL/L (ref 135–144)
WBC # BLD: 12.1 K/UL (ref 3.5–11.3)
WBC # BLD: ABNORMAL 10*3/UL

## 2021-12-09 PROCEDURE — 6360000002 HC RX W HCPCS: Performed by: ANESTHESIOLOGY

## 2021-12-09 PROCEDURE — C1765 ADHESION BARRIER: HCPCS | Performed by: SURGERY

## 2021-12-09 PROCEDURE — 85025 COMPLETE CBC W/AUTO DIFF WBC: CPT

## 2021-12-09 PROCEDURE — 2500000003 HC RX 250 WO HCPCS: Performed by: STUDENT IN AN ORGANIZED HEALTH CARE EDUCATION/TRAINING PROGRAM

## 2021-12-09 PROCEDURE — 2580000003 HC RX 258: Performed by: ANESTHESIOLOGY

## 2021-12-09 PROCEDURE — 2500000003 HC RX 250 WO HCPCS: Performed by: SURGERY

## 2021-12-09 PROCEDURE — 3600000003 HC SURGERY LEVEL 3 BASE: Performed by: SURGERY

## 2021-12-09 PROCEDURE — 99213 OFFICE O/P EST LOW 20 MIN: CPT

## 2021-12-09 PROCEDURE — 3600000013 HC SURGERY LEVEL 3 ADDTL 15MIN: Performed by: SURGERY

## 2021-12-09 PROCEDURE — 2500000003 HC RX 250 WO HCPCS: Performed by: NURSE ANESTHETIST, CERTIFIED REGISTERED

## 2021-12-09 PROCEDURE — 7100000000 HC PACU RECOVERY - FIRST 15 MIN: Performed by: SURGERY

## 2021-12-09 PROCEDURE — 6360000002 HC RX W HCPCS: Performed by: NURSE ANESTHETIST, CERTIFIED REGISTERED

## 2021-12-09 PROCEDURE — 80048 BASIC METABOLIC PNL TOTAL CA: CPT

## 2021-12-09 PROCEDURE — 6370000000 HC RX 637 (ALT 250 FOR IP): Performed by: STUDENT IN AN ORGANIZED HEALTH CARE EDUCATION/TRAINING PROGRAM

## 2021-12-09 PROCEDURE — 36415 COLL VENOUS BLD VENIPUNCTURE: CPT

## 2021-12-09 PROCEDURE — 88307 TISSUE EXAM BY PATHOLOGIST: CPT

## 2021-12-09 PROCEDURE — 0DBE0ZZ EXCISION OF LARGE INTESTINE, OPEN APPROACH: ICD-10-PCS | Performed by: SURGERY

## 2021-12-09 PROCEDURE — 3700000000 HC ANESTHESIA ATTENDED CARE: Performed by: SURGERY

## 2021-12-09 PROCEDURE — 86900 BLOOD TYPING SEROLOGIC ABO: CPT

## 2021-12-09 PROCEDURE — 1200000000 HC SEMI PRIVATE

## 2021-12-09 PROCEDURE — 82947 ASSAY GLUCOSE BLOOD QUANT: CPT

## 2021-12-09 PROCEDURE — 86901 BLOOD TYPING SEROLOGIC RH(D): CPT

## 2021-12-09 PROCEDURE — 86850 RBC ANTIBODY SCREEN: CPT

## 2021-12-09 PROCEDURE — 3700000001 HC ADD 15 MINUTES (ANESTHESIA): Performed by: SURGERY

## 2021-12-09 PROCEDURE — 7100000001 HC PACU RECOVERY - ADDTL 15 MIN: Performed by: SURGERY

## 2021-12-09 PROCEDURE — 2720000010 HC SURG SUPPLY STERILE: Performed by: SURGERY

## 2021-12-09 PROCEDURE — 85610 PROTHROMBIN TIME: CPT

## 2021-12-09 PROCEDURE — 2709999900 HC NON-CHARGEABLE SUPPLY: Performed by: SURGERY

## 2021-12-09 PROCEDURE — 2580000003 HC RX 258: Performed by: SURGERY

## 2021-12-09 PROCEDURE — 6360000002 HC RX W HCPCS: Performed by: SURGERY

## 2021-12-09 RX ORDER — ACETAMINOPHEN 500 MG
1000 TABLET ORAL EVERY 8 HOURS SCHEDULED
Status: DISCONTINUED | OUTPATIENT
Start: 2021-12-09 | End: 2021-12-14 | Stop reason: HOSPADM

## 2021-12-09 RX ORDER — SODIUM CHLORIDE, SODIUM LACTATE, POTASSIUM CHLORIDE, CALCIUM CHLORIDE 600; 310; 30; 20 MG/100ML; MG/100ML; MG/100ML; MG/100ML
INJECTION, SOLUTION INTRAVENOUS CONTINUOUS
Status: DISCONTINUED | OUTPATIENT
Start: 2021-12-09 | End: 2021-12-09

## 2021-12-09 RX ORDER — ONDANSETRON 2 MG/ML
4 INJECTION INTRAMUSCULAR; INTRAVENOUS
Status: COMPLETED | OUTPATIENT
Start: 2021-12-09 | End: 2021-12-09

## 2021-12-09 RX ORDER — KETOROLAC TROMETHAMINE 15 MG/ML
15 INJECTION, SOLUTION INTRAMUSCULAR; INTRAVENOUS EVERY 6 HOURS
Status: CANCELLED | OUTPATIENT
Start: 2021-12-09 | End: 2021-12-12

## 2021-12-09 RX ORDER — OXYCODONE HYDROCHLORIDE 5 MG/1
5 TABLET ORAL EVERY 6 HOURS PRN
Status: DISCONTINUED | OUTPATIENT
Start: 2021-12-09 | End: 2021-12-14 | Stop reason: HOSPADM

## 2021-12-09 RX ORDER — ROCURONIUM BROMIDE 10 MG/ML
INJECTION, SOLUTION INTRAVENOUS PRN
Status: DISCONTINUED | OUTPATIENT
Start: 2021-12-09 | End: 2021-12-09 | Stop reason: SDUPTHER

## 2021-12-09 RX ORDER — MORPHINE SULFATE 2 MG/ML
2 INJECTION, SOLUTION INTRAMUSCULAR; INTRAVENOUS
Status: DISCONTINUED | OUTPATIENT
Start: 2021-12-09 | End: 2021-12-14

## 2021-12-09 RX ORDER — SODIUM CHLORIDE 0.9 % (FLUSH) 0.9 %
10 SYRINGE (ML) INJECTION EVERY 12 HOURS SCHEDULED
Status: DISCONTINUED | OUTPATIENT
Start: 2021-12-09 | End: 2021-12-09

## 2021-12-09 RX ORDER — ALBUTEROL SULFATE 90 UG/1
2 AEROSOL, METERED RESPIRATORY (INHALATION) EVERY 6 HOURS PRN
Status: DISCONTINUED | OUTPATIENT
Start: 2021-12-09 | End: 2021-12-10

## 2021-12-09 RX ORDER — SODIUM CHLORIDE 9 MG/ML
25 INJECTION, SOLUTION INTRAVENOUS PRN
Status: DISCONTINUED | OUTPATIENT
Start: 2021-12-09 | End: 2021-12-14 | Stop reason: HOSPADM

## 2021-12-09 RX ORDER — HYDROMORPHONE HYDROCHLORIDE 1 MG/ML
0.25 INJECTION, SOLUTION INTRAMUSCULAR; INTRAVENOUS; SUBCUTANEOUS EVERY 5 MIN PRN
Status: COMPLETED | OUTPATIENT
Start: 2021-12-09 | End: 2021-12-09

## 2021-12-09 RX ORDER — SODIUM CHLORIDE 9 MG/ML
25 INJECTION, SOLUTION INTRAVENOUS PRN
Status: DISCONTINUED | OUTPATIENT
Start: 2021-12-09 | End: 2021-12-09

## 2021-12-09 RX ORDER — PROPOFOL 10 MG/ML
INJECTION, EMULSION INTRAVENOUS PRN
Status: DISCONTINUED | OUTPATIENT
Start: 2021-12-09 | End: 2021-12-09 | Stop reason: SDUPTHER

## 2021-12-09 RX ORDER — FENTANYL CITRATE 50 UG/ML
25 INJECTION, SOLUTION INTRAMUSCULAR; INTRAVENOUS EVERY 5 MIN PRN
Status: DISCONTINUED | OUTPATIENT
Start: 2021-12-09 | End: 2021-12-09

## 2021-12-09 RX ORDER — MIDAZOLAM HYDROCHLORIDE 1 MG/ML
2 INJECTION INTRAMUSCULAR; INTRAVENOUS ONCE
Status: COMPLETED | OUTPATIENT
Start: 2021-12-09 | End: 2021-12-09

## 2021-12-09 RX ORDER — ONDANSETRON 2 MG/ML
4 INJECTION INTRAMUSCULAR; INTRAVENOUS
Status: DISCONTINUED | OUTPATIENT
Start: 2021-12-09 | End: 2021-12-09

## 2021-12-09 RX ORDER — HYDROMORPHONE HYDROCHLORIDE 1 MG/ML
0.25 INJECTION, SOLUTION INTRAMUSCULAR; INTRAVENOUS; SUBCUTANEOUS EVERY 5 MIN PRN
Status: DISCONTINUED | OUTPATIENT
Start: 2021-12-09 | End: 2021-12-09

## 2021-12-09 RX ORDER — DEXTROSE, SODIUM CHLORIDE, AND POTASSIUM CHLORIDE 5; .45; .15 G/100ML; G/100ML; G/100ML
INJECTION INTRAVENOUS CONTINUOUS
Status: DISCONTINUED | OUTPATIENT
Start: 2021-12-09 | End: 2021-12-13

## 2021-12-09 RX ORDER — ONDANSETRON 2 MG/ML
INJECTION INTRAMUSCULAR; INTRAVENOUS PRN
Status: DISCONTINUED | OUTPATIENT
Start: 2021-12-09 | End: 2021-12-09 | Stop reason: SDUPTHER

## 2021-12-09 RX ORDER — MORPHINE SULFATE 4 MG/ML
4 INJECTION, SOLUTION INTRAMUSCULAR; INTRAVENOUS
Status: DISCONTINUED | OUTPATIENT
Start: 2021-12-09 | End: 2021-12-14

## 2021-12-09 RX ORDER — MIDAZOLAM HYDROCHLORIDE 1 MG/ML
INJECTION INTRAMUSCULAR; INTRAVENOUS PRN
Status: DISCONTINUED | OUTPATIENT
Start: 2021-12-09 | End: 2021-12-09 | Stop reason: SDUPTHER

## 2021-12-09 RX ORDER — SODIUM CHLORIDE 0.9 % (FLUSH) 0.9 %
5-40 SYRINGE (ML) INJECTION PRN
Status: DISCONTINUED | OUTPATIENT
Start: 2021-12-09 | End: 2021-12-14 | Stop reason: HOSPADM

## 2021-12-09 RX ORDER — SODIUM CHLORIDE 0.9 % (FLUSH) 0.9 %
5-40 SYRINGE (ML) INJECTION EVERY 12 HOURS SCHEDULED
Status: DISCONTINUED | OUTPATIENT
Start: 2021-12-09 | End: 2021-12-14 | Stop reason: HOSPADM

## 2021-12-09 RX ORDER — ROSUVASTATIN CALCIUM 10 MG/1
5 TABLET, COATED ORAL NIGHTLY
Status: DISCONTINUED | OUTPATIENT
Start: 2021-12-09 | End: 2021-12-14 | Stop reason: HOSPADM

## 2021-12-09 RX ORDER — ONDANSETRON 4 MG/1
4 TABLET, ORALLY DISINTEGRATING ORAL EVERY 8 HOURS PRN
Status: DISCONTINUED | OUTPATIENT
Start: 2021-12-09 | End: 2021-12-14 | Stop reason: HOSPADM

## 2021-12-09 RX ORDER — LIDOCAINE HYDROCHLORIDE 20 MG/ML
INJECTION, SOLUTION EPIDURAL; INFILTRATION; INTRACAUDAL; PERINEURAL PRN
Status: DISCONTINUED | OUTPATIENT
Start: 2021-12-09 | End: 2021-12-09 | Stop reason: SDUPTHER

## 2021-12-09 RX ORDER — ONDANSETRON 2 MG/ML
4 INJECTION INTRAMUSCULAR; INTRAVENOUS EVERY 6 HOURS PRN
Status: DISCONTINUED | OUTPATIENT
Start: 2021-12-09 | End: 2021-12-14 | Stop reason: HOSPADM

## 2021-12-09 RX ORDER — PROPOFOL 10 MG/ML
INJECTION, EMULSION INTRAVENOUS CONTINUOUS PRN
Status: DISCONTINUED | OUTPATIENT
Start: 2021-12-09 | End: 2021-12-09 | Stop reason: SDUPTHER

## 2021-12-09 RX ORDER — FENTANYL CITRATE 50 UG/ML
INJECTION, SOLUTION INTRAMUSCULAR; INTRAVENOUS PRN
Status: DISCONTINUED | OUTPATIENT
Start: 2021-12-09 | End: 2021-12-09 | Stop reason: SDUPTHER

## 2021-12-09 RX ORDER — SODIUM CHLORIDE 0.9 % (FLUSH) 0.9 %
10 SYRINGE (ML) INJECTION PRN
Status: DISCONTINUED | OUTPATIENT
Start: 2021-12-09 | End: 2021-12-09

## 2021-12-09 RX ORDER — LIDOCAINE HYDROCHLORIDE 10 MG/ML
1 INJECTION, SOLUTION EPIDURAL; INFILTRATION; INTRACAUDAL; PERINEURAL
Status: DISCONTINUED | OUTPATIENT
Start: 2021-12-09 | End: 2021-12-09

## 2021-12-09 RX ORDER — BUPIVACAINE HYDROCHLORIDE AND EPINEPHRINE 5; 5 MG/ML; UG/ML
INJECTION, SOLUTION EPIDURAL; INTRACAUDAL; PERINEURAL PRN
Status: DISCONTINUED | OUTPATIENT
Start: 2021-12-09 | End: 2021-12-09 | Stop reason: HOSPADM

## 2021-12-09 RX ORDER — SODIUM CHLORIDE 9 MG/ML
INJECTION, SOLUTION INTRAVENOUS CONTINUOUS
Status: DISCONTINUED | OUTPATIENT
Start: 2021-12-09 | End: 2021-12-09

## 2021-12-09 RX ADMIN — CEFAZOLIN SODIUM 2000 MG: 10 INJECTION, POWDER, FOR SOLUTION INTRAVENOUS at 17:48

## 2021-12-09 RX ADMIN — FENTANYL CITRATE 50 MCG: 50 INJECTION, SOLUTION INTRAMUSCULAR; INTRAVENOUS at 18:46

## 2021-12-09 RX ADMIN — HYDROMORPHONE HYDROCHLORIDE 0.25 MG: 1 INJECTION, SOLUTION INTRAMUSCULAR; INTRAVENOUS; SUBCUTANEOUS at 20:43

## 2021-12-09 RX ADMIN — PROPOFOL 25 MCG/KG/MIN: 10 INJECTION, EMULSION INTRAVENOUS at 17:44

## 2021-12-09 RX ADMIN — HYDROMORPHONE HYDROCHLORIDE 0.25 MG: 1 INJECTION, SOLUTION INTRAMUSCULAR; INTRAVENOUS; SUBCUTANEOUS at 20:23

## 2021-12-09 RX ADMIN — METRONIDAZOLE 500 MG: 500 INJECTION, SOLUTION INTRAVENOUS at 18:02

## 2021-12-09 RX ADMIN — FENTANYL CITRATE 100 MCG: 50 INJECTION, SOLUTION INTRAMUSCULAR; INTRAVENOUS at 17:38

## 2021-12-09 RX ADMIN — SODIUM CHLORIDE, POTASSIUM CHLORIDE, SODIUM LACTATE AND CALCIUM CHLORIDE: 600; 310; 30; 20 INJECTION, SOLUTION INTRAVENOUS at 13:30

## 2021-12-09 RX ADMIN — MIDAZOLAM 2 MG: 1 INJECTION INTRAMUSCULAR; INTRAVENOUS at 15:30

## 2021-12-09 RX ADMIN — ROSUVASTATIN CALCIUM 5 MG: 10 TABLET, FILM COATED ORAL at 22:00

## 2021-12-09 RX ADMIN — SUGAMMADEX 200 MG: 100 INJECTION, SOLUTION INTRAVENOUS at 19:55

## 2021-12-09 RX ADMIN — POTASSIUM CHLORIDE, DEXTROSE MONOHYDRATE AND SODIUM CHLORIDE: 150; 5; 450 INJECTION, SOLUTION INTRAVENOUS at 22:05

## 2021-12-09 RX ADMIN — PROPOFOL 140 MG: 10 INJECTION, EMULSION INTRAVENOUS at 17:38

## 2021-12-09 RX ADMIN — SODIUM CHLORIDE, POTASSIUM CHLORIDE, SODIUM LACTATE AND CALCIUM CHLORIDE: 600; 310; 30; 20 INJECTION, SOLUTION INTRAVENOUS at 19:30

## 2021-12-09 RX ADMIN — HYDROMORPHONE HYDROCHLORIDE 0.25 MG: 1 INJECTION, SOLUTION INTRAMUSCULAR; INTRAVENOUS; SUBCUTANEOUS at 20:34

## 2021-12-09 RX ADMIN — FENTANYL CITRATE 50 MCG: 50 INJECTION, SOLUTION INTRAMUSCULAR; INTRAVENOUS at 19:34

## 2021-12-09 RX ADMIN — ACETAMINOPHEN 1000 MG: 500 TABLET ORAL at 22:00

## 2021-12-09 RX ADMIN — ROCURONIUM BROMIDE 50 MG: 10 INJECTION, SOLUTION INTRAVENOUS at 17:38

## 2021-12-09 RX ADMIN — OXYCODONE 5 MG: 5 TABLET ORAL at 22:04

## 2021-12-09 RX ADMIN — HYDROMORPHONE HYDROCHLORIDE 0.25 MG: 1 INJECTION, SOLUTION INTRAMUSCULAR; INTRAVENOUS; SUBCUTANEOUS at 20:28

## 2021-12-09 RX ADMIN — FENTANYL CITRATE 50 MCG: 50 INJECTION, SOLUTION INTRAMUSCULAR; INTRAVENOUS at 19:42

## 2021-12-09 RX ADMIN — ONDANSETRON 4 MG: 2 INJECTION INTRAMUSCULAR; INTRAVENOUS at 20:43

## 2021-12-09 RX ADMIN — ONDANSETRON 4 MG: 2 INJECTION, SOLUTION INTRAMUSCULAR; INTRAVENOUS at 19:50

## 2021-12-09 RX ADMIN — MIDAZOLAM 2 MG: 1 INJECTION INTRAMUSCULAR; INTRAVENOUS at 17:33

## 2021-12-09 RX ADMIN — LIDOCAINE HYDROCHLORIDE 100 MG: 20 INJECTION, SOLUTION EPIDURAL; INFILTRATION; INTRACAUDAL; PERINEURAL at 17:38

## 2021-12-09 ASSESSMENT — PULMONARY FUNCTION TESTS
PIF_VALUE: 25
PIF_VALUE: 20
PIF_VALUE: 25
PIF_VALUE: 21
PIF_VALUE: 22
PIF_VALUE: 19
PIF_VALUE: 23
PIF_VALUE: 18
PIF_VALUE: 3
PIF_VALUE: 6
PIF_VALUE: 21
PIF_VALUE: 24
PIF_VALUE: 23
PIF_VALUE: 4
PIF_VALUE: 23
PIF_VALUE: 24
PIF_VALUE: 23
PIF_VALUE: 24
PIF_VALUE: 17
PIF_VALUE: 22
PIF_VALUE: 24
PIF_VALUE: 17
PIF_VALUE: 25
PIF_VALUE: 19
PIF_VALUE: 24
PIF_VALUE: 23
PIF_VALUE: 23
PIF_VALUE: 21
PIF_VALUE: 24
PIF_VALUE: 2
PIF_VALUE: 22
PIF_VALUE: 23
PIF_VALUE: 19
PIF_VALUE: 0
PIF_VALUE: 24
PIF_VALUE: 0
PIF_VALUE: 22
PIF_VALUE: 19
PIF_VALUE: 25
PIF_VALUE: 24
PIF_VALUE: 23
PIF_VALUE: 19
PIF_VALUE: 23
PIF_VALUE: 19
PIF_VALUE: 24
PIF_VALUE: 18
PIF_VALUE: 3
PIF_VALUE: 22
PIF_VALUE: 19
PIF_VALUE: 19
PIF_VALUE: 22
PIF_VALUE: 23
PIF_VALUE: 23
PIF_VALUE: 18
PIF_VALUE: 18
PIF_VALUE: 2
PIF_VALUE: 19
PIF_VALUE: 2
PIF_VALUE: 24
PIF_VALUE: 24
PIF_VALUE: 21
PIF_VALUE: 23
PIF_VALUE: 21
PIF_VALUE: 23
PIF_VALUE: 21
PIF_VALUE: 25
PIF_VALUE: 21
PIF_VALUE: 19
PIF_VALUE: 24
PIF_VALUE: 0
PIF_VALUE: 15
PIF_VALUE: 15
PIF_VALUE: 23
PIF_VALUE: 18
PIF_VALUE: 21
PIF_VALUE: 17
PIF_VALUE: 19
PIF_VALUE: 19
PIF_VALUE: 23
PIF_VALUE: 23
PIF_VALUE: 24
PIF_VALUE: 19
PIF_VALUE: 18
PIF_VALUE: 21
PIF_VALUE: 0
PIF_VALUE: 17
PIF_VALUE: 3
PIF_VALUE: 21
PIF_VALUE: 19
PIF_VALUE: 23
PIF_VALUE: 24
PIF_VALUE: 24
PIF_VALUE: 19
PIF_VALUE: 25
PIF_VALUE: 21
PIF_VALUE: 19
PIF_VALUE: 19
PIF_VALUE: 21
PIF_VALUE: 23
PIF_VALUE: 19
PIF_VALUE: 24
PIF_VALUE: 24
PIF_VALUE: 18
PIF_VALUE: 21
PIF_VALUE: 23
PIF_VALUE: 21
PIF_VALUE: 19
PIF_VALUE: 17
PIF_VALUE: 3
PIF_VALUE: 19
PIF_VALUE: 20
PIF_VALUE: 22
PIF_VALUE: 25
PIF_VALUE: 17
PIF_VALUE: 19
PIF_VALUE: 22
PIF_VALUE: 24
PIF_VALUE: 17
PIF_VALUE: 24
PIF_VALUE: 1
PIF_VALUE: 21
PIF_VALUE: 23
PIF_VALUE: 25
PIF_VALUE: 21
PIF_VALUE: 19
PIF_VALUE: 24
PIF_VALUE: 22
PIF_VALUE: 18
PIF_VALUE: 23
PIF_VALUE: 3
PIF_VALUE: 19
PIF_VALUE: 18
PIF_VALUE: 22
PIF_VALUE: 1
PIF_VALUE: 23
PIF_VALUE: 23
PIF_VALUE: 0
PIF_VALUE: 3
PIF_VALUE: 25
PIF_VALUE: 19
PIF_VALUE: 19
PIF_VALUE: 17
PIF_VALUE: 22
PIF_VALUE: 25
PIF_VALUE: 22

## 2021-12-09 ASSESSMENT — PAIN SCALES - GENERAL
PAINLEVEL_OUTOF10: 7
PAINLEVEL_OUTOF10: 8
PAINLEVEL_OUTOF10: 5
PAINLEVEL_OUTOF10: 6
PAINLEVEL_OUTOF10: 8
PAINLEVEL_OUTOF10: 8

## 2021-12-09 ASSESSMENT — PAIN DESCRIPTION - DESCRIPTORS
DESCRIPTORS: PRESSURE

## 2021-12-09 ASSESSMENT — PAIN DESCRIPTION - LOCATION
LOCATION: ABDOMEN

## 2021-12-09 ASSESSMENT — PAIN DESCRIPTION - ORIENTATION
ORIENTATION: LOWER
ORIENTATION: LOWER

## 2021-12-09 NOTE — H&P
Interval H&P Note    Pt Name: Sharon Rowell  MRN: [id number]  YOB: 1955  Date of evaluation: 12/13/2021      [x] I have reviewed in [facility] the Infectious Disease Progress Note by Dr Becky Mcmanus dated 11/17/21 attached below for an Interval History and Physical note. [x] I have examined  Sharon Rowell  There are no changes to the patient who is scheduled for a sigmoid bowel resection with possible colostomy by Dr Tawnya Stinson for sigmoid diverticulitis. The patient followed the bowel prep She is anxious and tearful today. She continues to have abdominal pain but denies new health changes, fever, chills, wheezing, cough, increased SOB, chest pain, open sores or wounds. PMH, Surgical History, Social History, Psych, and Family History reviewed and updated in EPIC in appropriate section. Allergies:  Sulfa antibiotics    Medications:    Prior to Admission medications    Medication Sig Start Date End Date Taking? Authorizing Provider   metFORMIN (GLUCOPHAGE-XR) 500 MG extended release tablet Take 500 mg by mouth 2 times daily (with meals)  10/7/21  Yes Historical Provider, MD   omeprazole (PRILOSEC) 40 MG capsule Take 40 mg by mouth daily as needed    Yes Historical Provider, MD   Multiple Vitamins-Minerals (THERAPEUTIC MULTIVITAMIN-MINERALS) tablet Take 1 tablet by mouth daily   Yes Historical Provider, MD   rosuvastatin (CRESTOR) 5 MG tablet Take 5 mg by mouth nightly    Historical Provider, MD   albuterol sulfate  (90 BASE) MCG/ACT inhaler Inhale 2 puffs into the lungs every 6 hours as needed for Wheezing    Historical Provider, MD         Physical Exam:   /67   Pulse 108   Temp 96.6 °F (35.9 °C) (Temporal)   Resp 16   Ht 5' 8\" (1.727 m)   Wt 190 lb 14.4 oz (86.6 kg)   SpO2 97%   BMI 29.03 kg/m²   No LMP recorded. Patient is postmenopausal.  No obstetric history on file. No results for input(s): POCGLU in the last 72 hours.     General Appearance:  Anxious tearful alert, well appearing, and in no bleeding          Recurrent diverticulitis with intra-abdominal abscess  Developing psoas abscess. Urinary tract infection     Recommendations: The patient is currently on Zosyn and clinically is feeling better. The patient was previously on oral antimicrobial therapy with Levaquin and metronidazole when she was not tolerating taking these well at all. We discussed options for discharge and the patient is willing to do IV antimicrobial therapy which I think would be best especially if she is having a difficult time tolerating the oral antibiotics  The plan will be for a midline placement and discharge on Invanz 1 g IV daily        Recommendations        I have ordered the following medications/ labs:        Orders Placed This Encounter   Procedures    CT ABDOMEN PELVIS W IV CONTRAST Additional Contrast? Oral       Standing Status:   Future       Standing Expiration Date:   2/17/2022       Order Specific Question:   Additional Contrast?       Answer:   Oral       Order Specific Question:   Reason for exam:       Answer:   f/u abscess        Encounter Medications    No orders of the defined types were placed in this encounter. Chief complaint/reason for consultation:           Chief Complaint   Patient presents with    Vascular Access Problem       pt. stated they werent sure who she was suppsoed to be instructed by to get midline out today medication ended on 11/12    Follow-Up from Hospital       diverticulitis          History of Present Illness:   Philippe Romero is a 73y.o.-year-old female who      Philippe Romero is a 73y.o.-year-old female who was initially admitted on 10/25/2021. Patient has no medical history of asthma, GERD, arthritis and diverticulitis on a few occasions over the past year. Patient is known to our practice from hospitalization earlier in October.  At that time, she was admitted due to CT scan of the abdomen that revealed left lower quadrant posterior abdominal abscess near the left psoas muscle related to a perforated diverticular abscess. She was seen by general surgery at that time and it was not felt that she needed any acute surgical intervention at that time. She was discharged 10/21/2021 on oral antibiotics. Yesterday patient returned to the emergency department with complaints of fatigue, fevers and chills. She had gone to her family [de-identified] office and she was felt to not look well so they instructed her to come to the emergency department. Urinalysis positive nitrates, small leuk esterase. WBC 13.      CT abd/pelvis          Impression:         1. Acute complicated diverticulitis involving the distal descending and   proximal sigmoid colon, with multiple fistulas between the inflamed areas of   bowel. A small intraperitoneal abscess is present within the left lower   quadrant, measuring 2.2 x 1.7 cm.   2. Enlargement of the left iliopsoas muscle, within the mid to lower pelvis,   and extending to the left groin region. Phlegmonous changes are present,   with a small abscess within the iliopsoas muscle anterior to the hip joint,   and within the enlarged left pectineus muscle   3. Inflamed diverticulum involving the distal transverse colon, without   evidence of free air or abscess formation in this area. Patient has been seen and evaluated by general surgery, she tells me that they do plan to do surgery once the inflammation has improved. Patient does tell me that she has been having some urinary discomfort and a sensation of not fully emptying her bladder. She has been initiated on Zosyn. We were consulted assist with antimicrobial therapy management. I have personally reviewedthe past medical history, medications, social history, and I have updated the database accordingly.   Past Medical History:      Past Medical History        Past Medical History:   Diagnosis Date    Arthritis       knee, back    Asthma      GERD (gastroesophageal reflux disease) Medications:      Current Facility-Administered Medications          Current Outpatient Medications   Medication Sig Dispense Refill    rosuvastatin (CRESTOR) 5 MG tablet Take 5 mg by mouth nightly        metFORMIN (GLUCOPHAGE-XR) 500 MG extended release tablet Take 500 mg by mouth 2 times daily (with meals)         omeprazole (PRILOSEC) 40 MG capsule Take 40 mg by mouth daily as needed         Multiple Vitamins-Minerals (THERAPEUTIC MULTIVITAMIN-MINERALS) tablet Take 1 tablet by mouth daily        albuterol sulfate  (90 BASE) MCG/ACT inhaler Inhale 2 puffs into the lungs every 6 hours as needed for Wheezing          No current facility-administered medications for this visit. Allergies:   Sulfa antibiotics      Review of Systems:   Review of Systems   Constitutional: Negative. Respiratory: Negative. Cardiovascular: Negative. Gastrointestinal: Negative. Genitourinary: Negative. Musculoskeletal: Negative. Skin: Negative. Neurological: Negative. Psychiatric/Behavioral: Negative. Physical Examination :   /71 (Site: Right Upper Arm, Position: Sitting, Cuff Size: Medium Adult)   Pulse 114   Temp 99.1 °F (37.3 °C) (Temporal)   Resp 18   Ht 5' 8\" (1.727 m)   Wt 192 lb (87.1 kg)   SpO2 92% Comment: room air at rest  BMI 29.19 kg/m²     Physical Exam  Constitutional:       Appearance: She is well-developed. HENT:      Head: Normocephalic and atraumatic. Cardiovascular:      Rate and Rhythm: Regular rhythm. Heart sounds: Normal heart sounds. Pulmonary:      Effort: Pulmonary effort is normal.      Breath sounds: Normal breath sounds. Abdominal:      General: Bowel sounds are normal.      Palpations: Abdomen is soft. Musculoskeletal:      Cervical back: Normal range of motion and neck supple. Skin:     General: Skin is warm and dry. Neurological:      Mental Status: She is alert and oriented to person, place, and time. Laboratory studies :  Medical Decision Making:   I have independently reviewed the following labs:  CBC with Differential:        Lab Results   Component Value Date     WBC 5.2 10/29/2021     WBC 8.3 10/28/2021     HGB 8.8 10/29/2021     HGB 8.7 10/28/2021     HCT 28.4 10/29/2021     HCT 28.6 10/28/2021      10/29/2021      10/28/2021     LYMPHOPCT 24 10/29/2021     LYMPHOPCT 16 10/28/2021     MONOPCT 10 10/29/2021     MONOPCT 9 10/28/2021         BMP:        Lab Results   Component Value Date      10/29/2021      10/28/2021     K 4.3 10/29/2021     K 4.0 10/28/2021      10/29/2021      10/28/2021     CO2 24 10/29/2021     CO2 25 10/28/2021     BUN 5 10/29/2021     BUN 6 10/28/2021     CREATININE 0.51 10/29/2021     CREATININE 0.51 10/28/2021         Hepatic Function Panel:         Lab Results   Component Value Date     PROT 6.9 10/25/2021     PROT 7.4 10/18/2021     LABALBU 2.9 10/25/2021     LABALBU 3.2 10/18/2021     BILIDIR <0.08 10/25/2021     IBILI CANNOT BE CALCULATED 10/25/2021     BILITOT 0.22 10/25/2021     BILITOT 0.18 10/18/2021     ALKPHOS 79 10/25/2021     ALKPHOS 110 10/18/2021     ALT 9 10/25/2021     ALT 20 10/18/2021     AST 12 10/25/2021     AST 25 10/18/2021               Lab Results   Component Value Date     CRP 69.5 10/19/2021            Lab Results   Component Value Date     SEDRATE 88 10/19/2021            Imaging Studies:        Cultures: Thank you for allowing us to participate in the care of this patient. Pleasecall with questions. Kelly Patterson MD  Perfect Serve messaging: [phone]     This note is created with the assistance of a speech recognition program.  While intending to generate a document that actually reflects the content ofthe visit, the document can still have some errors including those of syntax and sound a like substitutions which may escape proof reading.   It such instances, actual meaning can be extrapolated by contextual diversion. Office Visit on 11/17/2021          Office Visit on 11/17/2021              Detailed Report              Progress Notes Info    Author Note Status Last Update User Last Update Date/Time   Gurwinder Plata MD Sign when Signing Visit Gurwinder Plata MD 11/17/2021  9:47 AM     Chart Review Routing History    No routing history on file.

## 2021-12-09 NOTE — PROGRESS NOTES
Consult for Colostomy marking    [id number] [facility] nursing consulted for pre-operative stoma marking for planned possible ostomy. Met with patient in the preop area. Discussed plan for ostomy creation. Assessed abdomen for potential ostomy sites while supine, sitting, standing, and bending over. A site in the RUQ abdominal quadrant within the rectus abdominis was chosen( a secondary area in the LUQ was chosen but the patient states a history of hernia repair). This site avoids scars, wrinkles, creases, skin folds, varicosities, and is within patient's line of vision. Will not interfere with clothing. The patient is in agreement with the chosen site with the understanding that the ultimate site of the stoma will be based upon surgical findings and surgeon's preference. Site marked with permanent marker. Will continue to follow patient postoperatively.      Katelyn CEDENO RN, CWS, [facility] [place] [address]  Wound, Ostomy, and Continence Nursing  [phone]

## 2021-12-10 PROBLEM — E43 SEVERE MALNUTRITION (HCC): Status: ACTIVE | Noted: 2021-12-10

## 2021-12-10 LAB
ABSOLUTE EOS #: 0 K/UL (ref 0–0.4)
ABSOLUTE IMMATURE GRANULOCYTE: 0 K/UL (ref 0–0.3)
ABSOLUTE LYMPH #: 0.82 K/UL (ref 1–4.8)
ABSOLUTE MONO #: 0.25 K/UL (ref 0.2–0.8)
ANION GAP SERPL CALCULATED.3IONS-SCNC: 14 MMOL/L (ref 9–17)
BASOPHILS # BLD: 0 %
BASOPHILS ABSOLUTE: 0 K/UL (ref 0–0.2)
BUN BLDV-MCNC: 13 MG/DL (ref 8–23)
BUN/CREAT BLD: 25 (ref 9–20)
CALCIUM SERPL-MCNC: 8 MG/DL (ref 8.6–10.4)
CHLORIDE BLD-SCNC: 100 MMOL/L (ref 98–107)
CO2: 19 MMOL/L (ref 20–31)
CREAT SERPL-MCNC: 0.53 MG/DL (ref 0.5–0.9)
DIFFERENTIAL TYPE: ABNORMAL
EOSINOPHILS RELATIVE PERCENT: 0 % (ref 1–4)
GFR AFRICAN AMERICAN: >60 ML/MIN
GFR NON-AFRICAN AMERICAN: >60 ML/MIN
GFR SERPL CREATININE-BSD FRML MDRD: ABNORMAL ML/MIN/{1.73_M2}
GFR SERPL CREATININE-BSD FRML MDRD: ABNORMAL ML/MIN/{1.73_M2}
GLUCOSE BLD-MCNC: 265 MG/DL (ref 65–105)
GLUCOSE BLD-MCNC: 273 MG/DL (ref 65–105)
GLUCOSE BLD-MCNC: 273 MG/DL (ref 65–105)
GLUCOSE BLD-MCNC: 284 MG/DL (ref 65–105)
GLUCOSE BLD-MCNC: 302 MG/DL (ref 70–99)
HCT VFR BLD CALC: 31.4 % (ref 36.3–47.1)
HEMOGLOBIN: 9.8 G/DL (ref 11.9–15.1)
IMMATURE GRANULOCYTES: 0 %
LYMPHOCYTES # BLD: 10 % (ref 24–44)
MCH RBC QN AUTO: 26.3 PG (ref 25.2–33.5)
MCHC RBC AUTO-ENTMCNC: 31.2 G/DL (ref 28.4–34.8)
MCV RBC AUTO: 84.4 FL (ref 82.6–102.9)
MONOCYTES # BLD: 3 % (ref 1–7)
NRBC AUTOMATED: 0 PER 100 WBC
PDW BLD-RTO: 15.6 % (ref 11.8–14.4)
PLATELET # BLD: 432 K/UL (ref 138–453)
PLATELET ESTIMATE: ABNORMAL
PMV BLD AUTO: 9.1 FL (ref 8.1–13.5)
POTASSIUM SERPL-SCNC: 4.3 MMOL/L (ref 3.7–5.3)
RBC # BLD: 3.72 M/UL (ref 3.95–5.11)
RBC # BLD: ABNORMAL 10*6/UL
SEG NEUTROPHILS: 87 % (ref 36–66)
SEGMENTED NEUTROPHILS ABSOLUTE COUNT: 7.13 K/UL (ref 1.8–7.7)
SODIUM BLD-SCNC: 133 MMOL/L (ref 135–144)
WBC # BLD: 8.2 K/UL (ref 3.5–11.3)
WBC # BLD: ABNORMAL 10*3/UL

## 2021-12-10 PROCEDURE — 6360000002 HC RX W HCPCS: Performed by: STUDENT IN AN ORGANIZED HEALTH CARE EDUCATION/TRAINING PROGRAM

## 2021-12-10 PROCEDURE — 99213 OFFICE O/P EST LOW 20 MIN: CPT

## 2021-12-10 PROCEDURE — 85025 COMPLETE CBC W/AUTO DIFF WBC: CPT

## 2021-12-10 PROCEDURE — 6370000000 HC RX 637 (ALT 250 FOR IP): Performed by: INTERNAL MEDICINE

## 2021-12-10 PROCEDURE — 80048 BASIC METABOLIC PNL TOTAL CA: CPT

## 2021-12-10 PROCEDURE — 99222 1ST HOSP IP/OBS MODERATE 55: CPT | Performed by: INTERNAL MEDICINE

## 2021-12-10 PROCEDURE — C9113 INJ PANTOPRAZOLE SODIUM, VIA: HCPCS | Performed by: STUDENT IN AN ORGANIZED HEALTH CARE EDUCATION/TRAINING PROGRAM

## 2021-12-10 PROCEDURE — 6370000000 HC RX 637 (ALT 250 FOR IP): Performed by: FAMILY MEDICINE

## 2021-12-10 PROCEDURE — 6370000000 HC RX 637 (ALT 250 FOR IP): Performed by: STUDENT IN AN ORGANIZED HEALTH CARE EDUCATION/TRAINING PROGRAM

## 2021-12-10 PROCEDURE — 82947 ASSAY GLUCOSE BLOOD QUANT: CPT

## 2021-12-10 PROCEDURE — 36415 COLL VENOUS BLD VENIPUNCTURE: CPT

## 2021-12-10 PROCEDURE — 1200000000 HC SEMI PRIVATE

## 2021-12-10 PROCEDURE — 2500000003 HC RX 250 WO HCPCS: Performed by: STUDENT IN AN ORGANIZED HEALTH CARE EDUCATION/TRAINING PROGRAM

## 2021-12-10 RX ORDER — METRONIDAZOLE 500 MG/1
500 TABLET ORAL EVERY 8 HOURS SCHEDULED
Status: DISCONTINUED | OUTPATIENT
Start: 2021-12-10 | End: 2021-12-13

## 2021-12-10 RX ORDER — CEFDINIR 300 MG/1
300 CAPSULE ORAL EVERY 12 HOURS SCHEDULED
Status: DISCONTINUED | OUTPATIENT
Start: 2021-12-10 | End: 2021-12-13

## 2021-12-10 RX ORDER — NICOTINE POLACRILEX 4 MG
15 LOZENGE BUCCAL PRN
Status: DISCONTINUED | OUTPATIENT
Start: 2021-12-10 | End: 2021-12-10 | Stop reason: SDUPTHER

## 2021-12-10 RX ORDER — DEXTROSE MONOHYDRATE 50 MG/ML
100 INJECTION, SOLUTION INTRAVENOUS PRN
Status: DISCONTINUED | OUTPATIENT
Start: 2021-12-10 | End: 2021-12-10 | Stop reason: SDUPTHER

## 2021-12-10 RX ORDER — DEXTROSE MONOHYDRATE 25 G/50ML
12.5 INJECTION, SOLUTION INTRAVENOUS PRN
Status: DISCONTINUED | OUTPATIENT
Start: 2021-12-10 | End: 2021-12-10 | Stop reason: SDUPTHER

## 2021-12-10 RX ORDER — DEXTROSE MONOHYDRATE 50 MG/ML
100 INJECTION, SOLUTION INTRAVENOUS PRN
Status: DISCONTINUED | OUTPATIENT
Start: 2021-12-10 | End: 2021-12-11 | Stop reason: SDUPTHER

## 2021-12-10 RX ORDER — ALBUTEROL SULFATE 90 UG/1
2 AEROSOL, METERED RESPIRATORY (INHALATION) EVERY 4 HOURS PRN
Status: DISCONTINUED | OUTPATIENT
Start: 2021-12-10 | End: 2021-12-14 | Stop reason: HOSPADM

## 2021-12-10 RX ORDER — NICOTINE POLACRILEX 4 MG
15 LOZENGE BUCCAL PRN
Status: DISCONTINUED | OUTPATIENT
Start: 2021-12-10 | End: 2021-12-11 | Stop reason: SDUPTHER

## 2021-12-10 RX ORDER — DEXTROSE MONOHYDRATE 25 G/50ML
12.5 INJECTION, SOLUTION INTRAVENOUS PRN
Status: DISCONTINUED | OUTPATIENT
Start: 2021-12-10 | End: 2021-12-11 | Stop reason: SDUPTHER

## 2021-12-10 RX ORDER — PANTOPRAZOLE SODIUM 40 MG/10ML
40 INJECTION, POWDER, LYOPHILIZED, FOR SOLUTION INTRAVENOUS DAILY
Status: DISCONTINUED | OUTPATIENT
Start: 2021-12-10 | End: 2021-12-14 | Stop reason: HOSPADM

## 2021-12-10 RX ADMIN — OXYCODONE 5 MG: 5 TABLET ORAL at 12:07

## 2021-12-10 RX ADMIN — INSULIN LISPRO 6 UNITS: 100 INJECTION, SOLUTION INTRAVENOUS; SUBCUTANEOUS at 18:21

## 2021-12-10 RX ADMIN — METRONIDAZOLE 500 MG: 500 TABLET ORAL at 21:51

## 2021-12-10 RX ADMIN — INSULIN LISPRO 3 UNITS: 100 INJECTION, SOLUTION INTRAVENOUS; SUBCUTANEOUS at 19:55

## 2021-12-10 RX ADMIN — ACETAMINOPHEN 1000 MG: 500 TABLET ORAL at 21:51

## 2021-12-10 RX ADMIN — METRONIDAZOLE 500 MG: 500 TABLET ORAL at 16:11

## 2021-12-10 RX ADMIN — ACETAMINOPHEN 1000 MG: 500 TABLET ORAL at 05:57

## 2021-12-10 RX ADMIN — OXYCODONE 5 MG: 5 TABLET ORAL at 04:10

## 2021-12-10 RX ADMIN — ENOXAPARIN SODIUM 40 MG: 100 INJECTION SUBCUTANEOUS at 07:51

## 2021-12-10 RX ADMIN — CEFDINIR 300 MG: 300 CAPSULE ORAL at 19:55

## 2021-12-10 RX ADMIN — ROSUVASTATIN CALCIUM 5 MG: 10 TABLET, FILM COATED ORAL at 19:54

## 2021-12-10 RX ADMIN — ACETAMINOPHEN 1000 MG: 500 TABLET ORAL at 14:52

## 2021-12-10 RX ADMIN — POTASSIUM CHLORIDE, DEXTROSE MONOHYDRATE AND SODIUM CHLORIDE: 150; 5; 450 INJECTION, SOLUTION INTRAVENOUS at 07:54

## 2021-12-10 RX ADMIN — PANTOPRAZOLE SODIUM 40 MG: 40 INJECTION, POWDER, FOR SOLUTION INTRAVENOUS at 07:51

## 2021-12-10 ASSESSMENT — PAIN DESCRIPTION - ORIENTATION
ORIENTATION: LOWER

## 2021-12-10 ASSESSMENT — PAIN DESCRIPTION - DESCRIPTORS
DESCRIPTORS: PRESSURE

## 2021-12-10 ASSESSMENT — PAIN SCALES - GENERAL
PAINLEVEL_OUTOF10: 5
PAINLEVEL_OUTOF10: 7
PAINLEVEL_OUTOF10: 3
PAINLEVEL_OUTOF10: 7
PAINLEVEL_OUTOF10: 6

## 2021-12-10 ASSESSMENT — PAIN DESCRIPTION - LOCATION
LOCATION: ABDOMEN

## 2021-12-10 ASSESSMENT — PAIN DESCRIPTION - FREQUENCY
FREQUENCY: CONTINUOUS

## 2021-12-10 ASSESSMENT — PAIN DESCRIPTION - PROGRESSION
CLINICAL_PROGRESSION: GRADUALLY IMPROVING

## 2021-12-10 ASSESSMENT — PAIN DESCRIPTION - PAIN TYPE
TYPE: SURGICAL PAIN;ACUTE PAIN
TYPE: SURGICAL PAIN;ACUTE PAIN
TYPE: SURGICAL PAIN

## 2021-12-10 ASSESSMENT — PAIN DESCRIPTION - ONSET
ONSET: ON-GOING

## 2021-12-10 NOTE — RT PROTOCOL NOTE
RT Inhaler-Nebulizer Bronchodilator Protocol Note    There is a bronchodilator order in the chart from a provider indicating to follow the RT Bronchodilator Protocol and there is an Initiate RT Inhaler-Nebulizer Bronchodilator Protocol order as well (see protocol at bottom of note). CXR Findings:  No results found. The findings from the last RT Protocol Assessment were as follows:   History Pulmonary Disease: None or smoker <15 pack years  Respiratory Pattern: Regular pattern and RR 12-20 bpm  Breath Sounds: Clear breath sounds  Cough: Strong, spontaneous, non-productive  Indication for Bronchodilator Therapy: On home bronchodilators  Bronchodilator Assessment Score: 0    Aerosolized bronchodilator medication orders have been revised according to the RT Inhaler-Nebulizer Bronchodilator Protocol below. Respiratory Therapist to perform RT Therapy Protocol Assessment initially then follow the protocol. Repeat RT Therapy Protocol Assessment PRN for score 0-3 or on second treatment, BID, and PRN for scores above 3. No Indications - adjust the frequency to every 6 hours PRN wheezing or bronchospasm, if no treatments needed after 48 hours then discontinue using Per Protocol order mode. If indication present, adjust the RT bronchodilator orders based on the Bronchodilator Assessment Score as indicated below. Use Inhaler orders unless patient has one or more of the following: on home nebulizer, not able to hold breath for 10 seconds, is not alert and oriented, cannot activate and use MDI correctly, or respiratory rate 25 breaths per minute or more, then use the equivalent nebulizer order(s) with same Frequency and PRN reasons based on the score. If a patient is on this medication at home then do not decrease Frequency below that used at home.     0-3 - enter or revise RT bronchodilator order(s) to equivalent RT Bronchodilator order with Frequency of every 4 hours PRN for wheezing or increased work of breathing using Per Protocol order mode. 4-6 - enter or revise RT Bronchodilator order(s) to two equivalent RT bronchodilator orders with one order with BID Frequency and one order with Frequency of every 4 hours PRN wheezing or increased work of breathing using Per Protocol order mode. 7-10 - enter or revise RT Bronchodilator order(s) to two equivalent RT bronchodilator orders with one order with TID Frequency and one order with Frequency of every 4 hours PRN wheezing or increased work of breathing using Per Protocol order mode. 11-13 - enter or revise RT Bronchodilator order(s) to one equivalent RT bronchodilator order with QID Frequency and an Albuterol order with Frequency of every 4 hours PRN wheezing or increased work of breathing using Per Protocol order mode. Greater than 13 - enter or revise RT Bronchodilator order(s) to one equivalent RT bronchodilator order with every 4 hours Frequency and an Albuterol order with Frequency of every 2 hours PRN wheezing or increased work of breathing using Per Protocol order mode. RT to enter RT Home Evaluation for COPD & MDI Assessment order using Per Protocol order mode.     Electronically signed by Tee Trotter RCP on 12/10/2021 at 2:20 AM

## 2021-12-10 NOTE — ANESTHESIA POSTPROCEDURE EVALUATION
Department of Anesthesiology  Postprocedure Note    Patient: Saravanan Lechuga  MRN: [id number]  YOB: 1955  Date of evaluation: 12/9/2021  Time:  8:51 PM     Procedure Summary     Date: 12/09/21 Room / Location: [address] [place] [address]    Anesthesia Start: 6585 Anesthesia Stop:     Procedure: BOWEL RESECTION SIGMOID (N/A ) Diagnosis: (DX  SIGMOID DIVERTICULITIS  (TCC))    Surgeons: Lucy Kang MD Responsible Provider: Juan Garcia MD    Anesthesia Type: general ASA Status: 2          Anesthesia Type: No value filed. Marshall Phase I: Marshall Score: 8    Marshall Phase II:      Last vitals: Reviewed and per EMR flowsheets.        Anesthesia Post Evaluation    Complications: no

## 2021-12-10 NOTE — ACP (ADVANCE CARE PLANNING)
Advance Care Planning     Advance Care Planning Activator (Inpatient)  Conversation Note      Date of ACP Conversation: 12/10/2021     Conversation Conducted with: Patient with Decision Making Capacity    ACP Activator: Leo Alegre RN    {When Decision Maker makes decisions on behalf of the incapacitated patient: Decision Maker is asked to consider and make decisions based on patient values, known preferences, or best interests. Health Care Decision Maker:     Current Designated Health Care Decision Maker:   Rachelle Stark ()  Phone: [phone]    Click here to complete Healthcare Decision Makers including section of the Healthcare Decision Maker Relationship (ie \"Primary\")      Care Preferences    Ventilation: \"If you were in your present state of health and suddenly became very ill and were unable to breathe on your own, what would your preference be about the use of a ventilator (breathing machine) if it were available to you? \"      Would the patient desire the use of ventilator (breathing machine)?: yes    \"If your health worsens and it becomes clear that your chance of recovery is unlikely, what would your preference be about the use of a ventilator (breathing machine) if it were available to you? \"     Would the patient desire the use of ventilator (breathing machine)?: No      Resuscitation  \"CPR works best to restart the heart when there is a sudden event, like a heart attack, in someone who is otherwise healthy. Unfortunately, CPR does not typically restart the heart for people who have serious health conditions or who are very sick. \"    \"In the event your heart stopped as a result of an underlying serious health condition, would you want attempts to be made to restart your heart (answer \"yes\" for attempt to resuscitate) or would you prefer a natural death (answer \"no\" for do not attempt to resuscitate)? \" yes       [] Yes   [x] No   Educated Patient / Decision Maker regarding differences between Advance Directives and portable DNR orders.     Length of ACP Conversation in minutes:  5    Conversation Outcomes:  [x] ACP discussion completed  [] Existing advance directive reviewed with patient; no changes to patient's previously recorded wishes  [] New Advance Directive completed  [] Portable Do Not Rescitate prepared for Provider review and signature  [] POLST/POST/MOLST/MOST prepared for Provider review and signature      Follow-up plan:    [] Schedule follow-up conversation to continue planning  [] Referred individual to Provider for additional questions/concerns   [] Advised patient/agent/surrogate to review completed ACP document and update if needed with changes in condition, patient preferences or care setting    [] This note routed to one or more involved healthcare providers

## 2021-12-10 NOTE — DISCHARGE INSTR - COC
Continuity of Care Form    Patient Name: Brittaney Le   :  1955  MRN:  [id number]    Admit date:  2021  Discharge date:  2021    Code Status Order: Full Code   Advance Directives:   [facility] Documentation       Date/Time Healthcare Directive Type of Healthcare Directive Copy in [address] Agent's Name Healthcare Agent's Phone Number    21 9107 No, patient does not have an advance directive for healthcare treatment -- -- -- -- --    21 1318 No, patient does not have an advance directive for healthcare treatment -- -- -- -- --            Admitting Physician:  Reva Bob MD  PCP: Nicolette Ding MD    Discharging Nurse: [facility] Unit/Room#: 2111/2111-01  Discharging Unit Phone Number: [phone]    Emergency Contact:   Extended Emergency Contact Information  Primary Emergency Contact: [address]  Phone: [phone]  Relation: Spouse  Secondary Emergency Contact: dean dill  Mobile Phone: [phone]  Relation: Brother/Sister    Past Surgical History:  Past Surgical History:   Procedure Laterality Date    COLONOSCOPY      HERNIA REPAIR      umbilical    HERNIA REPAIR  2016    incisional with mesh, robotic converted to open    TONSILLECTOMY         Immunization History:   Immunization History   Administered Date(s) Administered    Tdap (Boostrix, Adacel) 2011       Active Problems:  Patient Active Problem List   Diagnosis Code    Incisional hernia K43.2    Intra-abdominal abscess ([facility] [address] K65.1    Diverticulitis of intestine with abscess K57.80    Mild malnutrition ([facility] [address] E44.1    S/P colectomy Z90.49    Diverticular disease K57.90       Isolation/Infection:   Isolation            No Isolation          Patient Infection Status       None to display            Nurse Assessment:  Last Vital Signs: /60   Pulse 70   Temp 98.1 °F (36.7 °C) (Oral)   Resp 18   Ht 5' 8\" (1.727 m)   Wt 190 lb 14.4 oz (86.6 kg)   SpO2 96%   BMI 29.03 kg/m²     Last documented pain score (0-10 scale): Pain Level: 6  Last Weight:   Wt Readings from Last 1 Encounters:   12/09/21 190 lb 14.4 oz (86.6 kg)     Mental Status:  oriented and alert    IV Access:  - None    Nursing Mobility/ADLs:  Walking   Independent  Transfer  Independent  [address] [address] Delivery   Prefers pills cut in half with apple sauce     Wound Care Documentation and Therapy:     -Midline abdominal incision care: gently pack daily with plain gauze    Colostomy Discharge needs:    -Stoma education provided to patient and .   -Post hospital nursing should support ostomy independence. -*Enrolled in [facility] prior to hospital discharge (sample kit to arrive in pt home)  -The patient was provided with a small amt of samples, please order supplies ASAP  -Please set up with ostomy supply company prior to homecare discharge. Caity's colostomy is fairly flush with skin level and is in a skin fold, we should anticipate difficulties with pouching. Pt will need support. Ostomy size 30 x 35mm    How to place new pouch:  -Cut barrier to fit stoma with no more than 1/8\" of exposed skin.  -Place an Protective Seal or Barrier Ring immediately around the stoma. Use moldable strip paste to level out grooves on either side of stoma.   -After pouch is placed, use fingers to moosh the pouch into the seal/putty immediately around the stoma to create a seamless seal.  Maintenance:  -Empty the pouch when 1/3 to 1/2 full.  -Change the pouching system twice weekly and prn.  -Our goal is to keep the skin around the stoma intact and to have a dependable pouching system without leaks.  -The skin around the stoma should be intact and appear just like the skin on the opposite side of the abdomen.   -The stoma is expected to shrink in size as swelling reduces- up to 8 weeks post op. Measure stoma each time pouch is replaced during this timeframe. Supplies needing ordered ASAP:  Coloplast Sensura Theodore one piece convex ref # [id number]  Brava protective seal [id number]  Brava moldable strip paste # [id number]  Brava skin barrier wipes # [id number]    THE PATIENT IS TO F/U WITH [address] 1-2 WEEKS. SHE WILL CALL FOR APPT. Nazario Rosa MSN RN, CWS, [facility] and [facility] [place] [address]  Wound, Ostomy, and Continence Nursing  [phone]       Elimination:  Continence: Bowel: Colostomy     Bladder: Yes  Urinary Catheter: None   Colostomy/Ileostomy/Ileal Conduit: Yes  Colostomy LLQ-Stoma  Assessment: Flush, Moist, Red  Colostomy LLQ-Peristomal Assessment: Clean, Intact  Colostomy LLQ-Stool Color:  (None)    Date of Last BM: 12/14/2021    Intake/Output Summary (Last 24 hours) at 12/10/2021 0944  Last data filed at 12/10/2021 0418  Gross per 24 hour   Intake 1145 ml   Output 820 ml   Net 325 ml     I/O last 3 completed shifts: In: 4277 [P.O.:20; I.V.:1125]  Out: 36 [Urine:530; Drains:140; Blood:150]    Safety Concerns:     None    Impairments/Disabilities:      None    Nutrition Therapy:  Current Nutrition Therapy:   - Oral Diet:  General, low fiber       Routes of Feeding: Oral  Liquids: No Restrictions  Daily Fluid Restriction: no  Last Modified Barium Swallow with Video (Video Swallowing Test): not done    Treatments at the Time of Hospital Discharge:   Respiratory Treatments: ***  Oxygen Therapy:  is not on home oxygen therapy.   Ventilator:    - No ventilator support    Rehab Therapies: Weight Bearing Status/Restrictions: No weight bearing restirctions  Other Medical Equipment (for information only, NOT a DME order):  {EQUIPMENT:[id number]}  Other Treatments: Skilled nursing assessment  Med teaching and compliance  Colostomy care and teaching    Patient's personal belongings (please select all that are sent with patient):  Hearing Aides bilateral    RN SIGNATURE:  Electronically signed by Sana Johns RN on 12/14/21 at 1:31 PM EST    CASE MANAGEMENT/SOCIAL WORK SECTION    Inpatient Status Date: ***    Readmission Risk Assessment Score:  Readmission Risk              Risk of Unplanned Readmission:  12           Discharging to Facility/ Agency   Name: [facility]  Address:  Phone: [phone]  Fax: [phone]      / signature: Electronically signed by Garnette Boast, RN on 12/14/21 at 1:24 PM EST    PHYSICIAN SECTION    Prognosis: Good    Condition at Discharge: Stable    Rehab Potential (if transferring to Rehab):     Recommended Labs or Other Treatments After Discharge: ostomy care    Physician Certification: I certify the above information and transfer of Wolfgang Pereira  is necessary for the continuing treatment of the diagnosis listed and that she requires [address] for less 30 days.      Update Admission H&P: No change in H&P    PHYSICIAN SIGNATURE:  DEQUAN Richards/Electronically signed by Michi Burden RN on 12/14/21 at 4:09 PM EST

## 2021-12-10 NOTE — CARE COORDINATION
Case Management Initial Discharge Plan  Neil Goodrich,             Met with:spouse/SO to discuss discharge plans. Information verified: address, contacts, phone number, , insurance Yes  PCP: David English MD  Date of last visit:     Insurance Provider: Malachi Rodrigues, [facility]    Discharge Planning    Living Arrangements:  Spouse/Significant Other   Support Systems:  Spouse/Significant Other, Children    Home has 1 stories  2 stairs to climb to get into front door, 0 stairs to climb to reach second floor  Location of bedroom/bathroom in home  - main floor    Patient able to perform ADL's:Independent    Current Services (outpatient & in home) none  DME equipment: none  DME provider: N/A    Pharmacy: [facility]    Potential Assistance Needed:  Home Care    Patient agreeable to home care: Yes  Freedom of choice provided:  yes    Prior SNF/Rehab Placement and Facility: No  Agreeable to SNF/Rehab: No  Dola of choice provided: n/a   Evaluation: n/a    Expected Discharge date:  21  Patient expects to be discharged to: Follow Up Appointment: Best Day/ Time: Monday AM    Transportation provider: /brother  Transportation arrangements needed for discharge: No    Readmission Risk              Risk of Unplanned Readmission:  12             Does patient have a readmission risk score greater than 14?: Yes  If yes, follow-up appointment must be made within 7 days of discharge. Goal of Care:       Discharge Plan: Met with patient at bedside. Lives with , independent and drives. POD #1 bowel resection with colostomy per Dr. Trujillo Lines for diverticulitis. Had been following with ID for recent intra-abdominal abscess and had been on IV Invanz through 21. Pt agreeable to [facility] for ostomy teaching and care. Referral called to [facility] and they can accept pt at D/C.  DECLAN initiated.     The Plan for Transition of Care is related to the following treatment goals: SN for ostomy care and teaching    The Patient was provided with a choice of provider and agrees   with the discharge plan. [x] Yes [] No    Freedom of choice list was provided with basic dialogue that supports the patient's individualized plan of care/goals, treatment preferences and shares the quality data associated with the providers.  [x] Yes [] No                     Electronically signed by Haroldo Pendleton RN on 12/10/21 at 9:27 AM EST

## 2021-12-10 NOTE — PROGRESS NOTES
Patient sat up bedside for a few minutes and became dizzy. We will try again a little later to ambulate.

## 2021-12-10 NOTE — CONSULTS
[facility]  Initial Consult Note  Date: 12/10/2021    Hospital day :1     Impression:   1. Diverticulitis of the large intestine with associated abscess status post resection of the sigmoid colon with colostomy formation 12/9/2021  2. Recent CT scan still had significant inflammatory changes in the descending colon, sigmoid colon, and rectosigmoid colon with air-fluid levels in the retroperitoneal space with areas of involvement including left psoas muscle, left iliac is muscle    Recommendations   · Given that the patient had significant findings on CT scan as well as intraoperative inflammatory changes the patient would benefit from continued antimicrobial therapy due to the pre-existing infection. · I did discuss antibiotic therapy with the patient who is willing to try oral antimicrobial therapy. · The patient will be started on Omnicef and metronidazole orally and see if she can tolerate this. · If she does do well my recommendation will be for 10 to 14-day course of therapy    Chief complaint/reason for consultation:   Diverticulitis with associated abscesses    History of Present Illness:   Magdalene Martin is a 73y.o.-year-old female who was initially admitted on 12/9/2021. Rylee Ly has a known history of diverticulitis with left lower quadrant abdominal abscess, left psoas muscle inflammation/abscess and was treated with intravenous antimicrobial therapy with Zosyn while hospitalized and subsequently switched to ertapenem which she finished. The patient was seen in the office on November 17, 2021 and at that point in time had a CT scan of the abdomen pelvis ordered which was done that continue to show residual intra-abdominal abscess as well as psoas muscle and iliacus muscle involvement. The patient was already scheduled for surgery and was admitted and underwent bowel resection of the sigmoid colon with descending colon colostomy formation on 12/9/2021.   The patient was found to have a large inflammatory process noted in the left lower quadrant abdomen with severe diverticular disease involving the sigmoid colon and no large amount of purulence was noted. Dissection into the retroperitoneal area was hampered by significant vasculature but no obvious abscesses were noted. The patient is postoperative day #1 today and is overall feeling better. She does have some generalized malaise/fatigue but does not report any subjective fevers or chills. I was asked to evaluate and help with management. I have personally reviewed the past medical history, past surgical history, medications, social history, and family history, and I have updated the database accordingly.   Past Medical History:     Past Medical History:   Diagnosis Date    Arthritis     knee, back    Asthma     Diverticulitis     GERD (gastroesophageal reflux disease)      Past Surgical  History:     Past Surgical History:   Procedure Laterality Date    COLONOSCOPY      HERNIA REPAIR      umbilical    HERNIA REPAIR  07/19/2016    incisional with mesh, robotic converted to open    SIGMOID COLECTOMY N/A 12/9/2021    BOWEL RESECTION SIGMOID performed by Frank Garcia MD at [address] [place] [address]       Medications:      pantoprazole  40 mg IntraVENous Daily    metroNIDAZOLE  500 mg IntraVENous Once    rosuvastatin  5 mg Oral Nightly    sodium chloride flush  5-40 mL IntraVENous 2 times per day    enoxaparin  40 mg SubCUTAneous Daily    acetaminophen  1,000 mg Oral 3 times per day     Social History:     Social History     Socioeconomic History    Marital status:      Spouse name: Not on file    Number of children: Not on file    Years of education: Not on file    Highest education level: Not on file   Occupational History    Not on file   Tobacco Use    Smoking status: Former Smoker     Packs/day: 0.25     Years: 3.00     Pack years: 0.75     Types: Cigarettes    Smokeless tobacco: Never Used    Tobacco comment: quit smoking at age 22   Vaping Use    Vaping Use: Never used   Substance and Sexual Activity    Alcohol use: Yes     Comment: occassionally    Drug use: No    Sexual activity: Not on file   Other Topics Concern    Not on file   Social History Narrative    Not on file     Social Determinants of Health     Financial Resource Strain:     Difficulty of Paying Living Expenses: Not on file   Food Insecurity:     Worried About Running Out of Food in the Last Year: Not on file    Nadir of Food in the Last Year: Not on file   Transportation Needs:     Lack of Transportation (Medical): Not on file    Lack of Transportation (Non-Medical): Not on file   Physical Activity:     Days of Exercise per Week: Not on file    Minutes of Exercise per Session: Not on file   Stress:     Feeling of Stress : Not on file   Social Connections:     Frequency of Communication with Friends and Family: Not on file    Frequency of Social Gatherings with Friends and Family: Not on file    Attends Faith Services: Not on file    Active Member of [address] [place] [address] or Organizations: Not on file    Attends Club or Organization Meetings: Not on file    Marital Status: Not on file   Intimate Partner Violence:     Fear of Current or Ex-Partner: Not on file    Emotionally Abused: Not on file    Physically Abused: Not on file    Sexually Abused: Not on file   Housing Stability:     Unable to Pay for Housing in the Last Year: Not on file    Number of [place] in the Last Year: Not on file    Unstable Housing in the Last Year: Not on file     Family History:   History reviewed. No pertinent family history. Allergies:   Sulfa antibiotics     Review of Systems:   General: No subjective fevers or chills but does have some generalized malaise/fatigue  Eyes: No double vision or blurry vision. ENT: No sore throat or runny nose. Cardiovascular: No chest pain or palpitations. Lung: No shortness of breath or cough.   Abdomen: No nausea vomiting but does have some abdominal pain/discomfort  Genitourinary: No increased urinary frequency, or dysuria. Musculoskeletal: No muscle aches or pains. Hematologic: No bleeding or bruising. Neurologic: No headache, weakness, numbness, or tingling. Physical Examination :   BP (!) 94/58   Pulse 72   Temp 98.9 °F (37.2 °C) (Oral)   Resp 16   Ht 5' 8\" (1.727 m)   Wt 190 lb 14.4 oz (86.6 kg)   SpO2 96%   BMI 29.03 kg/m²     Temperature Range: Temp: 98.9 °F (37.2 °C) Temp  Av.8 °F (36.6 °C)  Min: 86 °F (30 °C)  Max: 98.9 °F (37.2 °C)  General Appearance: Awake, alert, and in no apparent distress  Head: Normocephalic, without obvious abnormality, atraumatic  Eyes: Pupils equal, round, reactive, to light and accommodation; extraocular movements intact; sclera anicteric; conjunctivae pink  ENT: Oropharynx clear, without erythema, exudate, or thrush. Neck: Supple, without lymphadenopathy. Pulmonary/Chest: Clear to auscultation, without wheezes, rales, or rhonchi  Cardiovascular: Regular rate and rhythm without murmurs, rubs, or gallops. Abdomen: Midline abdomen ostomy in place and some generalized tenderness to palpation, nondistended. Extremities: No cyanosis, clubbing, edema, or effusions. Neurologic: No gross sensory or motor deficits. Skin: Warm and dry with no rash. Medical Decision Making:   I have independently reviewed/ordered the following labs:  CBC with Differential:   Recent Labs     21  1502 12/10/21  0628   WBC 12.1* 8.2   HGB 10.7* 9.8*   HCT 33.9* 31.4*    432   LYMPHOPCT 15* 10*   MONOPCT 10 3     BMP:   Recent Labs     21  1300 12/10/21  0628   * 133*   K 3.5* 4.3   CL 95* 100   CO2 21 19*   BUN 14 13   CREATININE 0.65 0.53     Hepatic Function Panel: No results for input(s): PROT, LABALBU, BILIDIR, IBILI, BILITOT, ALKPHOS, ALT, AST in the last 72 hours.     Lab Results   Component Value Date    PROCAL 0.05 10/19/2021       Lab Results Component Value Date    CRP 69.5 10/19/2021     No results found for: FERRITIN  No results found for: FIBRINOGEN  No results found for: DDIMER  No results found for: LDH    Lab Results   Component Value Date    SEDRATE 88 (H) 10/19/2021       No results found for: COVID19  No results found for requested labs within last 30 days. Imaging Studies:   No results found. Cultures:   NONE      Thank you for allowing us to participate in the care of this patient. Please call with questions. Electronically signed by Blossom Alvarado MD on 12/10/2021 at 12:30 PM      [facility]  Blossom Alvarado MD  [employer]  OFFICE: [phone]      This note is created with the assistance of a speech recognition program.  While intending to generate a document that actually reflects the content of the visit, the document can still have some errors including those of syntax and sound a like substitutions which may escape proof reading. In such instances, actual meaning can be extrapolated by contextual diversion.

## 2021-12-10 NOTE — PROGRESS NOTES
Comprehensive Nutrition Assessment    Type and Reason for Visit:  Positive Nutrition Screen (Unplanned weight loss)    Nutrition Recommendations/Plan:   1. Continue Clear liquid diet  2. Start Ensure Clear 3x/day  3. Monitor p.o intakes, diet advancement, diet tolerance and labs    Nutrition Assessment:  Patient admission is related to colectomy and diverticular disease. Patient is status post bowel resection sigmoid with descending colon colostomy on 12/9. Patient reports large amount of weight loss related to issues with diverticulitis and poor appetite. Patient reports a 70 lbs weight loss in 10 months. Patient is severely malnourished. Continue Clear liquid diet and advance diet when medically appropriate. Start Ensure Clear 3x/day. Monitor p.o intakes, diet tolerance and labs. Malnutrition Assessment:  Malnutrition Status:  Severe malnutrition    Context:  Chronic Illness     Findings of the 6 clinical characteristics of malnutrition:  Energy Intake:  7 - 75% or less estimated energy requirements for 1 month or longer  Weight Loss:  7 - Greater than 10% over 6 months     Body Fat Loss:  Unable to assess     Muscle Mass Loss:  Unable to assess    Fluid Accumulation:  No significant fluid accumulation Extremities   Strength:  Not Performed    Estimated Daily Nutrient Needs:  Energy (kcal):  7240-4929 kcal (22-25 kcal/kg); Weight Used for Energy Requirements:  Current     Protein (g):   gm of protein (1.3-1.6 gm/kg); Weight Used for Protein Requirements:  Ideal          Nutrition Related Findings:  Trace edema BLE. Hypoactive bowel sounds. S/P Bowel resection sigmoid, with descending colon colostomy (12/9)      Wounds:          Current Nutrition Therapies:    ADULT DIET;  Clear Liquid  ADULT ORAL NUTRITION SUPPLEMENT; Breakfast, Dinner, Lunch; Clear Liquid Oral Supplement    Anthropometric Measures:  · Height: 5' 8\" (172.7 cm)  · Current Body Weight: 190 lb (86.2 kg)    · Ideal Body Weight: 140 lbs; % Ideal Body Weight 135.7 %   · BMI: 28.9  · BMI Categories: Overweight (BMI 25.0-29. 9)       Nutrition Diagnosis:   · Severe malnutrition related to inadequate protein-energy intake as evidenced by intake 0-25%, weight loss greater than or equal to 10% in 6 months      Nutrition Interventions:   Food and/or Nutrient Delivery:  Continue Current Diet, Continue Oral Nutrition Supplement  Nutrition Education/Counseling:  Education not indicated   Coordination of Nutrition Care:  Continue to monitor while inpatient    Goals: P.O intakes are greater than 75% at meals       Nutrition Monitoring and Evaluation:   Food/Nutrient Intake Outcomes:  Food and Nutrient Intake, Supplement Intake, Diet Advancement/Tolerance  Physical Signs/Symptoms Outcomes:  Biochemical Data, Fluid Status or Edema, Skin, Weight     Discharge Planning:     Too soon to determine           Nadia WILLISN, RDN, LDN  Lead Clinical Dietitian  RD Office Phone [phone]

## 2021-12-10 NOTE — CONSULTS
[facility]  Ostomy Consult Note     NAME:  Brent Young  MEDICAL RECORD NUMBER:  [id number]  AGE: 73 y.o. GENDER:  female  :  1955  TODAY'S DATE:  12/10/2021    Subjective     Brent Young is a 73 y.o. female referred by:   [x] Physician  [] Nursing  [] Other:     Established    Surgeon Dr. Meriam Scheuermann, new end descending colostomy 12/10/21      PAST MEDICAL HISTORY:        Diagnosis Date    Arthritis     knee, back    Asthma     Diverticulitis     GERD (gastroesophageal reflux disease)        MEDICATIONS:    No current facility-administered medications on file prior to encounter. Current Outpatient Medications on File Prior to Encounter   Medication Sig Dispense Refill    metFORMIN (GLUCOPHAGE-XR) 500 MG extended release tablet Take 500 mg by mouth 2 times daily (with meals)       omeprazole (PRILOSEC) 40 MG capsule Take 40 mg by mouth daily as needed       Multiple Vitamins-Minerals (THERAPEUTIC MULTIVITAMIN-MINERALS) tablet Take 1 tablet by mouth daily      rosuvastatin (CRESTOR) 5 MG tablet Take 5 mg by mouth nightly      albuterol sulfate  (90 BASE) MCG/ACT inhaler Inhale 2 puffs into the lungs every 6 hours as needed for Wheezing         ALLERGIES:    Allergies   Allergen Reactions    Sulfa Antibiotics Other (See Comments)     Face got red       PAST SURGICAL HISTORY:    Past Surgical History:   Procedure Laterality Date    COLONOSCOPY      HERNIA REPAIR      umbilical    HERNIA REPAIR  2016    incisional with mesh, robotic converted to open    SIGMOID COLECTOMY N/A 2021    BOWEL RESECTION SIGMOID performed by Imani Davila MD at [address]:    family history is not on file.     SOCIAL HISTORY:    Social History     Tobacco Use    Smoking status: Former Smoker     Packs/day: 0.25     Years: 3.00     Pack years: 0.75     Types: Cigarettes    Smokeless tobacco: Never Used    Tobacco comment: quit smoking at age 22 Vaping Use    Vaping Use: Never used   Substance Use Topics    Alcohol use: Yes     Comment: occassionally    Drug use: No       LABS:  WBC:    Lab Results   Component Value Date    WBC 8.2 12/10/2021     H/H:    Lab Results   Component Value Date    HGB 9.8 12/10/2021    HCT 31.4 12/10/2021     BMP:    Lab Results   Component Value Date     12/10/2021    K 4.3 12/10/2021     12/10/2021    CO2 19 12/10/2021    BUN 13 12/10/2021    LABALBU 2.9 10/25/2021    CREATININE 0.53 12/10/2021    CALCIUM 8.0 12/10/2021    GFRAA >60 12/10/2021    LABGLOM >60 12/10/2021    GLUCOSE 302 12/10/2021     PTT:  No results found for: APTT, PTT[APTT}  PT/INR:    Lab Results   Component Value Date    PROTIME 16.3 12/09/2021    INR 1.3 12/09/2021       Objective    /60   Pulse 70   Temp 98.1 °F (36.7 °C) (Oral)   Resp 18   Ht 5' 8\" (1.727 m)   Wt 190 lb 14.4 oz (86.6 kg)   SpO2 96%   BMI 29.03 kg/m²     Jordin Risk Score Jordin Scale Score: 20    Patient Active Problem List   Diagnosis Code    Incisional hernia K43.2    Intra-abdominal abscess (HCC) K65.1    Diverticulitis of intestine with abscess K57.80    Mild malnutrition (HCC) E44.1    S/P colectomy Z90.49    Diverticular disease K57.90       Assessment           Colostomy LLQ (Active)   Stoma  Assessment Flush; Moist; Red 12/10/21 0730   Peristomal Assessment Clean; Intact 12/10/21 0730   Number of days: 1        Colostomy pouch in place, serosang only in pouch at this time. Tape used to cover the vent to monitor for flatus. Discussed ostomy management briefly. Support and encouragement extended to the patient and her  at the bedside. She appears to be in better spirits today. Educational information provided to the patient and a plan was made for further ostomy care and education on Sunday 12/12/21.        Intake/Output Summary (Last 24 hours) at 12/10/2021 1114  Last data filed at 12/10/2021 0418  Gross per 24 hour   Intake 1145 ml

## 2021-12-10 NOTE — PROGRESS NOTES
General Surgery:  Daily Progress Note             PATIENT NAME: Fazal Swenson     TODAY'S DATE: 12/10/2021, 5:03 AM    SUBJECTIVE:     Pt seen and examined at bedside. Afebrile. VSS. Patient pain under control. UOP adequate. DELIO with 140cc serosanguineous output. No flatus or ostomy output. OBJECTIVE:   VITALS:  /67   Pulse 74   Temp 97.3 °F (36.3 °C) (Oral)   Resp 18   Ht 5' 8\" (1.727 m)   Wt 190 lb 14.4 oz (86.6 kg)   SpO2 97%   BMI 29.03 kg/m²      INTAKE/OUTPUT:      Intake/Output Summary (Last 24 hours) at 12/10/2021 0503  Last data filed at 12/10/2021 0418  Gross per 24 hour   Intake 1145 ml   Output 820 ml   Net 325 ml       PHYSICAL EXAM:  General Appearance: awake, alert, oriented, in no acute distress  HEENT:  Normocephalic, atraumatic, mucus membranes moist   Heart: Heart regular rate and rhythm  Lungs: Unlabored  Abdomen: Soft, appropriately ttp, midline wound with packing in place, DELIO with serosanguineous output, LLQ ostomy pink and patent, bowel sweat in appliance   Extremities: No cyanosis, pitting edema, rashes noted. Skin: Skin color, texture, turgor normal. No rashes or lesions.     Data:  CBC with Differential:    Lab Results   Component Value Date    WBC 12.1 12/09/2021    RBC 4.04 12/09/2021    HGB 10.7 12/09/2021    HCT 33.9 12/09/2021     12/09/2021    MCV 83.9 12/09/2021    MCH 26.5 12/09/2021    MCHC 31.6 12/09/2021    RDW 15.7 12/09/2021    LYMPHOPCT 15 12/09/2021    MONOPCT 10 12/09/2021    BASOPCT 0 12/09/2021    MONOSABS 1.19 12/09/2021    LYMPHSABS 1.83 12/09/2021    EOSABS 0.04 12/09/2021    BASOSABS 0.04 12/09/2021    DIFFTYPE NOT REPORTED 12/09/2021     BMP:    Lab Results   Component Value Date     12/09/2021    K 3.5 12/09/2021    CL 95 12/09/2021    CO2 21 12/09/2021    BUN 14 12/09/2021    LABALBU 2.9 10/25/2021    CREATININE 0.65 12/09/2021    CALCIUM 9.3 12/09/2021    GFRAA >60 12/09/2021    LABGLOM >60 12/09/2021    GLUCOSE 137 12/09/2021 ASSESSMENT:  Active Hospital Problems    Diagnosis Date Noted    S/P colectomy [Z90.49] 12/09/2021    Diverticular disease [K57.90] 12/09/2021       73 y.o. female with chronic diverticulitis  - POD#1 s/p Manda's procedure  - Pathology:  pending    Plan:  1. Diet: CLD  2. Analgesia:  Tylenol, roxicodone, prn morphine  3. GI prophylaxis: protonix  4. DVT prophylaxis:  lovenox  5. Activity:  Continue to encourage ambulation/activity w/ PT/OT  6. Continue to encourage incentive spirometry  7. Dry dressing to midline, packing to be changed POD#2  8. Follow up AM imaging  9. Anticipate blackmon removal this morning  10. Follow up ID recommendations   11. Wound ostomy eval and treat for new descending colostomy    Electronically signed by Aurora Avendaño MD  on 12/10/2021 at 5:03 AM     General Surgery Attending Attestation Note    Patient was seen and examined. I agree with the above resident's exam, assessment and plan.      Doing ok   Up out of bed  Ambulate  IS      Jennifer Stoll, [address], [place] [facility]  Office: [phone]  After Hours: Please call answering service

## 2021-12-10 NOTE — OP NOTE
Operative Note      Patient: Tom Hadley  YOB: 1955  MRN: [id number]    Date of Procedure: 12/9/2021    Pre-Op Diagnosis: DX  SIGMOID DIVERTICULITIS  (TCC)    Post-Op Diagnosis: Same       Procedure(s):  BOWEL RESECTION SIGMOID, with descending colon colostomy    Surgeon(s): Blade Correa MD    Assistant:   Resident: Elle Dow MD    Anesthesia: General    Estimated Blood Loss (mL): 085DQ    Complications: None    Specimens:   ID Type Source Tests Collected by Time Destination   A : SIGMOID COLON Tissue Sigmoid Colon SURGICAL PATHOLOGY Blade Correa MD 12/9/2021 1852        Implants:  * No implants in log *      Drains:   Closed/Suction Drain RLQ Bulb 10 Serbian (Active)       Colostomy LLQ (Active)       Urethral Catheter Double-lumen; Non-latex 16 fr (Active)       Findings: Severe diverticular disease involving the sigmoid colon    Detailed Description of Procedure:   Patient was brought to the operating room and was placed in the supine position. After induction of general endotracheal anesthesia, patient was put in the lithotomy position. After prepping and draping the abdomen, a midline incision was made starting from pubic symphysis to the umbilicus. Incision was taken of the fascia and the fascia was opened full length. On entering the abdominal cavity, a wound protector was placed. Patient was noted to have a mesh just above the umbilicus. A large inflammatory process was noted in the left lower abdomen. This process could be palpated in the left lower abdomen before the incision was made. Examination revealed severe diverticular disease involving the sigmoid colon. Carefully the colon was mobilized. No large amount of purulence was noted however. The colon was dissected distally towards the rectum. Proximally also the patient's distal descending colon showed significant inflammatory process. Carefully this was all mobilized.   In view of the severe disease, it was felt that it would be most appropriate to undertake a colostomy. We also tried to dissect in the retroperitoneum to see if there is any underlying abscess as that was suspected on her CT scan. However, dissection revealed significant vasculature which made our dissection and exposure of the area difficult however no large abscesses were identified. The distal side of resection was determined to be in the oximeter rectum. The bowel was transected by using a contour stapler. The distal rectal stump was marked with a 2-0 Prolene suture. Next LigaSure was used to go through the mesentery of the sigmoid colon working proximally. The descending colon was also mobilized by cutting through the white line of Toldt. The optimal site of resection was determined to be in the distal/mid descending colon. SAIRA as was used to transect the bowel. At this time the specimen was taken off the field and thorough irrigation of abdominal cavity was performed. A drain was placed in the pelvis and was brought out through right lower abdomen. The site of the stoma was determined to be in the left lower abdomen. 18 blade was used to fernando the skin and the incision was taken down to the fascia where a cruciate incision made in the fascia and the site was dilated. The descending colon was brought through this location with some tension. At this time after satisfactory irrigation of the abdominal cavity, #1 Prolene was used in a running fashion to close the fascia with few interrupted Prolene sutures. Is noted before the closure, we also put a sheet of Seprafilm in the midline. Subcutaneous tissue was irrigated with Betadine solution. Half percent Marcaine with epinephrine was injected. Staples were used to close the skin loosely and iodoform packing was placed. Midline incision site was covered. The stoma was next matured by cutting through the staple line.   3-0 Monocryl was used in interrupted fashion to approximate the mucosa and the serosa to the skin. The stoma appeared to be pink. Stoma appliance was applied. Patient was brought recovery room in stable condition.     Electronically signed by Blade Correa MD on 12/9/2021 at 7:52 PM

## 2021-12-11 LAB
ABSOLUTE EOS #: <0.03 K/UL (ref 0–0.44)
ABSOLUTE IMMATURE GRANULOCYTE: 0.02 K/UL (ref 0–0.3)
ABSOLUTE LYMPH #: 1.6 K/UL (ref 1.1–3.7)
ABSOLUTE MONO #: 0.64 K/UL (ref 0.1–1.2)
ANION GAP SERPL CALCULATED.3IONS-SCNC: 6 MMOL/L (ref 9–17)
BASOPHILS # BLD: 0 % (ref 0–2)
BASOPHILS ABSOLUTE: <0.03 K/UL (ref 0–0.2)
BUN BLDV-MCNC: 7 MG/DL (ref 8–23)
BUN/CREAT BLD: 14 (ref 9–20)
CALCIUM SERPL-MCNC: 8.1 MG/DL (ref 8.6–10.4)
CHLORIDE BLD-SCNC: 103 MMOL/L (ref 98–107)
CO2: 26 MMOL/L (ref 20–31)
CREAT SERPL-MCNC: 0.49 MG/DL (ref 0.5–0.9)
DIFFERENTIAL TYPE: ABNORMAL
EOSINOPHILS RELATIVE PERCENT: 0 % (ref 1–4)
GFR AFRICAN AMERICAN: >60 ML/MIN
GFR NON-AFRICAN AMERICAN: >60 ML/MIN
GFR SERPL CREATININE-BSD FRML MDRD: ABNORMAL ML/MIN/{1.73_M2}
GFR SERPL CREATININE-BSD FRML MDRD: ABNORMAL ML/MIN/{1.73_M2}
GLUCOSE BLD-MCNC: 136 MG/DL (ref 65–105)
GLUCOSE BLD-MCNC: 149 MG/DL (ref 65–105)
GLUCOSE BLD-MCNC: 191 MG/DL (ref 65–105)
GLUCOSE BLD-MCNC: 206 MG/DL (ref 65–105)
GLUCOSE BLD-MCNC: 209 MG/DL (ref 70–99)
HCT VFR BLD CALC: 30.6 % (ref 36.3–47.1)
HEMOGLOBIN: 9.4 G/DL (ref 11.9–15.1)
IMMATURE GRANULOCYTES: 0 %
LYMPHOCYTES # BLD: 20 % (ref 24–43)
MCH RBC QN AUTO: 26.6 PG (ref 25.2–33.5)
MCHC RBC AUTO-ENTMCNC: 30.7 G/DL (ref 28.4–34.8)
MCV RBC AUTO: 86.4 FL (ref 82.6–102.9)
MONOCYTES # BLD: 8 % (ref 3–12)
NRBC AUTOMATED: 0 PER 100 WBC
PDW BLD-RTO: 15.8 % (ref 11.8–14.4)
PLATELET # BLD: 448 K/UL (ref 138–453)
PLATELET ESTIMATE: ABNORMAL
PMV BLD AUTO: 9 FL (ref 8.1–13.5)
POTASSIUM SERPL-SCNC: 4.1 MMOL/L (ref 3.7–5.3)
RBC # BLD: 3.54 M/UL (ref 3.95–5.11)
RBC # BLD: ABNORMAL 10*6/UL
SEG NEUTROPHILS: 72 % (ref 36–65)
SEGMENTED NEUTROPHILS ABSOLUTE COUNT: 5.79 K/UL (ref 1.5–8.1)
SODIUM BLD-SCNC: 135 MMOL/L (ref 135–144)
WBC # BLD: 8.1 K/UL (ref 3.5–11.3)
WBC # BLD: ABNORMAL 10*3/UL

## 2021-12-11 PROCEDURE — 36415 COLL VENOUS BLD VENIPUNCTURE: CPT

## 2021-12-11 PROCEDURE — C9113 INJ PANTOPRAZOLE SODIUM, VIA: HCPCS | Performed by: STUDENT IN AN ORGANIZED HEALTH CARE EDUCATION/TRAINING PROGRAM

## 2021-12-11 PROCEDURE — 2580000003 HC RX 258: Performed by: STUDENT IN AN ORGANIZED HEALTH CARE EDUCATION/TRAINING PROGRAM

## 2021-12-11 PROCEDURE — 80048 BASIC METABOLIC PNL TOTAL CA: CPT

## 2021-12-11 PROCEDURE — 1200000000 HC SEMI PRIVATE

## 2021-12-11 PROCEDURE — 2500000003 HC RX 250 WO HCPCS: Performed by: STUDENT IN AN ORGANIZED HEALTH CARE EDUCATION/TRAINING PROGRAM

## 2021-12-11 PROCEDURE — 85025 COMPLETE CBC W/AUTO DIFF WBC: CPT

## 2021-12-11 PROCEDURE — 83036 HEMOGLOBIN GLYCOSYLATED A1C: CPT

## 2021-12-11 PROCEDURE — 99233 SBSQ HOSP IP/OBS HIGH 50: CPT | Performed by: INTERNAL MEDICINE

## 2021-12-11 PROCEDURE — 82947 ASSAY GLUCOSE BLOOD QUANT: CPT

## 2021-12-11 PROCEDURE — 6370000000 HC RX 637 (ALT 250 FOR IP): Performed by: STUDENT IN AN ORGANIZED HEALTH CARE EDUCATION/TRAINING PROGRAM

## 2021-12-11 PROCEDURE — 6370000000 HC RX 637 (ALT 250 FOR IP): Performed by: FAMILY MEDICINE

## 2021-12-11 PROCEDURE — 6370000000 HC RX 637 (ALT 250 FOR IP): Performed by: INTERNAL MEDICINE

## 2021-12-11 PROCEDURE — 6360000002 HC RX W HCPCS: Performed by: STUDENT IN AN ORGANIZED HEALTH CARE EDUCATION/TRAINING PROGRAM

## 2021-12-11 RX ORDER — DEXTROSE MONOHYDRATE 25 G/50ML
12.5 INJECTION, SOLUTION INTRAVENOUS PRN
Status: DISCONTINUED | OUTPATIENT
Start: 2021-12-11 | End: 2021-12-14 | Stop reason: HOSPADM

## 2021-12-11 RX ORDER — METFORMIN HYDROCHLORIDE 500 MG/1
500 TABLET, EXTENDED RELEASE ORAL 2 TIMES DAILY WITH MEALS
Status: DISCONTINUED | OUTPATIENT
Start: 2021-12-11 | End: 2021-12-14 | Stop reason: HOSPADM

## 2021-12-11 RX ORDER — DEXTROSE MONOHYDRATE 50 MG/ML
100 INJECTION, SOLUTION INTRAVENOUS PRN
Status: DISCONTINUED | OUTPATIENT
Start: 2021-12-11 | End: 2021-12-14 | Stop reason: HOSPADM

## 2021-12-11 RX ORDER — NICOTINE POLACRILEX 4 MG
15 LOZENGE BUCCAL PRN
Status: DISCONTINUED | OUTPATIENT
Start: 2021-12-11 | End: 2021-12-14 | Stop reason: HOSPADM

## 2021-12-11 RX ADMIN — INSULIN LISPRO 2 UNITS: 100 INJECTION, SOLUTION INTRAVENOUS; SUBCUTANEOUS at 08:31

## 2021-12-11 RX ADMIN — CEFDINIR 300 MG: 300 CAPSULE ORAL at 21:34

## 2021-12-11 RX ADMIN — SODIUM CHLORIDE, PRESERVATIVE FREE 10 ML: 5 INJECTION INTRAVENOUS at 08:27

## 2021-12-11 RX ADMIN — POTASSIUM CHLORIDE, DEXTROSE MONOHYDRATE AND SODIUM CHLORIDE: 150; 5; 450 INJECTION, SOLUTION INTRAVENOUS at 14:48

## 2021-12-11 RX ADMIN — METRONIDAZOLE 500 MG: 500 TABLET ORAL at 21:34

## 2021-12-11 RX ADMIN — ONDANSETRON 4 MG: 2 INJECTION INTRAMUSCULAR; INTRAVENOUS at 20:16

## 2021-12-11 RX ADMIN — PANTOPRAZOLE SODIUM 40 MG: 40 INJECTION, POWDER, FOR SOLUTION INTRAVENOUS at 08:27

## 2021-12-11 RX ADMIN — INSULIN LISPRO 4 UNITS: 100 INJECTION, SOLUTION INTRAVENOUS; SUBCUTANEOUS at 11:44

## 2021-12-11 RX ADMIN — INSULIN LISPRO 2 UNITS: 100 INJECTION, SOLUTION INTRAVENOUS; SUBCUTANEOUS at 16:33

## 2021-12-11 RX ADMIN — CEFDINIR 300 MG: 300 CAPSULE ORAL at 08:27

## 2021-12-11 RX ADMIN — ACETAMINOPHEN 1000 MG: 500 TABLET ORAL at 13:06

## 2021-12-11 RX ADMIN — ENOXAPARIN SODIUM 40 MG: 100 INJECTION SUBCUTANEOUS at 08:27

## 2021-12-11 RX ADMIN — METRONIDAZOLE 500 MG: 500 TABLET ORAL at 13:06

## 2021-12-11 RX ADMIN — ACETAMINOPHEN 1000 MG: 500 TABLET ORAL at 06:15

## 2021-12-11 RX ADMIN — OXYCODONE 5 MG: 5 TABLET ORAL at 21:33

## 2021-12-11 RX ADMIN — METRONIDAZOLE 500 MG: 500 TABLET ORAL at 06:15

## 2021-12-11 RX ADMIN — POTASSIUM CHLORIDE, DEXTROSE MONOHYDRATE AND SODIUM CHLORIDE: 150; 5; 450 INJECTION, SOLUTION INTRAVENOUS at 04:20

## 2021-12-11 ASSESSMENT — PAIN DESCRIPTION - LOCATION: LOCATION: ABDOMEN

## 2021-12-11 ASSESSMENT — PAIN SCALES - GENERAL
PAINLEVEL_OUTOF10: 5
PAINLEVEL_OUTOF10: 8
PAINLEVEL_OUTOF10: 9

## 2021-12-11 ASSESSMENT — PAIN DESCRIPTION - ORIENTATION: ORIENTATION: LOWER

## 2021-12-11 ASSESSMENT — PAIN DESCRIPTION - DESCRIPTORS: DESCRIPTORS: DISCOMFORT;PRESSURE

## 2021-12-11 ASSESSMENT — PAIN DESCRIPTION - PAIN TYPE: TYPE: SURGICAL PAIN

## 2021-12-11 NOTE — PROGRESS NOTES
Patient tolerating full liquid diet with no complaints of abdominal pain, nausea or vomiting. Will continue to monitor.

## 2021-12-11 NOTE — PROGRESS NOTES
General Surgery:  Daily Progress Note             PATIENT NAME: Dai Quintanilla     TODAY'S DATE: 12/11/2021, 6:49 AM    SUBJECTIVE:     Patient seen and chart reviewed. No acute events overnight. Ostomy appliance with gas and stool. DELIO drain serosanguineous. Denies fever or chills  Denies SOB or cough  Denies palpitations or CP  Denies abdominal pain, nausea, vomiting, or diarrhea    OBJECTIVE:   VITALS:  /65   Pulse 77   Temp 97.8 °F (36.6 °C) (Oral)   Resp 17   Ht 5' 8\" (1.727 m)   Wt 190 lb 14.4 oz (86.6 kg)   SpO2 97%   BMI 29.03 kg/m²      INTAKE/OUTPUT:      Intake/Output Summary (Last 24 hours) at 12/11/2021 4055  Last data filed at 12/11/2021 8975  Gross per 24 hour   Intake --   Output 2540 ml   Net -2540 ml       PHYSICAL EXAM:  General Appearance: awake, alert, oriented, in no acute distress  HEENT:  Normocephalic, atraumatic, mucus membranes moist   Heart: Heart regular rate and rhythm  Lungs: Unlabored  Abdomen: Soft, appropriately ttp, midline wound with packing in place, DELIO with serosanguineous output, LLQ ostomy pink and patent, bowel sweat in appliance   Extremities: No cyanosis, pitting edema, rashes noted. Skin: Skin color, texture, turgor normal. No rashes or lesions.     Data:  CBC with Differential:    Lab Results   Component Value Date    WBC 8.1 12/11/2021    RBC 3.54 12/11/2021    HGB 9.4 12/11/2021    HCT 30.6 12/11/2021     12/11/2021    MCV 86.4 12/11/2021    MCH 26.6 12/11/2021    MCHC 30.7 12/11/2021    RDW 15.8 12/11/2021    LYMPHOPCT 20 12/11/2021    MONOPCT 8 12/11/2021    BASOPCT 0 12/11/2021    MONOSABS 0.64 12/11/2021    LYMPHSABS 1.60 12/11/2021    EOSABS <0.03 12/11/2021    BASOSABS <0.03 12/11/2021    DIFFTYPE NOT REPORTED 12/11/2021     BMP:    Lab Results   Component Value Date     12/11/2021    K 4.1 12/11/2021     12/11/2021    CO2 26 12/11/2021    BUN 7 12/11/2021    LABALBU 2.9 10/25/2021    CREATININE 0.49 12/11/2021    CALCIUM 8.1 12/11/2021    GFRAA >60 12/11/2021    LABGLOM >60 12/11/2021    GLUCOSE 209 12/11/2021       ASSESSMENT:  Active Hospital Problems    Diagnosis Date Noted    Severe malnutrition ([facility] [address] [E43] 12/10/2021    S/P colectomy [Z90.49] 12/09/2021    Diverticular disease [K57.90] 12/09/2021       73 y.o. female with chronic diverticulitis  - POD#3 s/p Manda's procedure  - Pathology:  pending    Plan:  1. Diet: Advance to full liquid diet  2. Analgesia:  Tylenol, roxicodone, prn morphine  3. GI prophylaxis: protonix  4. DVT prophylaxis:  lovenox  5. Activity:  Continue to encourage ambulation/activity w/ PT/OT  6. Continue to encourage incentive spirometry  7. Packing changes to midline  8. D/C Santo today  9. ID consulted: Recommend continue cefdinir and flagyl  10. Wound ostomy eval and treat for new descending colostomy    Electronically signed by Brook Hanks MD  on 12/11/2021 at 6:49 AM     Attending Physician Statement  I have discussed the case, including pertinent history and exam findings with the resident. I agree with the assessment, plan and orders as documented by the resident. Patient seen and examined. Agree with above. Advance to full liquid diet. Discontinue Santo. Encouraged ambulation and out of bed. Ostomy teaching and local wound care including packing of her midline wound.

## 2021-12-11 NOTE — PROGRESS NOTES
Patient stated that she feels gassy. She heard a nose around the colostomy bag. RN assured her that she is passing gas into her Colostomy bag. And we will watch it in case we need to empty the bag.

## 2021-12-11 NOTE — PROGRESS NOTES
[facility]  Initial Consult Note  Date: 12/11/2021    Hospital day :2     Impression:   1. Diverticulitis of the large intestine with associated abscess status post resection of the sigmoid colon with colostomy formation 12/9/2021  2. Recent CT scan still had significant inflammatory changes in the descending colon, sigmoid colon, and rectosigmoid colon with air-fluid levels in the retroperitoneal space with areas of involvement including left psoas muscle, left iliac is muscle    Recommendations   · Omnicef and metronidazole orally to finish 14 days course  · Follow CBC and renal function  · Continue supportive care    Chief complaint/reason for consultation:   Diverticulitis with associated abscesses    History of Present Illness:   Eris Shabazz is a 73y.o.-year-old female who was initially admitted on 12/9/2021. Alvaro Nunes has a known history of diverticulitis with left lower quadrant abdominal abscess, left psoas muscle inflammation/abscess and was treated with intravenous antimicrobial therapy with Zosyn while hospitalized and subsequently switched to ertapenem which she finished. The patient was seen in the office on November 17, 2021 and at that point in time had a CT scan of the abdomen pelvis ordered which was done that continue to show residual intra-abdominal abscess as well as psoas muscle and iliacus muscle involvement. The patient was already scheduled for surgery and was admitted and underwent bowel resection of the sigmoid colon with descending colon colostomy formation on 12/9/2021. The patient was found to have a large inflammatory process noted in the left lower quadrant abdomen with severe diverticular disease involving the sigmoid colon and no large amount of purulence was noted. Dissection into the retroperitoneal area was hampered by significant vasculature but no obvious abscesses were noted.     Interval history 12/11/2021  She is complaining of postoperative pain, History Narrative    Not on file     Social Determinants of Health     Financial Resource Strain:     Difficulty of Paying Living Expenses: Not on file   Food Insecurity:     Worried About Running Out of Food in the Last Year: Not on file    Nadir of Food in the Last Year: Not on file   Transportation Needs:     Lack of Transportation (Medical): Not on file    Lack of Transportation (Non-Medical): Not on file   Physical Activity:     Days of Exercise per Week: Not on file    Minutes of Exercise per Session: Not on file   Stress:     Feeling of Stress : Not on file   Social Connections:     Frequency of Communication with Friends and Family: Not on file    Frequency of Social Gatherings with Friends and Family: Not on file    Attends [employer] Services: Not on file    Active Member of [address] [place] [address] [employer] or Organizations: Not on file    Attends Club or Organization Meetings: Not on file    Marital Status: Not on file   Intimate Partner Violence:     Fear of Current or Ex-Partner: Not on file    Emotionally Abused: Not on file    Physically Abused: Not on file    Sexually Abused: Not on file   Housing Stability:     Unable to Pay for Housing in the Last Year: Not on file    Number of [place] in the Last Year: Not on file    Unstable Housing in the Last Year: Not on file     Family History:   History reviewed. No pertinent family history. Allergies:   Sulfa antibiotics     Review of Systems:   General: No subjective fevers or chills but does have some generalized malaise/fatigue  Eyes: No double vision or blurry vision. ENT: No sore throat or runny nose. Cardiovascular: No chest pain or palpitations. Lung: No shortness of breath or cough. Abdomen: No nausea vomiting but does have some abdominal pain/discomfort  Genitourinary: No increased urinary frequency, or dysuria. Musculoskeletal: No muscle aches or pains. Hematologic: No bleeding or bruising.   Neurologic: No headache, weakness, numbness, or tingling. Physical Examination :   /65   Pulse 77   Temp 97.8 °F (36.6 °C) (Oral)   Resp 17   Ht 5' 8\" (1.727 m)   Wt 190 lb 14.4 oz (86.6 kg)   SpO2 97%   BMI 29.03 kg/m²     Temperature Range: Temp: 97.8 °F (36.6 °C) Temp  Av °F (36.7 °C)  Min: 97.4 °F (36.3 °C)  Max: 98.9 °F (37.2 °C)  General Appearance: Awake, alert, and in no apparent distress  Head: Normocephalic, without obvious abnormality, atraumatic  Eyes: Pupils equal, round, reactive, to light and accommodation; extraocular movements intact; sclera anicteric; conjunctivae pink  ENT: Oropharynx clear, without erythema, exudate, or thrush. Neck: Supple, without lymphadenopathy. Pulmonary/Chest: Clear to auscultation, without wheezes, rales, or rhonchi  Cardiovascular: Regular rate and rhythm without murmurs, rubs, or gallops. Abdomen: Midline abdomen ostomy in place and some generalized tenderness to palpation, nondistended. Extremities: No cyanosis, clubbing, edema, or effusions. Neurologic: No gross sensory or motor deficits. Skin: Warm and dry with no rash. Medical Decision Making:   I have independently reviewed/ordered the following labs:  CBC with Differential:   Recent Labs     12/10/21  0628 21  0447   WBC 8.2 8.1   HGB 9.8* 9.4*   HCT 31.4* 30.6*    448   LYMPHOPCT 10* 20*   MONOPCT 3 8     BMP:   Recent Labs     12/10/21  0628 21  0447   * 135   K 4.3 4.1    103   CO2 19* 26   BUN 13 7*   CREATININE 0.53 0.49*     Hepatic Function Panel: No results for input(s): PROT, LABALBU, BILIDIR, IBILI, BILITOT, ALKPHOS, ALT, AST in the last 72 hours.     Lab Results   Component Value Date    PROCAL 0.05 10/19/2021       Lab Results   Component Value Date    CRP 69.5 10/19/2021     No results found for: FERRITIN  No results found for: FIBRINOGEN  No results found for: DDIMER  No results found for: LDH    Lab Results   Component Value Date    [facility] (H) 10/19/2021       No results found for: COVID19  No results found for requested labs within last 30 days. Imaging Studies:   No results found. Cultures:   NONE      Thank you for allowing us to participate in the care of this patient. Please call with questions. Electronically signed by Christiano Robbins MD on 12/11/2021 at 9:51 AM      [facility]  Christiano Robbins MD  [employer]  OFFICE: [phone]      This note is created with the assistance of a speech recognition program.  While intending to generate a document that actually reflects the content of the visit, the document can still have some errors including those of syntax and sound a like substitutions which may escape proof reading. In such instances, actual meaning can be extrapolated by contextual diversion.

## 2021-12-11 NOTE — PROGRESS NOTES
Changed patient's dressing, Placed Iodaform inbetween the staples and placed ABD over that. Patient tolerated it well. Will continue to monitor.

## 2021-12-11 NOTE — CONSULTS
[address],    Adult Hospitalist      Name: Romero Zamora  MRN: [id number]     Acct: [de-identified]  Room: 2111/2111-01    Admit Date: 12/9/2021 12:37 PM  PCP: Jackie Payton MD    Primary Problem  Active Problems:    S/P colectomy    Diverticular disease    Severe malnutrition ([address]  Resolved Problems:    * No resolved hospital problems. *        Assesment:     · Sigmoid bowel resection dated 12/9/2021   · Descending colon colostomy dated 12/9/2021   · Recurrent sigmoid diverticulitis  · Diabetes mellitus type 2  · Mixed hyperlipidemia  · Overweight  · Hearing impairment        Plan:     · Patient admitted to [facility]  · Monitor vitals closely  · Keep SPO2 above 90%  · I's and O's  · IV fluids  · Pain control  · Antiemetics as needed  · Up in chair  · Ambulate  · Flagyl, Omnicef p.o. per ID  · Crestor resumed  · Metformin held, may be resumed at discharge  · Accu-Cheks before meals and at bedtime  · Insulin sliding scale  · Hypoglycemia protocol  · If blood glucose remains elevated, will start low-dose Lantus to assist better healing of wound  · CBC, BMP  · ID on board  · DVT and GI prophylaxis. Chief Complaint:     No chief complaint on file. Medical management    History of Present Illness:      Romero Zamora is a 73 y.o.  female who presents with No chief complaint on file. Patient admitted after undergoing sigmoid colectomy with colostomy without incident. Patient says pain control has improved. She has been allowed clear liquid diet now and she was able to sit up in chair and ambulate. Patient denies any chest pain, dyspnea or orthopnea. Denies any headache, photophobia or diplopia. Denies any neck pain or back pain. Denies nausea or vomiting. She has not had any flatus or bowel movement yet    I have personally reviewed the past medical history, past surgical history, medications, social history, and family history, and summarized in the note.     Review of Systems:     All 10 point system is reviewed and negative otherwise mentioned in HPI. Past Medical History:     Past Medical History:   Diagnosis Date    Arthritis     knee, back    Asthma     Diverticulitis     GERD (gastroesophageal reflux disease)         Past Surgical History:     Past Surgical History:   Procedure Laterality Date    COLONOSCOPY      HERNIA REPAIR      umbilical    HERNIA REPAIR  2016    incisional with mesh, robotic converted to open    SIGMOID COLECTOMY N/A 2021    BOWEL RESECTION SIGMOID performed by Imani Davila MD at [address]          Medications Prior to Admission:       Prior to Admission medications    Medication Sig Start Date End Date Taking? Authorizing Provider   metFORMIN (GLUCOPHAGE-XR) 500 MG extended release tablet Take 500 mg by mouth 2 times daily (with meals)  10/7/21  Yes Historical Provider, MD   omeprazole (PRILOSEC) 40 MG capsule Take 40 mg by mouth daily as needed    Yes Historical Provider, MD   Multiple Vitamins-Minerals (THERAPEUTIC MULTIVITAMIN-MINERALS) tablet Take 1 tablet by mouth daily   Yes Historical Provider, MD   rosuvastatin (CRESTOR) 5 MG tablet Take 5 mg by mouth nightly    Historical Provider, MD   albuterol sulfate  (90 BASE) MCG/ACT inhaler Inhale 2 puffs into the lungs every 6 hours as needed for Wheezing    Historical Provider, MD        Allergies:       Sulfa antibiotics    Social History:     Tobacco:    reports that she has quit smoking. Her smoking use included cigarettes. She has a 0.75 pack-year smoking history. She has never used smokeless tobacco.  Alcohol:      reports current alcohol use. Drug Use:  reports no history of drug use. Family History:     History reviewed. No pertinent family history.       Physical Exam:     Vitals:  BP (!) 98/59   Pulse 86   Temp 98 °F (36.7 °C) (Oral)   Resp 18   Ht 5' 8\" (1.727 m)   Wt 190 lb 14.4 oz (86.6 kg)   SpO2 97%   BMI 29.03 kg/m²   Temp (24hrs), Av.8 °F (36.6 °C), Min:97.2 °F (36.2 °C), Max:98.9 °F (37.2 °C)          General appearance - alert, well appearing, and in no acute distress  Mental status - oriented to person, place, and time with normal affect  Head - normocephalic and atraumatic  Eyes - pupils equal and reactive, extraocular eye movements intact, conjunctiva clear  Ears - hearing appears to be intact  Nose - no drainage noted  Mouth - mucous membranes moist  Neck - supple, no carotid bruits, thyroid not palpable  Chest - clear to auscultation, normal effort  Heart - normal rate, regular rhythm, no murmur  Abdomen - soft, tender, nondistended, bowel sounds absent all four quadrants, no masses, colostomy.   No hepatomegaly or splenomegaly  Neurological - normal speech, no focal findings or movement disorder noted, cranial nerves II through XII grossly intact  Extremities - peripheral pulses palpable, no pedal edema or calf pain with palpation  Skin - no gross lesions, rashes, or induration noted        Data:     Labs:    Hematology:  Recent Labs     12/09/21  1502 12/10/21  0628   WBC 12.1* 8.2   RBC 4.04 3.72*   HGB 10.7* 9.8*   HCT 33.9* 31.4*   MCV 83.9 84.4   MCH 26.5 26.3   MCHC 31.6 31.2   RDW 15.7* 15.6*    432   MPV 8.7 9.1   INR 1.3  --      Chemistry:  Recent Labs     12/09/21  1300 12/10/21  0628   * 133*   K 3.5* 4.3   CL 95* 100   CO2 21 19*   GLUCOSE 137* 302*   BUN 14 13   CREATININE 0.65 0.53   ANIONGAP 16 14   LABGLOM >60 >60   GFRAA >60 >60   CALCIUM 9.3 8.0*     Recent Labs     12/09/21  2006 12/10/21  0512 12/10/21  1215 12/10/21  1642 12/10/21  1935   POCGLU 149* 265* 273* 284* 273*       Lab Results   Component Value Date    INR 1.3 12/09/2021    INR 1.3 10/26/2021    PROTIME 16.3 (H) 12/09/2021    PROTIME 16.0 (H) 10/26/2021       Lab Results   Component Value Date/Time    SPECIAL NOT REPORTED 10/26/2021 07:30 AM     Lab Results   Component Value Date/Time    CULTURE PATIENT DISCHARGED BEFORE SAMPLE COLLECTED 10/26/2021 07:30 AM       No results found for: POCPH, PHART, PH, POCPCO2, PTO4TLH, PCO2, POCPO2, PO2ART, PO2, POCHCO3, GXO4HVU, HCO3, NBEA, PBEA, BEART, BE, THGBART, THB, [id number], [id number], Y2BNNEPS, O2SAT, FIO2    Radiology:    No results found. All radiological studies reviewed                Code Status:  Full Code    Electronically signed by Dinesh Fuchs MD on 12/10/2021 at 8:16 PM     Copy sent to Dr. Jewel Nissen, MD    This note was created with the assistance of a speech-recognition program.  Although the intention is to generate a document that actually reflects the content of the visit, no guarantees can be provided that every mistake has been identified and corrected by editing. Note was updated later by me after  physical examination and  completion of the assessment.

## 2021-12-12 LAB
ABSOLUTE EOS #: <0.03 K/UL (ref 0–0.44)
ABSOLUTE IMMATURE GRANULOCYTE: 0.06 K/UL (ref 0–0.3)
ABSOLUTE LYMPH #: 1.28 K/UL (ref 1.1–3.7)
ABSOLUTE MONO #: 1.22 K/UL (ref 0.1–1.2)
ANION GAP SERPL CALCULATED.3IONS-SCNC: 10 MMOL/L (ref 9–17)
BASOPHILS # BLD: 0 % (ref 0–2)
BASOPHILS ABSOLUTE: 0.03 K/UL (ref 0–0.2)
BUN BLDV-MCNC: 4 MG/DL (ref 8–23)
BUN/CREAT BLD: 8 (ref 9–20)
CALCIUM SERPL-MCNC: 8.2 MG/DL (ref 8.6–10.4)
CHLORIDE BLD-SCNC: 100 MMOL/L (ref 98–107)
CO2: 23 MMOL/L (ref 20–31)
CREAT SERPL-MCNC: 0.5 MG/DL (ref 0.5–0.9)
DIFFERENTIAL TYPE: ABNORMAL
EOSINOPHILS RELATIVE PERCENT: 0 % (ref 1–4)
ESTIMATED AVERAGE GLUCOSE: 146 MG/DL
GFR AFRICAN AMERICAN: >60 ML/MIN
GFR NON-AFRICAN AMERICAN: >60 ML/MIN
GFR SERPL CREATININE-BSD FRML MDRD: ABNORMAL ML/MIN/{1.73_M2}
GFR SERPL CREATININE-BSD FRML MDRD: ABNORMAL ML/MIN/{1.73_M2}
GLUCOSE BLD-MCNC: 122 MG/DL (ref 65–105)
GLUCOSE BLD-MCNC: 132 MG/DL (ref 65–105)
GLUCOSE BLD-MCNC: 152 MG/DL (ref 65–105)
GLUCOSE BLD-MCNC: 218 MG/DL (ref 70–99)
GLUCOSE BLD-MCNC: 226 MG/DL (ref 65–105)
HBA1C MFR BLD: 6.7 % (ref 4–6)
HCT VFR BLD CALC: 29.1 % (ref 36.3–47.1)
HEMOGLOBIN: 8.9 G/DL (ref 11.9–15.1)
IMMATURE GRANULOCYTES: 1 %
LYMPHOCYTES # BLD: 10 % (ref 24–43)
MCH RBC QN AUTO: 26 PG (ref 25.2–33.5)
MCHC RBC AUTO-ENTMCNC: 30.6 G/DL (ref 28.4–34.8)
MCV RBC AUTO: 85.1 FL (ref 82.6–102.9)
MONOCYTES # BLD: 10 % (ref 3–12)
NRBC AUTOMATED: 0 PER 100 WBC
PDW BLD-RTO: 15.9 % (ref 11.8–14.4)
PLATELET # BLD: 402 K/UL (ref 138–453)
PLATELET ESTIMATE: ABNORMAL
PMV BLD AUTO: 8.6 FL (ref 8.1–13.5)
POTASSIUM SERPL-SCNC: 4 MMOL/L (ref 3.7–5.3)
RBC # BLD: 3.42 M/UL (ref 3.95–5.11)
RBC # BLD: ABNORMAL 10*6/UL
SEG NEUTROPHILS: 80 % (ref 36–65)
SEGMENTED NEUTROPHILS ABSOLUTE COUNT: 10.3 K/UL (ref 1.5–8.1)
SODIUM BLD-SCNC: 133 MMOL/L (ref 135–144)
WBC # BLD: 12.9 K/UL (ref 3.5–11.3)
WBC # BLD: ABNORMAL 10*3/UL

## 2021-12-12 PROCEDURE — 85025 COMPLETE CBC W/AUTO DIFF WBC: CPT

## 2021-12-12 PROCEDURE — 1200000000 HC SEMI PRIVATE

## 2021-12-12 PROCEDURE — C9113 INJ PANTOPRAZOLE SODIUM, VIA: HCPCS | Performed by: STUDENT IN AN ORGANIZED HEALTH CARE EDUCATION/TRAINING PROGRAM

## 2021-12-12 PROCEDURE — 36415 COLL VENOUS BLD VENIPUNCTURE: CPT

## 2021-12-12 PROCEDURE — 99232 SBSQ HOSP IP/OBS MODERATE 35: CPT | Performed by: INTERNAL MEDICINE

## 2021-12-12 PROCEDURE — 6370000000 HC RX 637 (ALT 250 FOR IP): Performed by: FAMILY MEDICINE

## 2021-12-12 PROCEDURE — 99213 OFFICE O/P EST LOW 20 MIN: CPT

## 2021-12-12 PROCEDURE — 6370000000 HC RX 637 (ALT 250 FOR IP): Performed by: INTERNAL MEDICINE

## 2021-12-12 PROCEDURE — 6360000002 HC RX W HCPCS: Performed by: STUDENT IN AN ORGANIZED HEALTH CARE EDUCATION/TRAINING PROGRAM

## 2021-12-12 PROCEDURE — 2500000003 HC RX 250 WO HCPCS: Performed by: STUDENT IN AN ORGANIZED HEALTH CARE EDUCATION/TRAINING PROGRAM

## 2021-12-12 PROCEDURE — 2580000003 HC RX 258: Performed by: STUDENT IN AN ORGANIZED HEALTH CARE EDUCATION/TRAINING PROGRAM

## 2021-12-12 PROCEDURE — 82947 ASSAY GLUCOSE BLOOD QUANT: CPT

## 2021-12-12 PROCEDURE — 83036 HEMOGLOBIN GLYCOSYLATED A1C: CPT

## 2021-12-12 PROCEDURE — 80048 BASIC METABOLIC PNL TOTAL CA: CPT

## 2021-12-12 PROCEDURE — 6370000000 HC RX 637 (ALT 250 FOR IP): Performed by: STUDENT IN AN ORGANIZED HEALTH CARE EDUCATION/TRAINING PROGRAM

## 2021-12-12 RX ADMIN — INSULIN LISPRO 4 UNITS: 100 INJECTION, SOLUTION INTRAVENOUS; SUBCUTANEOUS at 07:44

## 2021-12-12 RX ADMIN — ENOXAPARIN SODIUM 40 MG: 100 INJECTION SUBCUTANEOUS at 08:27

## 2021-12-12 RX ADMIN — METFORMIN HYDROCHLORIDE 500 MG: 500 TABLET, EXTENDED RELEASE ORAL at 16:50

## 2021-12-12 RX ADMIN — ACETAMINOPHEN 1000 MG: 500 TABLET ORAL at 13:36

## 2021-12-12 RX ADMIN — CEFDINIR 300 MG: 300 CAPSULE ORAL at 08:28

## 2021-12-12 RX ADMIN — METFORMIN HYDROCHLORIDE 500 MG: 500 TABLET, EXTENDED RELEASE ORAL at 08:28

## 2021-12-12 RX ADMIN — CEFDINIR 300 MG: 300 CAPSULE ORAL at 22:26

## 2021-12-12 RX ADMIN — ONDANSETRON 4 MG: 2 INJECTION INTRAMUSCULAR; INTRAVENOUS at 21:48

## 2021-12-12 RX ADMIN — INSULIN LISPRO 2 UNITS: 100 INJECTION, SOLUTION INTRAVENOUS; SUBCUTANEOUS at 16:49

## 2021-12-12 RX ADMIN — OXYCODONE 5 MG: 5 TABLET ORAL at 13:36

## 2021-12-12 RX ADMIN — POTASSIUM CHLORIDE, DEXTROSE MONOHYDRATE AND SODIUM CHLORIDE: 150; 5; 450 INJECTION, SOLUTION INTRAVENOUS at 14:21

## 2021-12-12 RX ADMIN — ACETAMINOPHEN 1000 MG: 500 TABLET ORAL at 22:26

## 2021-12-12 RX ADMIN — ROSUVASTATIN CALCIUM 5 MG: 10 TABLET, FILM COATED ORAL at 22:26

## 2021-12-12 RX ADMIN — ACETAMINOPHEN 1000 MG: 500 TABLET ORAL at 08:27

## 2021-12-12 RX ADMIN — PANTOPRAZOLE SODIUM 40 MG: 40 INJECTION, POWDER, FOR SOLUTION INTRAVENOUS at 08:26

## 2021-12-12 RX ADMIN — SODIUM CHLORIDE, PRESERVATIVE FREE 10 ML: 5 INJECTION INTRAVENOUS at 08:26

## 2021-12-12 ASSESSMENT — PAIN DESCRIPTION - PAIN TYPE
TYPE: SURGICAL PAIN
TYPE: SURGICAL PAIN

## 2021-12-12 ASSESSMENT — PAIN DESCRIPTION - LOCATION
LOCATION: ABDOMEN
LOCATION: ABDOMEN

## 2021-12-12 ASSESSMENT — PAIN SCALES - GENERAL
PAINLEVEL_OUTOF10: 0
PAINLEVEL_OUTOF10: 8
PAINLEVEL_OUTOF10: 7

## 2021-12-12 ASSESSMENT — PAIN DESCRIPTION - FREQUENCY
FREQUENCY: INTERMITTENT
FREQUENCY: INTERMITTENT

## 2021-12-12 ASSESSMENT — PAIN DESCRIPTION - PROGRESSION
CLINICAL_PROGRESSION: GRADUALLY IMPROVING
CLINICAL_PROGRESSION: GRADUALLY IMPROVING

## 2021-12-12 ASSESSMENT — PAIN DESCRIPTION - DESCRIPTORS
DESCRIPTORS: DISCOMFORT
DESCRIPTORS: DISCOMFORT

## 2021-12-12 ASSESSMENT — PAIN DESCRIPTION - ORIENTATION
ORIENTATION: LOWER
ORIENTATION: LOWER

## 2021-12-12 ASSESSMENT — PAIN - FUNCTIONAL ASSESSMENT: PAIN_FUNCTIONAL_ASSESSMENT: ACTIVITIES ARE NOT PREVENTED

## 2021-12-12 NOTE — PROGRESS NOTES
[facility]  Initial Consult Note  Date: 12/12/2021    Hospital day :3     Impression:   1. Diverticulitis of the large intestine with associated abscess status post resection of the sigmoid colon with colostomy formation 12/9/2021  2. Recent CT scan still had significant inflammatory changes in the descending colon, sigmoid colon, and rectosigmoid colon with air-fluid levels in the retroperitoneal space with areas of involvement including left psoas muscle, left iliac is muscle    Recommendations   · Omnicef and metronidazole orally to finish 14 days course  · Follow CBC and renal function  · Continue supportive care    Chief complaint/reason for consultation:   Diverticulitis with associated abscesses    History of Present Illness:   Rudy Roe is a 73y.o.-year-old female who was initially admitted on 12/9/2021. Jaymie Luu has a known history of diverticulitis with left lower quadrant abdominal abscess, left psoas muscle inflammation/abscess and was treated with intravenous antimicrobial therapy with Zosyn while hospitalized and subsequently switched to ertapenem which she finished. The patient was seen in the office on November 17, 2021 and at that point in time had a CT scan of the abdomen pelvis ordered which was done that continue to show residual intra-abdominal abscess as well as psoas muscle and iliacus muscle involvement. The patient was already scheduled for surgery and was admitted and underwent bowel resection of the sigmoid colon with descending colon colostomy formation on 12/9/2021. The patient was found to have a large inflammatory process noted in the left lower quadrant abdomen with severe diverticular disease involving the sigmoid colon and no large amount of purulence was noted. Dissection into the retroperitoneal area was hampered by significant vasculature but no obvious abscesses were noted.     Interval history 12/12/2021  She is complaining of postoperative pain and mild nausea, tolerating full liquid diet, ostomy with gas and liquid. She denied fever or chills, denied cough or shortness of breath, no other complaint. Santo catheter was removed yesterday, voiding well. I have personally reviewed the past medical history, past surgical history, medications, social history, and family history, and I have updated the database accordingly. Past Medical History:     Past Medical History:   Diagnosis Date    Arthritis     knee, back    Asthma     Diverticulitis     GERD (gastroesophageal reflux disease)      Past Surgical  History:     Past Surgical History:   Procedure Laterality Date    COLONOSCOPY      HERNIA REPAIR      umbilical    HERNIA REPAIR  07/19/2016    incisional with mesh, robotic converted to open    SIGMOID COLECTOMY N/A 12/9/2021    BOWEL RESECTION SIGMOID performed by Beryle Harvard, MD at [address]       Medications:      metFORMIN  500 mg Oral BID WC    pantoprazole  40 mg IntraVENous Daily    cefdinir  300 mg Oral 2 times per day    metroNIDAZOLE  500 mg Oral 3 times per day    insulin lispro  0-12 Units SubCUTAneous TID WC    insulin lispro  0-6 Units SubCUTAneous Nightly    rosuvastatin  5 mg Oral Nightly    sodium chloride flush  5-40 mL IntraVENous 2 times per day    enoxaparin  40 mg SubCUTAneous Daily    acetaminophen  1,000 mg Oral 3 times per day     Social History:     Social History     Socioeconomic History    Marital status:      Spouse name: Not on file    Number of children: Not on file    Years of education: Not on file    Highest education level: Not on file   Occupational History    Not on file   Tobacco Use    Smoking status: Former Smoker     Packs/day: 0.25     Years: 3.00     Pack years: 0.75     Types: Cigarettes    Smokeless tobacco: Never Used    Tobacco comment: quit smoking at age 22   Vaping Use    Vaping Use: Never used   Substance and Sexual Activity    Alcohol use:  Yes Comment: occassionally    Drug use: No    Sexual activity: Not on file   Other Topics Concern    Not on file   Social History Narrative    Not on file     Social Determinants of Health     Financial Resource Strain:     Difficulty of Paying Living Expenses: Not on file   Food Insecurity:     Worried About Running Out of Food in the Last Year: Not on file    Nadir of Food in the Last Year: Not on file   Transportation Needs:     Lack of Transportation (Medical): Not on file    Lack of Transportation (Non-Medical): Not on file   Physical Activity:     Days of Exercise per Week: Not on file    Minutes of Exercise per Session: Not on file   Stress:     Feeling of Stress : Not on file   Social Connections:     Frequency of Communication with Friends and Family: Not on file    Frequency of Social Gatherings with Friends and Family: Not on file    Attends Protestant Services: Not on file    Active Member of [address] [place] [address] or Organizations: Not on file    Attends Club or Organization Meetings: Not on file    Marital Status: Not on file   Intimate Partner Violence:     Fear of Current or Ex-Partner: Not on file    Emotionally Abused: Not on file    Physically Abused: Not on file    Sexually Abused: Not on file   Housing Stability:     Unable to Pay for Housing in the Last Year: Not on file    Number of [place] in the Last Year: Not on file    Unstable Housing in the Last Year: Not on file     Family History:   History reviewed. No pertinent family history. Allergies:   Sulfa antibiotics     Review of Systems:   General: No subjective fevers or chills but does have some generalized malaise/fatigue  Eyes: No double vision or blurry vision. ENT: No sore throat or runny nose. Cardiovascular: No chest pain or palpitations. Lung: No shortness of breath or cough.   Abdomen: No nausea vomiting but does have some abdominal pain/discomfort  Genitourinary: No increased urinary frequency, or dysuria. Musculoskeletal: No muscle aches or pains. Hematologic: No bleeding or bruising. Neurologic: No headache, weakness, numbness, or tingling. Physical Examination :   /67   Pulse 95   Temp 97.9 °F (36.6 °C) (Oral)   Resp 16   Ht 5' 8\" (1.727 m)   Wt 190 lb 14.4 oz (86.6 kg)   SpO2 94%   BMI 29.03 kg/m²     Temperature Range: Temp: 97.9 °F (36.6 °C) Temp  Av.4 °F (36.9 °C)  Min: 97.9 °F (36.6 °C)  Max: 99.1 °F (37.3 °C)  General Appearance: Awake, alert, and in no apparent distress  Head: Normocephalic, without obvious abnormality, atraumatic  Eyes: Pupils equal, round, reactive, to light and accommodation; extraocular movements intact; sclera anicteric; conjunctivae pink  ENT: Oropharynx clear, without erythema, exudate, or thrush. Neck: Supple, without lymphadenopathy. Pulmonary/Chest: Clear to auscultation, without wheezes, rales, or rhonchi  Cardiovascular: Regular rate and rhythm without murmurs, rubs, or gallops. Abdomen: Midline abdomen ostomy in place and some generalized tenderness to palpation, nondistended. Extremities: No cyanosis, clubbing, edema, or effusions. Neurologic: No gross sensory or motor deficits. Skin: Warm and dry with no rash. Medical Decision Making:   I have independently reviewed/ordered the following labs:  CBC with Differential:   Recent Labs     217 21  0658   WBC 8.1 12.9*   HGB 9.4* 8.9*   HCT 30.6* 29.1*    402   LYMPHOPCT 20* 10*   MONOPCT 8 10     BMP:   Recent Labs     21  0447 21  0658    133*   K 4.1 4.0    100   CO2 26 23   BUN 7* 4*   CREATININE 0.49* 0.50     Hepatic Function Panel: No results for input(s): PROT, LABALBU, BILIDIR, IBILI, BILITOT, ALKPHOS, ALT, AST in the last 72 hours.     Lab Results   Component Value Date    PROCAL 0.05 10/19/2021       Lab Results   Component Value Date    CRP 69.5 10/19/2021     No results found for: FERRITIN  No results found for: FIBRINOGEN  No results found for: DDIMER  No results found for: LDH    Lab Results   Component Value Date    SEDRATE 88 (H) 10/19/2021       No results found for: COVID19  No results found for requested labs within last 30 days. Imaging Studies:   No results found. Cultures:   NONE      Thank you for allowing us to participate in the care of this patient. Please call with questions. Electronically signed by Nona Grossman MD on 12/12/2021 at 11:17 AM      [facility]  Nona Grossman MD  [employer] messaging  OFFICE: [phone]      This note is created with the assistance of a speech recognition program.  While intending to generate a document that actually reflects the content of the visit, the document can still have some errors including those of syntax and sound a like substitutions which may escape proof reading. In such instances, actual meaning can be extrapolated by contextual diversion.

## 2021-12-12 NOTE — PROGRESS NOTES
[facility] Ostomy Continence Nursing  Follow Up      NAME:  Corinne Gourd  MEDICAL RECORD NUMBER:  [id number]  AGE: 73 y.o. GENDER: female  : 1955  TODAY'S DATE:  2021      Ostomy education visit today. The previous pouch hydrocolloid appeared white from moisture and therefore the pouch was changed. A convex pouch was used because the stoma sits in a deep skin fold. The patient may end up needing an ostomy belt. The patient was taught step by step how to change the pouch. Other topics were discussed including review of previous ostomy education provided. Will plan a visit for tomorrow for further education and will plan an outpatient ostomy visit for two weeks after discharge as it is anticipated that she will have pouching concerns.      Robbin Hwang MSN RN, CWS, [facility] and [facility]  Wound, Ostomy, and Continence Nursing  [phone]

## 2021-12-12 NOTE — PLAN OF CARE
Problem: Infection:  Goal: Will remain free from infection  Description: Will remain free from infection  12/12/2021 1101 by Chelle Rangel RN  Outcome: Ongoing  Note: Ongoing     Problem: Safety:  Goal: Free from accidental physical injury  Description: Free from accidental physical injury  12/12/2021 1101 by Chelle Rangel RN  Outcome: Ongoing  Note: Ongoing     Problem: Daily Care:  Goal: Daily care needs are met  Description: Daily care needs are met  12/12/2021 1101 by Chelle Rangel RN  Outcome: Ongoing  Note: Ongoing     Problem: Pain:  Goal: Patient's pain/discomfort is manageable  Description: Patient's pain/discomfort is manageable  12/12/2021 1101 by Chelle Rangel RN  Outcome: Ongoing  Note: Ongoing     Problem: Discharge Planning:  Goal: Patients continuum of care needs are met  Description: Patients continuum of care needs are met  12/12/2021 1101 by Chelle Rangel RN  Outcome: Ongoing  Note: Ongoing     Problem:  Bowel/Gastric:  Goal: Control of bowel function will improve  Description: Control of bowel function will improve  12/12/2021 1101 by Chelle Rangel RN  Outcome: Ongoing  Note: Ongoing     Problem: Skin Integrity:  Goal: Risk for impaired skin integrity will decrease  Description: Risk for impaired skin integrity will decrease  12/12/2021 1101 by Chelle Rangel RN  Outcome: Ongoing  Note: Ongoing

## 2021-12-12 NOTE — PROGRESS NOTES
[address],    Adult Hospitalist      Name: Philippe Romero  MRN: [id number]     Acct: [de-identified]  Room: 2111/2111-01    Admit Date: 12/9/2021 12:37 PM  PCP: Beryle Boast, MD    Primary Problem  Active Problems:    S/P colectomy    Diverticular disease    Severe malnutrition ([address]  Resolved Problems:    * No resolved hospital problems. *        Assesment:     · Sigmoid bowel resection dated 12/9/2021   · Descending colon colostomy dated 12/9/2021   · Recurrent sigmoid diverticulitis  · Diabetes mellitus type 2  · Mixed hyperlipidemia  · Overweight  · Hearing impairment        Plan:     · Patient admitted to [facility]  · Monitor vitals closely  · Keep SPO2 above 90%  · I's and O's  · IV fluids  · Pain control  · Antiemetics as needed  · Up in chair  · Ambulate  · Flagyl, Omnicef p.o. per ID  · Crestor resumed  · Metformin resumed since renal function stable   · Accu-Cheks before meals and at bedtime  · Insulin sliding scale  · Hypoglycemia protocol  · If blood glucose remains elevated, will start low-dose Lantus to assist better healing of wound  · CBC, BMP  · ID on board  · DVT and GI prophylaxis. Chief Complaint:     No chief complaint on file. Medical management    History of Present Illness:        Patient seen and examined at bedside  Says pain control has been better  There is flatus accumulating in the colostomy bag  Patient has been up in chair  Ambulating in the room and going to the restroom  Blood glucose fluctuate  Taking oral diet  Renal function stable  We have resumed Metformin  Continue insulin sliding scale  Denies chest pain, dyspnea or orthopnea  Denies vomiting, fever or chills  Denies rash, neck pain or back pain  Denies headache, joint swelling    Initial HPI  Philippe Romero is a 73 y.o.  female who presents with No chief complaint on file. Patient admitted after undergoing sigmoid colectomy with colostomy without incident. Patient says pain control has improved.   She smoking. Her smoking use included cigarettes. She has a 0.75 pack-year smoking history. She has never used smokeless tobacco.  Alcohol:      reports current alcohol use. Drug Use:  reports no history of drug use. Family History:     History reviewed. No pertinent family history. Physical Exam:     Vitals:  /66   Pulse 96   Temp 97.9 °F (36.6 °C) (Oral)   Resp 18   Ht 5' 8\" (1.727 m)   Wt 190 lb 14.4 oz (86.6 kg)   SpO2 97%   BMI 29.03 kg/m²   Temp (24hrs), Av.9 °F (36.6 °C), Min:97.4 °F (36.3 °C), Max:98.4 °F (36.9 °C)          General appearance - alert, well appearing, and in no acute distress  Mental status - oriented to person, place, and time with normal affect  Head - normocephalic and atraumatic  Eyes - pupils equal and reactive, extraocular eye movements intact, conjunctiva clear  Ears - hearing appears to be intact  Nose - no drainage noted  Mouth - mucous membranes moist  Neck - supple, no carotid bruits, thyroid not palpable  Chest - clear to auscultation, normal effort  Heart - normal rate, regular rhythm, no murmur  Abdomen - soft, tender, nondistended, bowel sounds absent all four quadrants, no masses, colostomy.   No hepatomegaly or splenomegaly  Neurological - normal speech, no focal findings or movement disorder noted, cranial nerves II through XII grossly intact  Extremities - peripheral pulses palpable, no pedal edema or calf pain with palpation  Skin - no gross lesions, rashes, or induration noted        Data:     Labs:    Hematology:  Recent Labs     21  1502 12/10/21  0628 21  0447   WBC 12.1* 8.2 8.1   RBC 4.04 3.72* 3.54*   HGB 10.7* 9.8* 9.4*   HCT 33.9* 31.4* 30.6*   MCV 83.9 84.4 86.4   MCH 26.5 26.3 26.6   MCHC 31.6 31.2 30.7   RDW 15.7* 15.6* 15.8*    432 448   MPV 8.7 9.1 9.0   INR 1.3  --   --      Chemistry:  Recent Labs     21  1300 12/10/21  0628 21  0447   * 133* 135   K 3.5* 4.3 4.1   CL 95* 100 103   CO2 21 19* 26 GLUCOSE 137* 302* 209*   BUN 14 13 7*   CREATININE 0.65 0.53 0.49*   ANIONGAP 16 14 6*   LABGLOM >60 >60 >60   GFRAA >60 >60 >60   CALCIUM 9.3 8.0* 8.1*     Recent Labs     12/10/21  1215 12/10/21  1642 12/10/21  1935 12/11/21  0616 12/11/21  1134 12/11/21  1615   POCGLU 273* 284* 273* 191* 206* 149*       Lab Results   Component Value Date    INR 1.3 12/09/2021    INR 1.3 10/26/2021    PROTIME 16.3 (H) 12/09/2021    PROTIME 16.0 (H) 10/26/2021       Lab Results   Component Value Date/Time    SPECIAL NOT REPORTED 10/26/2021 07:30 AM     Lab Results   Component Value Date/Time    CULTURE PATIENT DISCHARGED BEFORE SAMPLE COLLECTED 10/26/2021 07:30 AM       No results found for: POCPH, PHART, PH, POCPCO2, SOV6LKZ, PCO2, POCPO2, PO2ART, PO2, POCHCO3, OUV2SSI, HCO3, NBEA, PBEA, BEART, BE, THGBART, THB, GJG5HOD, [id number], S6QSLJNN, O2SAT, FIO2    Radiology:    No results found. All radiological studies reviewed                Code Status:  Full Code    Electronically signed by Samuel Bains MD on 12/11/2021 at 7:18 PM     Copy sent to Dr. Man Hill MD    This note was created with the assistance of a speech-recognition program.  Although the intention is to generate a document that actually reflects the content of the visit, no guarantees can be provided that every mistake has been identified and corrected by editing. Note was updated later by me after  physical examination and  completion of the assessment.

## 2021-12-12 NOTE — PLAN OF CARE
Problem: Infection:  Goal: Will remain free from infection  Description: Will remain free from infection  12/12/2021 0115 by Patty Mccormack RN  Outcome: Ongoing     Problem: Safety:  Goal: Free from accidental physical injury  Description: Free from accidental physical injury  12/12/2021 0115 by Patty Mccormack RN  Outcome: Ongoing     Problem: Safety:  Goal: Free from intentional harm  Description: Free from intentional harm  Outcome: Ongoing     Problem: Daily Care:  Goal: Daily care needs are met  Description: Daily care needs are met  Outcome: Ongoing     Problem: Pain:  Goal: Patient's pain/discomfort is manageable  Description: Patient's pain/discomfort is manageable  12/12/2021 0115 by Patty Mccormack RN  Outcome: Ongoing     Problem: Skin Integrity:  Goal: Skin integrity will stabilize  Description: Skin integrity will stabilize  Outcome: Ongoing     Problem: Discharge Planning:  Goal: Patients continuum of care needs are met  Description: Patients continuum of care needs are met  Outcome: Ongoing     Problem: Nutrition  Goal: Optimal nutrition therapy  Outcome: Ongoing     Problem: Bowel/Gastric:  Goal: Control of bowel function will improve  Description: Control of bowel function will improve  12/12/2021 0115 by Patty Mccormack RN  Outcome: Ongoing     Problem:  Bowel/Gastric:  Goal: Ability to achieve a regular elimination pattern will improve  Description: Ability to achieve a regular elimination pattern will improve  Outcome: Ongoing     Problem: Nutritional:  Goal: Ability to follow a diet with enough fiber (20 to 30 grams) for normal bowel function will improve  Description: Ability to follow a diet with enough fiber (20 to 30 grams) for normal bowel function will improve  Outcome: Ongoing     Problem: Skin Integrity:  Goal: Risk for impaired skin integrity will decrease  Description: Risk for impaired skin integrity will decrease  Outcome: Ongoing

## 2021-12-12 NOTE — PROGRESS NOTES
[address],    Adult Hospitalist      Name: Everett Billy  MRN: [id number]     Acct: [de-identified]  Room: 2111/2111-01    Admit Date: 12/9/2021 12:37 PM  PCP: Doc Carter MD    Primary Problem  Active Problems:    S/P colectomy    Diverticular disease    Severe malnutrition ([address]  Resolved Problems:    * No resolved hospital problems. *        Assesment:     · Sigmoid bowel resection dated 12/9/2021   · Descending colon colostomy dated 12/9/2021   · Recurrent sigmoid diverticulitis  · Diabetes mellitus type 2 - HbA1C 6.7  · Mixed hyperlipidemia  · Overweight  · Hearing impairment        Plan:     · Patient admitted to [facility]  · Monitor vitals closely  · Keep SPO2 above 90%  · I's and O's  · IV fluids  · Pain control  · Antiemetics as needed  · Up in chair  · Ambulate  · Flagyl, Omnicef p.o. per ID  · Crestor resumed  · Metformin resumed since renal function stable   · Accu-Cheks before meals and at bedtime  · Insulin sliding scale  · Hypoglycemia protocol  · If blood glucose remains elevated, will start low-dose Lantus to assist better healing of wound  · CBC, BMP  · ID on board  · DVT and GI prophylaxis. Chief Complaint:     No chief complaint on file. Medical management    History of Present Illness:        Patient seen and examined at bedside  Says pain control has been better  There is flatus accumulating in the colostomy bag  No BM yet  Patient has been up in chair  Ambulating in the room and going to the restroom    Denies chest pain, dyspnea or orthopnea  Denies vomiting, fever or chills  Denies rash, neck pain or back pain  Denies headache, joint swelling    Initial HPI  Everett Ards is a 73 y.o.  female who presents with No chief complaint on file. Patient admitted after undergoing sigmoid colectomy with colostomy without incident. Patient says pain control has improved. She has been allowed clear liquid diet now and she was able to sit up in chair and ambulate.     Patient denies any chest pain, dyspnea or orthopnea. Denies any headache, photophobia or diplopia. Denies any neck pain or back pain. Denies nausea or vomiting. She has not had any flatus or bowel movement yet    I have personally reviewed the past medical history, past surgical history, medications, social history, and family history, and summarized in the note. Review of Systems:     All 10 point system is reviewed and negative otherwise mentioned in HPI. Past Medical History:     Past Medical History:   Diagnosis Date    Arthritis     knee, back    Asthma     Diverticulitis     GERD (gastroesophageal reflux disease)         Past Surgical History:     Past Surgical History:   Procedure Laterality Date    COLONOSCOPY      HERNIA REPAIR      umbilical    HERNIA REPAIR  07/19/2016    incisional with mesh, robotic converted to open    SIGMOID COLECTOMY N/A 12/9/2021    BOWEL RESECTION SIGMOID performed by Donavan Dawkins MD at [place] [address]          Medications Prior to Admission:       Prior to Admission medications    Medication Sig Start Date End Date Taking? Authorizing Provider   metFORMIN (GLUCOPHAGE-XR) 500 MG extended release tablet Take 500 mg by mouth 2 times daily (with meals)  10/7/21  Yes Historical Provider, MD   omeprazole (PRILOSEC) 40 MG capsule Take 40 mg by mouth daily as needed    Yes Historical Provider, MD   Multiple Vitamins-Minerals (THERAPEUTIC MULTIVITAMIN-MINERALS) tablet Take 1 tablet by mouth daily   Yes Historical Provider, MD   rosuvastatin (CRESTOR) 5 MG tablet Take 5 mg by mouth nightly    Historical Provider, MD   albuterol sulfate  (90 BASE) MCG/ACT inhaler Inhale 2 puffs into the lungs every 6 hours as needed for Wheezing    Historical Provider, MD        Allergies:       Sulfa antibiotics    Social History:     Tobacco:    reports that she has quit smoking. Her smoking use included cigarettes. She has a 0.75 pack-year smoking history.  She has never used smokeless tobacco.  Alcohol:      reports current alcohol use. Drug Use:  reports no history of drug use. Family History:     History reviewed. No pertinent family history. Physical Exam:     Vitals:  /67   Pulse 95   Temp 97.9 °F (36.6 °C) (Oral)   Resp 16   Ht 5' 8\" (1.727 m)   Wt 190 lb 14.4 oz (86.6 kg)   SpO2 94%   BMI 29.03 kg/m²   Temp (24hrs), Av.4 °F (36.9 °C), Min:97.9 °F (36.6 °C), Max:99.1 °F (37.3 °C)      General appearance - alert, well appearing, and in no acute distress  Mental status - oriented to person, place, and time with normal affect  Head - normocephalic and atraumatic  Eyes - extraocular eye movements intact, conjunctiva clear  Ears - hearing appears to be intact  Nose - no drainage noted  Mouth - mucous membranes moist  Neck - supple, no carotid bruits, thyroid not palpable  Chest - clear to auscultation, normal effort  Heart - normal rate, regular rhythm, no murmur  Abdomen - soft, tender, nondistended, bowel sounds present, no masses, flatus in  colostomy.   No hepatomegaly or splenomegaly  Neurological - normal speech, no focal findings or movement disorder noted, cranial nerves II through XII grossly intact  Extremities - peripheral pulses palpable, no pedal edema or calf pain with palpation  Skin - no gross lesions, rashes, or induration noted        Data:     Labs:    Hematology:  Recent Labs     12/10/21  0628 217 21  0658   WBC 8.2 8.1 12.9*   RBC 3.72* 3.54* 3.42*   HGB 9.8* 9.4* 8.9*   HCT 31.4* 30.6* 29.1*   MCV 84.4 86.4 85.1   MCH 26.3 26.6 26.0   MCHC 31.2 30.7 30.6   RDW 15.6* 15.8* 15.9*    448 402   MPV 9.1 9.0 8.6     Chemistry:  Recent Labs     12/10/21  0628 21  0447 21  0658   * 135 133*   K 4.3 4.1 4.0    103 100   CO2 19* 26 23   GLUCOSE 302* 209* 218*   BUN 13 7* 4*   CREATININE 0.53 0.49* 0.50   ANIONGAP 14 6* 10   LABGLOM >60 >60 >60   GFRAA >60 >60 >60   CALCIUM 8.0* 8.1* 8.2*     Recent Labs     12/11/21  0447 12/11/21  0616 12/11/21  1134 12/11/21  1615 12/11/21  1932 12/12/21  0615 12/12/21  1148 12/12/21  1620   LABA1C 6.7*  --   --   --   --   --   --   --    POCGLU  --    < > 206* 149* 136* 226* 132* 152*    < > = values in this interval not displayed. Lab Results   Component Value Date    INR 1.3 12/09/2021    INR 1.3 10/26/2021    PROTIME 16.3 (H) 12/09/2021    PROTIME 16.0 (H) 10/26/2021       Lab Results   Component Value Date/Time    SPECIAL NOT REPORTED 10/26/2021 07:30 AM     Lab Results   Component Value Date/Time    CULTURE PATIENT DISCHARGED BEFORE SAMPLE COLLECTED 10/26/2021 07:30 AM       No results found for: POCPH, PHART, PH, POCPCO2, YYQ8HMG, PCO2, POCPO2, PO2ART, PO2, POCHCO3, DVM2KZH, HCO3, NBEA, PBEA, BEART, BE, THGBART, THB, NCA9WRZ, TBTC8ONF, N3OWEKVG, O2SAT, FIO2    Radiology:    No results found. All radiological studies reviewed                Code Status:  Full Code    Electronically signed by Johan Mccabe MD on 12/12/2021 at 6:26 PM     Copy sent to Dr. Raeann Ortiz MD    This note was created with the assistance of a speech-recognition program.  Although the intention is to generate a document that actually reflects the content of the visit, no guarantees can be provided that every mistake has been identified and corrected by editing. Note was updated later by me after  physical examination and  completion of the assessment.

## 2021-12-13 LAB
ABSOLUTE EOS #: 0.06 K/UL (ref 0–0.44)
ABSOLUTE IMMATURE GRANULOCYTE: 0.06 K/UL (ref 0–0.3)
ABSOLUTE LYMPH #: 1.35 K/UL (ref 1.1–3.7)
ABSOLUTE MONO #: 1.17 K/UL (ref 0.1–1.2)
ANION GAP SERPL CALCULATED.3IONS-SCNC: 11 MMOL/L (ref 9–17)
BASOPHILS # BLD: 0 % (ref 0–2)
BASOPHILS ABSOLUTE: 0.03 K/UL (ref 0–0.2)
BUN BLDV-MCNC: 4 MG/DL (ref 8–23)
BUN/CREAT BLD: 8 (ref 9–20)
CALCIUM SERPL-MCNC: 8.4 MG/DL (ref 8.6–10.4)
CHLORIDE BLD-SCNC: 101 MMOL/L (ref 98–107)
CO2: 23 MMOL/L (ref 20–31)
CREAT SERPL-MCNC: 0.51 MG/DL (ref 0.5–0.9)
DIFFERENTIAL TYPE: ABNORMAL
EOSINOPHILS RELATIVE PERCENT: 1 % (ref 1–4)
ESTIMATED AVERAGE GLUCOSE: 146 MG/DL
GFR AFRICAN AMERICAN: >60 ML/MIN
GFR NON-AFRICAN AMERICAN: >60 ML/MIN
GFR SERPL CREATININE-BSD FRML MDRD: ABNORMAL ML/MIN/{1.73_M2}
GFR SERPL CREATININE-BSD FRML MDRD: ABNORMAL ML/MIN/{1.73_M2}
GLUCOSE BLD-MCNC: 112 MG/DL (ref 65–105)
GLUCOSE BLD-MCNC: 133 MG/DL (ref 65–105)
GLUCOSE BLD-MCNC: 150 MG/DL (ref 65–105)
GLUCOSE BLD-MCNC: 160 MG/DL (ref 70–99)
GLUCOSE BLD-MCNC: 162 MG/DL (ref 65–105)
HBA1C MFR BLD: 6.7 % (ref 4–6)
HCT VFR BLD CALC: 30.9 % (ref 36.3–47.1)
HEMOGLOBIN: 9.2 G/DL (ref 11.9–15.1)
IMMATURE GRANULOCYTES: 1 %
LYMPHOCYTES # BLD: 11 % (ref 24–43)
MCH RBC QN AUTO: 26.1 PG (ref 25.2–33.5)
MCHC RBC AUTO-ENTMCNC: 29.8 G/DL (ref 28.4–34.8)
MCV RBC AUTO: 87.5 FL (ref 82.6–102.9)
MONOCYTES # BLD: 9 % (ref 3–12)
NRBC AUTOMATED: 0 PER 100 WBC
PDW BLD-RTO: 16 % (ref 11.8–14.4)
PLATELET # BLD: 421 K/UL (ref 138–453)
PLATELET ESTIMATE: ABNORMAL
PMV BLD AUTO: 8.6 FL (ref 8.1–13.5)
POTASSIUM SERPL-SCNC: 4.2 MMOL/L (ref 3.7–5.3)
RBC # BLD: 3.53 M/UL (ref 3.95–5.11)
RBC # BLD: ABNORMAL 10*6/UL
SEG NEUTROPHILS: 78 % (ref 36–65)
SEGMENTED NEUTROPHILS ABSOLUTE COUNT: 9.82 K/UL (ref 1.5–8.1)
SODIUM BLD-SCNC: 135 MMOL/L (ref 135–144)
SURGICAL PATHOLOGY REPORT: NORMAL
WBC # BLD: 12.5 K/UL (ref 3.5–11.3)
WBC # BLD: ABNORMAL 10*3/UL

## 2021-12-13 PROCEDURE — 2500000003 HC RX 250 WO HCPCS: Performed by: STUDENT IN AN ORGANIZED HEALTH CARE EDUCATION/TRAINING PROGRAM

## 2021-12-13 PROCEDURE — 99232 SBSQ HOSP IP/OBS MODERATE 35: CPT | Performed by: INTERNAL MEDICINE

## 2021-12-13 PROCEDURE — 36415 COLL VENOUS BLD VENIPUNCTURE: CPT

## 2021-12-13 PROCEDURE — 6360000002 HC RX W HCPCS: Performed by: STUDENT IN AN ORGANIZED HEALTH CARE EDUCATION/TRAINING PROGRAM

## 2021-12-13 PROCEDURE — 2580000003 HC RX 258: Performed by: STUDENT IN AN ORGANIZED HEALTH CARE EDUCATION/TRAINING PROGRAM

## 2021-12-13 PROCEDURE — 82947 ASSAY GLUCOSE BLOOD QUANT: CPT

## 2021-12-13 PROCEDURE — 6370000000 HC RX 637 (ALT 250 FOR IP): Performed by: INTERNAL MEDICINE

## 2021-12-13 PROCEDURE — 6370000000 HC RX 637 (ALT 250 FOR IP): Performed by: STUDENT IN AN ORGANIZED HEALTH CARE EDUCATION/TRAINING PROGRAM

## 2021-12-13 PROCEDURE — 99213 OFFICE O/P EST LOW 20 MIN: CPT

## 2021-12-13 PROCEDURE — C9113 INJ PANTOPRAZOLE SODIUM, VIA: HCPCS | Performed by: STUDENT IN AN ORGANIZED HEALTH CARE EDUCATION/TRAINING PROGRAM

## 2021-12-13 PROCEDURE — 6370000000 HC RX 637 (ALT 250 FOR IP): Performed by: FAMILY MEDICINE

## 2021-12-13 PROCEDURE — 80048 BASIC METABOLIC PNL TOTAL CA: CPT

## 2021-12-13 PROCEDURE — 85025 COMPLETE CBC W/AUTO DIFF WBC: CPT

## 2021-12-13 PROCEDURE — 1200000000 HC SEMI PRIVATE

## 2021-12-13 RX ORDER — AMOXICILLIN AND CLAVULANATE POTASSIUM 875; 125 MG/1; MG/1
1 TABLET, FILM COATED ORAL EVERY 12 HOURS SCHEDULED
Status: DISCONTINUED | OUTPATIENT
Start: 2021-12-13 | End: 2021-12-14 | Stop reason: HOSPADM

## 2021-12-13 RX ADMIN — POTASSIUM CHLORIDE, DEXTROSE MONOHYDRATE AND SODIUM CHLORIDE: 150; 5; 450 INJECTION, SOLUTION INTRAVENOUS at 06:15

## 2021-12-13 RX ADMIN — ACETAMINOPHEN 1000 MG: 500 TABLET ORAL at 05:58

## 2021-12-13 RX ADMIN — SODIUM CHLORIDE, PRESERVATIVE FREE 10 ML: 5 INJECTION INTRAVENOUS at 09:42

## 2021-12-13 RX ADMIN — AMOXICILLIN AND CLAVULANATE POTASSIUM 1 TABLET: 875; 125 TABLET, FILM COATED ORAL at 09:42

## 2021-12-13 RX ADMIN — PANTOPRAZOLE SODIUM 40 MG: 40 INJECTION, POWDER, FOR SOLUTION INTRAVENOUS at 09:42

## 2021-12-13 RX ADMIN — METFORMIN HYDROCHLORIDE 500 MG: 500 TABLET, EXTENDED RELEASE ORAL at 09:42

## 2021-12-13 RX ADMIN — INSULIN LISPRO 2 UNITS: 100 INJECTION, SOLUTION INTRAVENOUS; SUBCUTANEOUS at 09:43

## 2021-12-13 RX ADMIN — METFORMIN HYDROCHLORIDE 500 MG: 500 TABLET, EXTENDED RELEASE ORAL at 18:04

## 2021-12-13 RX ADMIN — ROSUVASTATIN CALCIUM 5 MG: 10 TABLET, FILM COATED ORAL at 22:36

## 2021-12-13 RX ADMIN — METRONIDAZOLE 500 MG: 500 TABLET ORAL at 05:59

## 2021-12-13 RX ADMIN — ACETAMINOPHEN 1000 MG: 500 TABLET ORAL at 15:34

## 2021-12-13 RX ADMIN — ENOXAPARIN SODIUM 40 MG: 100 INJECTION SUBCUTANEOUS at 09:43

## 2021-12-13 RX ADMIN — ACETAMINOPHEN 1000 MG: 500 TABLET ORAL at 22:37

## 2021-12-13 RX ADMIN — INSULIN LISPRO 1 UNITS: 100 INJECTION, SOLUTION INTRAVENOUS; SUBCUTANEOUS at 22:48

## 2021-12-13 RX ADMIN — ONDANSETRON 4 MG: 2 INJECTION INTRAMUSCULAR; INTRAVENOUS at 06:13

## 2021-12-13 RX ADMIN — AMOXICILLIN AND CLAVULANATE POTASSIUM 1 TABLET: 875; 125 TABLET, FILM COATED ORAL at 22:36

## 2021-12-13 ASSESSMENT — PAIN SCALES - GENERAL
PAINLEVEL_OUTOF10: 2
PAINLEVEL_OUTOF10: 6
PAINLEVEL_OUTOF10: 6

## 2021-12-13 ASSESSMENT — PAIN DESCRIPTION - PAIN TYPE
TYPE: SURGICAL PAIN
TYPE: SURGICAL PAIN

## 2021-12-13 ASSESSMENT — PAIN DESCRIPTION - ONSET
ONSET: ON-GOING
ONSET: ON-GOING

## 2021-12-13 ASSESSMENT — PAIN DESCRIPTION - DESCRIPTORS
DESCRIPTORS: ACHING
DESCRIPTORS: ACHING

## 2021-12-13 ASSESSMENT — PAIN - FUNCTIONAL ASSESSMENT
PAIN_FUNCTIONAL_ASSESSMENT: PREVENTS OR INTERFERES SOME ACTIVE ACTIVITIES AND ADLS
PAIN_FUNCTIONAL_ASSESSMENT: ACTIVITIES ARE NOT PREVENTED

## 2021-12-13 ASSESSMENT — PAIN DESCRIPTION - ORIENTATION: ORIENTATION: RIGHT;LOWER

## 2021-12-13 ASSESSMENT — PAIN DESCRIPTION - PROGRESSION: CLINICAL_PROGRESSION: GRADUALLY IMPROVING

## 2021-12-13 ASSESSMENT — PAIN DESCRIPTION - LOCATION
LOCATION: ABDOMEN
LOCATION: ABDOMEN

## 2021-12-13 ASSESSMENT — ENCOUNTER SYMPTOMS
ABDOMINAL PAIN: 1
RESPIRATORY NEGATIVE: 1

## 2021-12-13 ASSESSMENT — PAIN DESCRIPTION - FREQUENCY
FREQUENCY: INTERMITTENT
FREQUENCY: INTERMITTENT

## 2021-12-13 NOTE — PROGRESS NOTES
[facility]  Progress Note    Sammy Andres  MRN: [id number]  Date: 12/13/2021  LOS: 4     Reason for F/U :   Diverticulitis with diverticular abscess    Impression :   1. Diverticulitis of the large intestine with associated abscess status post resection of the sigmoid colon with colostomy formation 12/9/2021  2. Recent CT scan still had significant inflammatory changes in the descending colon, sigmoid colon, and rectosigmoid colon with air-fluid levels in the retroperitoneal space with areas of involvement including left psoas muscle, left iliac       Recommendations:   · The patient did have an issue tolerating the oral metronidazole with complaints of nausea. · Antibiotic therapy was switched to Augmentin earlier today by the surgical team.  · The only concern would be coverage of E. coli with the amoxicillin  · We will follow her clinical progress and adjust therapy accordingly    Infection Control Recommendations:   Universal precautions    Discharge Planning:   Patient will need Midline Catheter Insertion/ PICC line Insertion: No  Patient will need: Home IV , [place],  SNF,  LTAC: Undetermined  Patient willneed outpatient wound care: No    Medical Decision making / Summary of Stay:   Sammy Andres is a 73y.o.-year-old female who was initially admitted on 12/9/2021.    Katelyn Bro has a known history of diverticulitis with left lower quadrant abdominal abscess, left psoas muscle inflammation/abscess and was treated with intravenous antimicrobial therapy with Zosyn while hospitalized and subsequently switched to ertapenem which she finished.     The patient was seen in the office on November 17, 2021 and at that point in time had a CT scan of the abdomen pelvis ordered which was done that continue to show residual intra-abdominal abscess as well as psoas muscle and iliacus muscle involvement.     The patient was already scheduled for surgery and was admitted and underwent bowel resection of the sigmoid colon with descending colon colostomy formation on 2021. The patient was found to have a large inflammatory process noted in the left lower quadrant abdomen with severe diverticular disease involving the sigmoid colon and no large amount of purulence was noted. Dissection into the retroperitoneal area was hampered by significant vasculature but no obvious abscesses were noted.     The patient is postoperative day #1 today and is overall feeling better. She does have some generalized malaise/fatigue but does not report any subjective fevers or chills. I was asked to evaluate and help with management    Current evaluation:2021    BP 98/60   Pulse 85   Temp 98.1 °F (36.7 °C) (Oral)   Resp 18   Ht 5' 8\" (1.727 m)   Wt 200 lb (90.7 kg)   SpO2 99%   BMI 30.41 kg/m²     Temperature Range: Temp: 98.1 °F (36.7 °C) Temp  Av.3 °F (36.8 °C)  Min: 98 °F (36.7 °C)  Max: 98.8 °F (37.1 °C)  The patient is seen and evaluated at bedside and is awake and alert in no acute distress. No subjective fevers or chills. She does have some abdominal pain but no nausea vomiting or diarrhea. Review of Systems   Constitutional: Negative. Respiratory: Negative. Cardiovascular: Negative. Gastrointestinal: Positive for abdominal pain. Genitourinary: Negative. Musculoskeletal: Negative. Skin: Negative. Neurological: Negative. Psychiatric/Behavioral: Negative. Physical Examination :     Physical Exam  HENT:      Head: Normocephalic and atraumatic. Eyes:      General: No scleral icterus. Pupils: Pupils are equal, round, and reactive to light. Cardiovascular:      Rate and Rhythm: Normal rate. Heart sounds: Normal heart sounds. No murmur heard. Pulmonary:      Effort: Pulmonary effort is normal.      Breath sounds: Normal breath sounds. No wheezing or rales. Abdominal:      General: Bowel sounds are normal.      Palpations: Abdomen is soft. There is no mass. Tenderness: There is abdominal tenderness. Comments: Left lower quadrant ostomy   Musculoskeletal:         General: No deformity. Normal range of motion. Cervical back: Normal range of motion and neck supple. Lymphadenopathy:      Cervical: No cervical adenopathy. Skin:     General: Skin is warm and dry. Findings: No rash. Neurological:      Mental Status: She is alert and oriented to person, place, and time. Laboratory data:   I have independently reviewed the followinglabs:  CBC with Differential:   Recent Labs     12/12/21  0658 12/13/21  0635   WBC 12.9* 12.5*   HGB 8.9* 9.2*   HCT 29.1* 30.9*    421   LYMPHOPCT 10* 11*   MONOPCT 10 9     BMP:   Recent Labs     12/12/21  0658 12/13/21  0635   * 135   K 4.0 4.2    101   CO2 23 23   BUN 4* 4*   CREATININE 0.50 0.51     Hepatic Function Panel: No results for input(s): PROT, LABALBU, BILIDIR, IBILI, BILITOT, ALKPHOS, ALT, AST in the last 72 hours. Lab Results   Component Value Date    PROCAL 0.05 10/19/2021     Lab Results   Component Value Date    CRP 69.5 10/19/2021     Lab Results   Component Value Date    SEDRATE 88 (H) 10/19/2021         No results found for: DDIMER  No results found for: FERRITIN  No results found for: LDH  No results found for: FIBRINOGEN    No results found for requested labs within last 30 days. No results found for: COVID19    No results for input(s): VANCOTROUGH in the last 72 hours.     Imaging Studies:   No new imaging    Cultures:   None    Medications:      amoxicillin-clavulanate  1 tablet Oral 2 times per day    metFORMIN  500 mg Oral BID WC    pantoprazole  40 mg IntraVENous Daily    insulin lispro  0-12 Units SubCUTAneous TID WC    insulin lispro  0-6 Units SubCUTAneous Nightly    rosuvastatin  5 mg Oral Nightly    sodium chloride flush  5-40 mL IntraVENous 2 times per day    enoxaparin  40 mg SubCUTAneous Daily    acetaminophen  1,000 mg Oral 3 times per day [facility]  Waqas Hopper MD  [employer] messaging  OFFICE: [phone]      Electronically signed by Waqas Hopper MD on 12/13/2021 at 12:26 PM  Thank you for allowing us to participate in the care of this patient. Please call with questions. This note iscreated with the assistance of a speech recognition program.  While intending to generate a document that actually reflects the content of the visit, the document can still have some errors including those of syntax andsound a like substitutions which may escape proof reading. In such instances, actual meaning can be extrapolated by contextual diversion.

## 2021-12-13 NOTE — PROGRESS NOTES
2019       Kay Hall MD  [address] [address]  VIA Facsimile: [phone]      Patient: Ilya Farias   YOB: 1961   Date of Visit: 2019       Dear Dr. Hall:    I saw your patient, Ilya Farias, for an evaluation. Below are my notes for this visit with him.    If you have questions, please do not hesitate to call me.      Sincerely,        Anton Shoemaker MD        CC: No Recipients  Anton Shoemaker MD  2019  1:37 PM  Sign when Signing Visit  UROLOGY NOTE    Patient: Ilya Farias    : 1961  MRN: [id number]        CHIEF COMPLAINT: Consultation (elevated PSA.//JA)      HISTORY OF PRESENT ILLNESS: Ilya Farias is a 58 year old with h/o ED on prn cialis otherwise without prior urologic history who presents today for evaluation of elevated psa. PSA has been 20 and 26 in 2019 and 2019, respectively. He did see a urologist who recommended MRI prostate followed by possible prostate biopsy. Insurance did not approve MRI. Patient and wife seeking second opinion. Patient wife is a patient of our office. Denies urinary complaints. Previous urologist performed SEVEN - no nodularity or abnormality.      REVIEW OF SYSTEMS:  Review of Systems  All systems are negative unless otherwise stated in the HPI        Past Medical History:   Diagnosis Date   • DM (diabetes mellitus) (CMS/HCC)    • Hypertension          Past Surgical History:   Procedure Laterality Date   • No past surgeries           Family History   Problem Relation Age of Onset   • Congestive Heart Failure Father      Denies family history of urologic malignancy unless otherwise indicated above.        Tobacco Use: Quit          Packs/Day: 0     Years:           Alcohol Use: Yes               Drug Use:    Never               MEDICATIONS  Current Outpatient Medications   Medication Sig Dispense Refill   • furosemide (LASIX) 20 MG tablet Take 20 mg by  Sepsis screen alert reported to Dr Douglas Acevedo. Patient up to the bathroom, no distress. mouth.     • losartan (COZAAR) 100 MG tablet Take 100 mg by mouth daily.  1   • metformin (GLUCOPHAGE) 1000 MG tablet Take 1,000 mg by mouth 2 times daily.  0   • Multiple Vitamin (VITAMIN - THERAPEUTIC MULTIVITAMIN) capsule      • tadalafil (CIALIS) 10 MG tablet Take 10 mg by mouth.     • Multiple Vitamins-Minerals (MULTIVITAMIN ADULT PO)        No current facility-administered medications for this visit.      Medications were reviewed and updated today.      ALLERGIES  ALLERGIES:  No Known Allergies        Physical Exam   Constitutional: He is oriented to person, place, and time. He appears well-developed and well-nourished.   HENT:   Head: Normocephalic and atraumatic.   Right Ear: External ear normal.   Left Ear: External ear normal.   Eyes: Pupils are equal, round, and reactive to light.   Neck: Normal range of motion.   Cardiovascular: Normal rate, regular rhythm and normal heart sounds.   Pulmonary/Chest: Effort normal and breath sounds normal.   Abdominal: Soft. He exhibits no distension. There is no tenderness. There is no rebound. Musculoskeletal: Normal range of motion.         General: No tenderness or edema.     Neurological: He is alert and oriented to person, place, and time.   Skin: Skin is warm. No rash noted. No erythema. No pallor.      Visit Vitals  /90 (BP Location: RUE - Right upper extremity, Patient Position: Sitting, Cuff Size: Large Adult)   Pulse 70   Temp 98.2 °F (36.8 °C) (Oral)   Resp 22   Ht 5' 8\" (1.727 m)   Wt 108.9 kg (240 lb)   BMI 36.49 kg/m²        _______________________________    LABORATORY  No results found for this visit on 12/11/19.    No results found for any previous visit.     PSA 26.05 (H) 11/04/2019 11:57 AM   PSA 20.05 (H) 10/18/2019 11:15 AM  PSAFR 0.859 11/04/2019 11:57 AM       _______________________________    ASSESSMENT & PLAN  1. Elevated PSA      - PVR 11mL at previous urologist in November 2019  - we reviewed patient's records with patient and wife.   -  psa elevated on two separate lab studies in October and November this year, both > 20ng/mL  - ALIN per previous urologist was negative - patient deferring repeat alin.  - we discussed psa purpose and implications / etiologies of elevation. Discussed both benign and pca etiologies. An elevated psa Given grossly elevated psa without obvious cause, recommend trus pnb. An MRI is not recommended at this time. We discussed the risks, benefits and convalescence of prostate biopsy procedure.  - after discussion, patient is amenable to above plan of care.  - also taking cialis per pcp management without s/e.        Orders Placed This Encounter   • SERVICE TO SURGERY SCHEDULING     TRUS PNB  Dx: elevated psa   Abx: protocol - NKDA     Referral Priority:   Routine     Referral Type:   Surgery & Global Follow-up     Number of Visits Requested:   1     Expiration Date:   12/11/2020          ____________________________________________________________      Scribe Signature:  I, Paulina Cuenca, personally scribed the services dictated to me by Anton Shoemaker M.D. in this documentation.    Scribe Attestation:  The documentation recorded by the scribe accurately reflects the service I personally performed and the decision made by me, Anton Shoemaker MD.

## 2021-12-13 NOTE — PLAN OF CARE
Problem: Infection:  Goal: Will remain free from infection  Description: Will remain free from infection  12/13/2021 0939 by Antione Rai RN  Outcome: Ongoing  12/13/2021 0220 by Jenaro Nicolas RN  Outcome: Ongoing     Problem: Safety:  Goal: Free from accidental physical injury  Description: Free from accidental physical injury  Outcome: Ongoing  Goal: Free from intentional harm  Description: Free from intentional harm  Outcome: Ongoing     Problem: Daily Care:  Goal: Daily care needs are met  Description: Daily care needs are met  Outcome: Ongoing     Problem: Pain:  Goal: Patient's pain/discomfort is manageable  Description: Patient's pain/discomfort is manageable  12/13/2021 0939 by Antione Rai RN  Outcome: Ongoing  12/13/2021 0220 by Jenaro Nicolas RN  Outcome: Ongoing     Problem: Skin Integrity:  Goal: Skin integrity will stabilize  Description: Skin integrity will stabilize  12/13/2021 0939 by Antione Rai RN  Outcome: Ongoing  12/13/2021 0220 by Jenaro Nicolas RN  Outcome: Ongoing     Problem: Discharge Planning:  Goal: Patients continuum of care needs are met  Description: Patients continuum of care needs are met  Outcome: Ongoing     Problem: Nutrition  Goal: Optimal nutrition therapy  Outcome: Ongoing     Problem:  Bowel/Gastric:  Goal: Control of bowel function will improve  Description: Control of bowel function will improve  12/13/2021 0939 by Antione Rai RN  Outcome: Ongoing  12/13/2021 0220 by Jenaro Nicolas RN  Outcome: Ongoing  Goal: Ability to achieve a regular elimination pattern will improve  Description: Ability to achieve a regular elimination pattern will improve  12/13/2021 0939 by Antione Rai RN  Outcome: Ongoing  12/13/2021 0220 by Jenaro Nicolas RN  Outcome: Ongoing     Problem: Nutritional:  Goal: Ability to follow a diet with enough fiber (20 to 30 grams) for normal bowel function will improve  Description: Ability to follow a diet with enough fiber (20 to 30 grams) for normal bowel function will improve  Outcome: Ongoing     Problem: Skin Integrity:  Goal: Risk for impaired skin integrity will decrease  Description: Risk for impaired skin integrity will decrease  Outcome: Ongoing

## 2021-12-13 NOTE — PROGRESS NOTES
Physician Progress Note      PATIENT:               Blanche Peterson  CSN #:                  [id number]  :                       1955  ADMIT DATE:       2021 12:37 PM  [address] DATE:  RESPONDING  PROVIDER #:        Eva PETIT DO          QUERY TEXT:    Pt admitted with sigmoid diverticulitis. Noted documentation of severe   malnutrition by dietary consultant on 12/10. If possible, please document in   progress notes and discharge summary:    The medical record reflects the following:  Risk Factors: diverticular disease, poor appetite with unintentional weight   loss  Clinical Indicators: 12/10 Per dietician consult note:  pt meets criteria for   severe malnutrition in the setting of chronic illness based on Energy Intake:    7 - 75% or less estimated energy requirements for 1 month or longer, Weight   Loss:  7 - Greater than 10% over 6 months  (70lbs in 10 months)  Treatment: monitoring intake, oral supplements 3x/day  Options provided:  -- Severe protein calorie malnutrition confirmed present on admission  -- Other - I will add my own diagnosis  -- Disagree - Not applicable / Not valid  -- Disagree - Clinically unable to determine / Unknown  -- Refer to Clinical Documentation Reviewer    PROVIDER RESPONSE TEXT:    The diagnosis of Severe protein calorie malnutrition was confirmed as present   on admission.     Query created by: Clovis Hinojosa on 2021 7:42 AM      Electronically signed by:  Elvira Denis DO 2021 11:33 AM

## 2021-12-13 NOTE — FLOWSHEET NOTE
[facility]  PROGRESS NOTE    Room # 2111/2111-01   Name: Fazal Swenson              Reason for visit: Routine    I visited the patient and significant other. Admit Date & Time: 12/9/2021 12:37 PM    Assessment:  Fazal Swenson is a 73 y.o. female in the hospital because \"surgery. \" Upon entering the room patient was sitting up in bed, had just finished breakfast, significant other at bedside, nurse administering medication. Intervention:  I introduced myself and my title as  I offered space for patient and significant other  to express feelings, needs, and concerns and provided a ministry presence. Patient was in hospital a few weeks ago. Patient expressed hope for outcome and looking forward to going home, probably tomorrow. In good spirits. Laughing. Outcome:  Gave patient's significant other a \"Prayer for Healing Card\" and offered words of encouragement. Plan:  Chaplains will remain available to offer spiritual and emotional support as needed. Electronically signed by Carmita Hoffman on 12/13/2021 at 9:50 AM.  Jason         12/13/21 6100   Encounter Summary   Services provided to: Patient and family together   Referral/Consult From: [facility] Significant other   Contact Druze No   Continue Visiting   (12/13/21)   Complexity of Encounter Low   Length of Encounter 15 minutes   Spiritual Assessment Completed Yes   Routine   Type Initial   Assessment Calm;  Approachable; Coping   Intervention Active listening; Explored coping resources; Nurtured hope; Sustaining presence/ Ministry of presence   Outcome Expressed gratitude; Encouraged

## 2021-12-13 NOTE — PROGRESS NOTES
[facility]  Follow Up      NAME:  Quincy Thrasher  MEDICAL RECORD NUMBER:  [id number]  AGE: 73 y.o. GENDER: female  : 1955  TODAY'S DATE:  2021        Ostomy education reviewed today. Time was spent with the patient for education and support and encouragement. The patient believes that she will be discharged tomorrow. A plan was made to visit in the morning for a pouch change prior to  discharge. Will also plan an outpatient educational appointment in the ostomy clinic 2 weeks post discharge.     Nazario Rosa MSN RN, CWS, [facility] [address]  Wound, Ostomy, and Continence Nursing  [phone]

## 2021-12-13 NOTE — PROGRESS NOTES
Progress note  [facility]    Adult Hospitalist      Name: Jodi Yap  MRN: [id number]     Acct: [de-identified]  Room: 2111/2111-01    Admit Date: 12/9/2021 12:37 PM  PCP: Tiago Echols MD    Primary Problem  Active Problems:    S/P colectomy    Diverticular disease    Severe malnutrition ([address]  Resolved Problems:    * No resolved hospital problems. *        Assesment:     · Sigmoid bowel resection dated 12/9/2021   · Descending colon colostomy dated 12/9/2021   · Recurrent sigmoid diverticulitis  · Diabetes mellitus type 2 - HbA1C 6.7  · Mixed hyperlipidemia  · Overweight  · Hearing impairment        Plan:     · Patient admitted to [facility]  · Monitor vitals closely  · Keep SPO2 above 90%  · I's and O's  · IV fluids  · Pain control  · Antiemetics as needed  · Up in chair  · Ambulate  · Flagyl, Omnicef p.o. per ID  · Crestor resumed  · Metformin resumed since renal function stable   · Accu-Cheks before meals and at bedtime  · Insulin sliding scale  · Hypoglycemia protocol  · If blood glucose remains elevated, will start low-dose Lantus to assist better healing of wound  · CBC, BMP  · ID on board  · DVT and GI prophylaxis. Chief Complaint:     No chief complaint on file. Medical management    History of Present Illness:        Patient seen and examined at bedside  No acute events reported. Complaining of some pain around the ostomy site. Denies chest pain, dyspnea or orthopnea  Denies vomiting, fever or chills  Denies rash, neck pain or back pain  Denies headache, joint swelling    Initial HPI  Jodi Found is a 73 y.o.  female who presents with No chief complaint on file. Patient admitted after undergoing sigmoid colectomy with colostomy without incident. Patient says pain control has improved. She has been allowed clear liquid diet now and she was able to sit up in chair and ambulate. Patient denies any chest pain, dyspnea or orthopnea.   Denies any headache, photophobia or no history of drug use. Family History:     History reviewed. No pertinent family history. Physical Exam:     Vitals:  BP 98/60   Pulse 85   Temp 98.1 °F (36.7 °C) (Oral)   Resp 18   Ht 5' 8\" (1.727 m)   Wt 200 lb (90.7 kg)   SpO2 99%   BMI 30.41 kg/m²   Temp (24hrs), Av.3 °F (36.8 °C), Min:98 °F (36.7 °C), Max:98.8 °F (37.1 °C)      General appearance - alert, well appearing, and in no acute distress  Mental status - oriented to person, place, and time with normal affect  Head - normocephalic and atraumatic  Eyes - extraocular eye movements intact, conjunctiva clear  Ears - hearing appears to be intact  Nose - no drainage noted  Mouth - mucous membranes moist  Neck - supple, no carotid bruits, thyroid not palpable  Chest - clear to auscultation, normal effort  Heart - normal rate, regular rhythm, no murmur  Abdomen - soft, tender, nondistended, bowel sounds present, no masses, flatus in  colostomy.   No hepatomegaly or splenomegaly  Neurological - normal speech, no focal findings or movement disorder noted, cranial nerves II through XII grossly intact  Extremities - peripheral pulses palpable, no pedal edema or calf pain with palpation  Skin - no gross lesions, rashes, or induration noted        Data:     Labs:    Hematology:  Recent Labs     21  0447 21  0658 21  0635   WBC 8.1 12.9* 12.5*   RBC 3.54* 3.42* 3.53*   HGB 9.4* 8.9* 9.2*   HCT 30.6* 29.1* 30.9*   MCV 86.4 85.1 87.5   MCH 26.6 26.0 26.1   MCHC 30.7 30.6 29.8   RDW 15.8* 15.9* 16.0*    402 421   MPV 9.0 8.6 8.6     Chemistry:  Recent Labs     21  0447 21  0658 21  0635    133* 135   K 4.1 4.0 4.2    100 101   CO2 26 23 23   GLUCOSE 209* 218* 160*   BUN 7* 4* 4*   CREATININE 0.49* 0.50 0.51   ANIONGAP 6* 10 11   LABGLOM >60 >60 >60   GFRAA >60 >60 >60   CALCIUM 8.1* 8.2* 8.4*     Recent Labs     21  0447 21  0615 21  0658 21  1148 21  1620 12/12/21 2052 12/13/21  0629 12/13/21  1206   LABA1C   < >  --  6.7*  --   --   --   --   --    POCGLU  --  226*  --  132* 152* 122* 150* 112*    < > = values in this interval not displayed. Lab Results   Component Value Date    INR 1.3 12/09/2021    INR 1.3 10/26/2021    PROTIME 16.3 (H) 12/09/2021    PROTIME 16.0 (H) 10/26/2021       Lab Results   Component Value Date/Time    SPECIAL NOT REPORTED 10/26/2021 07:30 AM     Lab Results   Component Value Date/Time    CULTURE PATIENT DISCHARGED BEFORE SAMPLE COLLECTED 10/26/2021 07:30 AM       No results found for: POCPH, PHART, PH, POCPCO2, YXJ6RPC, PCO2, POCPO2, PO2ART, PO2, POCHCO3, FNM9GPS, HCO3, NBEA, PBEA, BEART, BE, THGBART, THB, NWV5ZVU, GUCV0VBR, R9TQVZFE, O2SAT, FIO2    Radiology:    No results found. All radiological studies reviewed                Code Status:  Full Code    Electronically signed by Dionte Early MD on 12/13/2021 at 1:08 PM     Copy sent to Dr. Tiago Echols MD    This note was created with the assistance of a speech-recognition program.  Although the intention is to generate a document that actually reflects the content of the visit, no guarantees can be provided that every mistake has been identified and corrected by editing. Note was updated later by me after  physical examination and  completion of the assessment.

## 2021-12-13 NOTE — PLAN OF CARE
Problem: Infection:  Goal: Will remain free from infection  Description: Will remain free from infection  Outcome: Ongoing     Problem: Pain:  Goal: Patient's pain/discomfort is manageable  Description: Patient's pain/discomfort is manageable  Outcome: Ongoing     Problem: Skin Integrity:  Goal: Skin integrity will stabilize  Description: Skin integrity will stabilize  Outcome: Ongoing     Problem: Bowel/Gastric:  Goal: Control of bowel function will improve  Description: Control of bowel function will improve  Outcome: Ongoing     Problem:  Bowel/Gastric:  Goal: Ability to achieve a regular elimination pattern will improve  Description: Ability to achieve a regular elimination pattern will improve  Outcome: Ongoing

## 2021-12-13 NOTE — PROGRESS NOTES
General Surgery:  Daily Progress Note             PATIENT NAME: Tom Hadley     TODAY'S DATE: 12/13/2021, 5:29 AM    SUBJECTIVE:     Patient seen and chart reviewed. Ostomy with stool output noted. Mild nausea yesterday but no emesis, it is directly associated with the flagyl. Pt has refused the flagyl twice now due to significant nausea and GI irritability. DELIO 12ml    Denies fever or chills  Denies SOB or cough  Denies palpitations or CP  +abdominal pain, +nausea, no vomiting, or diarrhea    OBJECTIVE:   VITALS:  /71   Pulse 90   Temp 98.8 °F (37.1 °C) (Oral)   Resp 18   Ht 5' 8\" (1.727 m)   Wt 190 lb 14.4 oz (86.6 kg)   SpO2 99%   BMI 29.03 kg/m²      INTAKE/OUTPUT:      Intake/Output Summary (Last 24 hours) at 12/13/2021 0529  Last data filed at 12/12/2021 1825  Gross per 24 hour   Intake --   Output 10 ml   Net -10 ml       PHYSICAL EXAM:  General Appearance: awake, alert, oriented, in no acute distress  HEENT:  Normocephalic, atraumatic, mucus membranes moist   Heart: Heart regular rate and rhythm  Lungs: Unlabored  Abdomen: Soft, appropriately ttp, midline wound with packing in place, DELIO with serosanguineous output, LLQ ostomy pink and patent, stool noted in bag  Extremities: No cyanosis, pitting edema, rashes noted. Skin: Skin color, texture, turgor normal. No rashes or lesions.     Data:  CBC with Differential:    Lab Results   Component Value Date    WBC 12.9 12/12/2021    RBC 3.42 12/12/2021    HGB 8.9 12/12/2021    HCT 29.1 12/12/2021     12/12/2021    MCV 85.1 12/12/2021    MCH 26.0 12/12/2021    MCHC 30.6 12/12/2021    RDW 15.9 12/12/2021    LYMPHOPCT 10 12/12/2021    MONOPCT 10 12/12/2021    BASOPCT 0 12/12/2021    MONOSABS 1.22 12/12/2021    LYMPHSABS 1.28 12/12/2021    EOSABS <0.03 12/12/2021    BASOSABS 0.03 12/12/2021    DIFFTYPE NOT REPORTED 12/12/2021     BMP:    Lab Results   Component Value Date     12/12/2021    K 4.0 12/12/2021     12/12/2021    CO2 23 12/12/2021    BUN 4 12/12/2021    LABALBU 2.9 10/25/2021    CREATININE 0.50 12/12/2021    CALCIUM 8.2 12/12/2021    GFRAA >60 12/12/2021    LABGLOM >60 12/12/2021    GLUCOSE 218 12/12/2021       ASSESSMENT:  Active Hospital Problems    Diagnosis Date Noted    Severe malnutrition ([facility] [E43] 12/10/2021    S/P colectomy [Z90.49] 12/09/2021    Diverticular disease [K57.90] 12/09/2021       73 y.o. female with chronic diverticulitis  - s/p 12/9/21 Manda's procedure    Plan:  -Diet: Adv to low fiber diet  -Analgesia:  Tylenol, roxicodone, prn morphine  -GI prophylaxis: protonix  -DVT prophylaxis:  lovenox  -Activity:  Continue to encourage ambulation/activity w/ PT/OT  -Continue to encourage incentive spirometry  -Packing changes to midline  -ID consulted: pt not tolerating flagyl, transitioned to oral Augmentin   -Wound ostomy eval and treat for new descending colostomy  - dispo planning next 24 hrs     Electronically signed by Raul Cho DO  on 12/13/2021 at 5:29 AM

## 2021-12-14 VITALS
BODY MASS INDEX: 30.31 KG/M2 | RESPIRATION RATE: 16 BRPM | HEIGHT: 68 IN | WEIGHT: 200 LBS | DIASTOLIC BLOOD PRESSURE: 62 MMHG | TEMPERATURE: 98.8 F | SYSTOLIC BLOOD PRESSURE: 118 MMHG | HEART RATE: 79 BPM | OXYGEN SATURATION: 98 %

## 2021-12-14 LAB
ABSOLUTE EOS #: 0.2 K/UL (ref 0–0.44)
ABSOLUTE IMMATURE GRANULOCYTE: 0.05 K/UL (ref 0–0.3)
ABSOLUTE LYMPH #: 1.26 K/UL (ref 1.1–3.7)
ABSOLUTE MONO #: 0.91 K/UL (ref 0.1–1.2)
BASOPHILS # BLD: 0 % (ref 0–2)
BASOPHILS ABSOLUTE: <0.03 K/UL (ref 0–0.2)
DIFFERENTIAL TYPE: ABNORMAL
EOSINOPHILS RELATIVE PERCENT: 2 % (ref 1–4)
GLUCOSE BLD-MCNC: 118 MG/DL (ref 65–105)
GLUCOSE BLD-MCNC: 140 MG/DL (ref 65–105)
HCT VFR BLD CALC: 29.5 % (ref 36.3–47.1)
HEMOGLOBIN: 8.9 G/DL (ref 11.9–15.1)
IMMATURE GRANULOCYTES: 1 %
LYMPHOCYTES # BLD: 13 % (ref 24–43)
MCH RBC QN AUTO: 26.3 PG (ref 25.2–33.5)
MCHC RBC AUTO-ENTMCNC: 30.2 G/DL (ref 28.4–34.8)
MCV RBC AUTO: 87 FL (ref 82.6–102.9)
MONOCYTES # BLD: 9 % (ref 3–12)
NRBC AUTOMATED: 0 PER 100 WBC
PDW BLD-RTO: 16.1 % (ref 11.8–14.4)
PLATELET # BLD: 423 K/UL (ref 138–453)
PLATELET ESTIMATE: ABNORMAL
PMV BLD AUTO: 8.5 FL (ref 8.1–13.5)
RBC # BLD: 3.39 M/UL (ref 3.95–5.11)
RBC # BLD: ABNORMAL 10*6/UL
SEG NEUTROPHILS: 75 % (ref 36–65)
SEGMENTED NEUTROPHILS ABSOLUTE COUNT: 7.5 K/UL (ref 1.5–8.1)
WBC # BLD: 9.9 K/UL (ref 3.5–11.3)
WBC # BLD: ABNORMAL 10*3/UL

## 2021-12-14 PROCEDURE — 6370000000 HC RX 637 (ALT 250 FOR IP): Performed by: FAMILY MEDICINE

## 2021-12-14 PROCEDURE — 6370000000 HC RX 637 (ALT 250 FOR IP): Performed by: STUDENT IN AN ORGANIZED HEALTH CARE EDUCATION/TRAINING PROGRAM

## 2021-12-14 PROCEDURE — 82947 ASSAY GLUCOSE BLOOD QUANT: CPT

## 2021-12-14 PROCEDURE — 6360000002 HC RX W HCPCS: Performed by: STUDENT IN AN ORGANIZED HEALTH CARE EDUCATION/TRAINING PROGRAM

## 2021-12-14 PROCEDURE — 99213 OFFICE O/P EST LOW 20 MIN: CPT

## 2021-12-14 PROCEDURE — 85025 COMPLETE CBC W/AUTO DIFF WBC: CPT

## 2021-12-14 PROCEDURE — 36415 COLL VENOUS BLD VENIPUNCTURE: CPT

## 2021-12-14 RX ORDER — HYDROCODONE BITARTRATE AND ACETAMINOPHEN 5; 325 MG/1; MG/1
1 TABLET ORAL EVERY 6 HOURS PRN
Qty: 28 TABLET | Refills: 0 | Status: SHIPPED | OUTPATIENT
Start: 2021-12-14 | End: 2021-12-21

## 2021-12-14 RX ORDER — ONDANSETRON 4 MG/1
4 TABLET, FILM COATED ORAL EVERY 8 HOURS PRN
Qty: 30 TABLET | Refills: 0 | Status: SHIPPED | OUTPATIENT
Start: 2021-12-14 | End: 2022-04-29

## 2021-12-14 RX ORDER — DOCUSATE SODIUM 100 MG/1
100 CAPSULE, LIQUID FILLED ORAL 2 TIMES DAILY PRN
Qty: 40 CAPSULE | Refills: 0 | Status: SHIPPED | OUTPATIENT
Start: 2021-12-14

## 2021-12-14 RX ORDER — AMOXICILLIN AND CLAVULANATE POTASSIUM 875; 125 MG/1; MG/1
1 TABLET, FILM COATED ORAL EVERY 12 HOURS SCHEDULED
Qty: 20 TABLET | Refills: 0 | Status: SHIPPED | OUTPATIENT
Start: 2021-12-14 | End: 2021-12-24

## 2021-12-14 RX ADMIN — AMOXICILLIN AND CLAVULANATE POTASSIUM 1 TABLET: 875; 125 TABLET, FILM COATED ORAL at 07:54

## 2021-12-14 RX ADMIN — ACETAMINOPHEN 1000 MG: 500 TABLET ORAL at 06:41

## 2021-12-14 RX ADMIN — INSULIN LISPRO 2 UNITS: 100 INJECTION, SOLUTION INTRAVENOUS; SUBCUTANEOUS at 11:29

## 2021-12-14 RX ADMIN — ENOXAPARIN SODIUM 40 MG: 100 INJECTION SUBCUTANEOUS at 07:54

## 2021-12-14 RX ADMIN — ACETAMINOPHEN 1000 MG: 500 TABLET ORAL at 13:12

## 2021-12-14 RX ADMIN — METFORMIN HYDROCHLORIDE 500 MG: 500 TABLET, EXTENDED RELEASE ORAL at 07:54

## 2021-12-14 ASSESSMENT — PAIN DESCRIPTION - PROGRESSION
CLINICAL_PROGRESSION: GRADUALLY IMPROVING
CLINICAL_PROGRESSION: GRADUALLY IMPROVING

## 2021-12-14 ASSESSMENT — PAIN DESCRIPTION - ORIENTATION
ORIENTATION: LOWER
ORIENTATION: LOWER

## 2021-12-14 ASSESSMENT — PAIN DESCRIPTION - FREQUENCY
FREQUENCY: INTERMITTENT
FREQUENCY: INTERMITTENT

## 2021-12-14 ASSESSMENT — PAIN DESCRIPTION - ONSET: ONSET: ON-GOING

## 2021-12-14 ASSESSMENT — PAIN DESCRIPTION - PAIN TYPE
TYPE: SURGICAL PAIN
TYPE: SURGICAL PAIN

## 2021-12-14 ASSESSMENT — PAIN DESCRIPTION - LOCATION
LOCATION: ABDOMEN
LOCATION: ABDOMEN

## 2021-12-14 ASSESSMENT — PAIN - FUNCTIONAL ASSESSMENT: PAIN_FUNCTIONAL_ASSESSMENT: ACTIVITIES ARE NOT PREVENTED

## 2021-12-14 ASSESSMENT — PAIN DESCRIPTION - DESCRIPTORS
DESCRIPTORS: ACHING;DISCOMFORT
DESCRIPTORS: ACHING

## 2021-12-14 ASSESSMENT — PAIN SCALES - GENERAL
PAINLEVEL_OUTOF10: 6
PAINLEVEL_OUTOF10: 5

## 2021-12-14 NOTE — PROGRESS NOTES
[facility]  Follow Up      NAME:  Omar Welch  MEDICAL RECORD NUMBER:  [id number]  AGE: 73 y.o. GENDER: female  : 1955  TODAY'S DATE:  2021      Extensive time spent with the patient and her  for ostomy care and education including review of everything learned thus far. The pouch was emptied this visit and changed. The patient does have a difficult stoma as it is in a skin fold. The pouch was found to be leaking under the flange when it was changed. A convex pouch and moldable putty was used to assist the seal. Will plan to see the patient in the ostomy clinic at [facility] in 1 to 2 weeks after discharge. Pt will have home care nursing. Supplies provided and DECLAN updated.      Ostomy size 09v25oz    [employer] supplies used:   Pouch ref # [id number] # [id number]  Putty # [id number]      Joshua CEDENO RN, CWS, [facility] [address]  Wound, Ostomy, and Continence Nursing  [phone]\

## 2021-12-14 NOTE — DISCHARGE SUMMARY
Surgery Discharge Summary     Patient Identification  Vincent Lucio is a 73 y.o. female. :  1955  Admit Date:  2021    Discharge date:   2021  4:40 PM                                   Disposition: home    Discharge Diagnoses:   Patient Active Problem List   Diagnosis    Incisional hernia    Intra-abdominal abscess ([facility]    Diverticulitis of intestine with abscess    Mild malnutrition (HCC)    S/P colectomy    Diverticular disease    Severe malnutrition ([facility]       Condition on discharge: stable    Consults: FM, ID, wound ostomy     Surgery: BOWEL RESECTION SIGMOID, with descending colon colostomy    Patient Instructions: Activity: no heavy lifting, pushing, pulling for 6 weeks, no driving for 2 weeks or while on analgesics  Diet: As tolerated  Follow-up with Dr Smith Poag 5-7 days. See pre-printed instructions in chart and given to patient upon discharge. Discharge Medications:       HPI and Hospital Course:   73 y.o. female presented on 2021 for [place] [address], with descending colon colostomy. Hospital course was unremarkable. On day of discharge pt was tolerating low fiber diet, pain controlled with oral medications and ambulating without difficulty. Pt was discharged home on PO Augmentin.      Electronically signed by Maria Guadalupe Durham DO on 12/15/2021 at 5:39 AM

## 2021-12-14 NOTE — PROGRESS NOTES
Nutrition Assessment     Type and Reason for Visit: Reassess    Nutrition Recommendations/Plan:   1. Continue current Regular;Low Fiber diet  2. Suggest modifying ONS to Ensure HP (<1 gm fiber) 2-3x/d as tolerated  3. Monitor po intakes, ONS acceptance, diet tolerance, and labs    Nutrition Assessment:  S/p bowel resection sigmoid with descending colon colostomy on 12/9. Patient diet advanced to Low Fiber. She is tolerating diet. PO intakes: 50%. No N/V. Active bowel sounds. She is planned to discharge home today. Recommend continuing low fiber diet. Suggest modifying ONS to Ensure HP ( <1 gm fiber) 2-3x/d as tolerated. Will monitor po intakes, diet tolerance, ONS acceptance, and labs. Malnutrition Assessment:  Malnutrition Status: Severe malnutrition    Estimated Daily Nutrient Needs:  Energy (kcal): 5317-6327 kcal (22-25 kcal/kg); Weight Used for Energy Requirements:  Current     Protein (g):  gm of protein (1.3-1.6 gm/kg); Weight Used for Protein Requirements:  Ideal          Nutrition Related Findings: Edema: BLE trace. Active bowel sounds. S/P Bowel resection sigmoid, with descending colon colostomy (12/9). Labs reviewed- WBC 12.5 (H)      Current Nutrition Therapies:    ADULT ORAL NUTRITION SUPPLEMENT; Breakfast, Dinner, Lunch; Clear Liquid Oral Supplement  ADULT DIET; Regular; Low Fiber    Anthropometric Measures:  · Height: 5' 8\" (172.7 cm)  · Current Body Wt: 200 lb (90.7 kg)   · BMI: 30.4    Nutrition Diagnosis:   · Severe malnutrition related to inadequate protein-energy intake as evidenced by intake 26-50%, weight loss greater than or equal to 10% in 6 months    Nutrition Interventions:   Food and/or Nutrient Delivery:  Continue Current Diet, Modify Oral Nutrition Supplement  Nutrition Education/Counseling:  Education not indicated   Coordination of Nutrition Care:  Continue to monitor while inpatient    Goals:   P.O intakes are greater than 75% at meals       Nutrition Monitoring and

## 2021-12-14 NOTE — PROGRESS NOTES
Physician Progress Note      PATIENT:               Chanell Martinez  CSN #:                  [id number]  :                       1955  ADMIT DATE:       2021 12:37 PM  [address] DATE:  RESPONDING  PROVIDER #:        Bhumika PETIT DO          QUERY TEXT:    Patient admitted with diverticulitis, noted to have evidence of perforation   and abscess on pathology report. If possible, please document in progress   notes and discharge summary if you are evaluating and/or treating any of the   following: The medical record reflects the following:  Risk Factors: diverticulitis  Clinical Indicators: 12/10 ID consult note: \"Diverticulitis of the large   intestine with associated abscess; Recent CT scan still had significant   inflammatory changes in the descending colon, sigmoid colon, and rectosigmoid   colon with air-fluid levels in the retroperitoneal space with areas of   involvement including left psoas muscle, left iliac is muscle;\"  Surgical   pathology report:  ruptured diverticulitis with mural abscess and serositis;   Gross description of sigmoid colon specimen: \" Within the wall and  adjacent fat, there is cavitation consistent with perforation. \"   T spike   to 38.2 with ;  Treatment: surgical resection, ID consult, antibiotics  Options provided:  -- Diverticulitis with perforation and abscess  -- Pathology findings not clinically significant  -- Other - I will add my own diagnosis  -- Disagree - Not applicable / Not valid  -- Disagree - Clinically unable to determine / Unknown  -- Refer to Clinical Documentation Reviewer    PROVIDER RESPONSE TEXT:    This patient has diverticulitis with perforation and abscess.     Query created by: Estrellita Cheadle on 2021 9:16 AM      Electronically signed by:  Cleo Porter DO 2021 12:44 PM

## 2021-12-14 NOTE — PLAN OF CARE
Problem: Infection:  Goal: Will remain free from infection  Description: Will remain free from infection  Outcome: Ongoing  Note: Ongoing     Problem: Safety:  Goal: Free from accidental physical injury  Description: Free from accidental physical injury  Outcome: Ongoing  Note: Ongoing     Problem: Daily Care:  Goal: Daily care needs are met  Description: Daily care needs are met  Outcome: Ongoing  Note: Ongoing     Problem: Pain:  Goal: Patient's pain/discomfort is manageable  Description: Patient's pain/discomfort is manageable  Outcome: Ongoing  Note: Ongoing     Problem: Skin Integrity:  Goal: Skin integrity will stabilize  Description: Skin integrity will stabilize  Outcome: Ongoing  Note: Ongoing     Problem: Discharge Planning:  Goal: Patients continuum of care needs are met  Description: Patients continuum of care needs are met  Outcome: Ongoing  Note: Ongoing     Problem:  Bowel/Gastric:  Goal: Control of bowel function will improve  Description: Control of bowel function will improve  Outcome: Ongoing  Note: Ongoing     Problem: Nutritional:  Goal: Ability to follow a diet with enough fiber (20 to 30 grams) for normal bowel function will improve  Description: Ability to follow a diet with enough fiber (20 to 30 grams) for normal bowel function will improve  Outcome: Ongoing  Note: Ongoing

## 2021-12-14 NOTE — PROGRESS NOTES
General Surgery:  Daily Progress Note             PATIENT NAME: Dai Quintanilla     TODAY'S DATE: 12/14/2021, 6:56 AM    SUBJECTIVE:     Patient seen and chart reviewed. Ostomy with stool output noted. Tolerating low fiber diet. Learning how to change stoma appliance. Discussed with likely d/c later today if other services agree, put understands. DELIO 5ml    Denies fever or chills  Denies SOB or cough  Denies palpitations or CP  +abdominal pain, +nausea, no vomiting, or diarrhea    OBJECTIVE:   VITALS:  /62   Pulse 79   Temp 98.8 °F (37.1 °C) (Oral)   Resp 16   Ht 5' 8\" (1.727 m)   Wt 200 lb (90.7 kg)   SpO2 98%   BMI 30.41 kg/m²      INTAKE/OUTPUT:      Intake/Output Summary (Last 24 hours) at 12/14/2021 0656  Last data filed at 12/14/2021 0630  Gross per 24 hour   Intake --   Output 1930 ml   Net -1930 ml       PHYSICAL EXAM:  General Appearance: awake, alert, oriented, in no acute distress  HEENT:  Normocephalic, atraumatic, mucus membranes moist   Heart: Heart regular rate and rhythm  Lungs: Unlabored  Abdomen: Soft, appropriately ttp, midline wound with packing in place, DELIO with serosanguineous output, LLQ ostomy pink and patent, stool noted in bag  Extremities: No cyanosis, pitting edema, rashes noted. Skin: Skin color, texture, turgor normal. No rashes or lesions.     Data:  CBC with Differential:    Lab Results   Component Value Date    WBC 12.5 12/13/2021    RBC 3.53 12/13/2021    HGB 9.2 12/13/2021    HCT 30.9 12/13/2021     12/13/2021    MCV 87.5 12/13/2021    MCH 26.1 12/13/2021    MCHC 29.8 12/13/2021    RDW 16.0 12/13/2021    LYMPHOPCT 11 12/13/2021    MONOPCT 9 12/13/2021    BASOPCT 0 12/13/2021    MONOSABS 1.17 12/13/2021    LYMPHSABS 1.35 12/13/2021    EOSABS 0.06 12/13/2021    BASOSABS 0.03 12/13/2021    DIFFTYPE NOT REPORTED 12/13/2021     BMP:    Lab Results   Component Value Date     12/13/2021    K 4.2 12/13/2021     12/13/2021    CO2 23 12/13/2021    BUN 4 12/13/2021    LABALBU 2.9 10/25/2021    CREATININE 0.51 12/13/2021    CALCIUM 8.4 12/13/2021    GFRAA >60 12/13/2021    LABGLOM >60 12/13/2021    GLUCOSE 160 12/13/2021       ASSESSMENT:  Active Hospital Problems    Diagnosis Date Noted    Severe malnutrition ([facility] [address] [E43] 12/10/2021    S/P colectomy [Z90.49] 12/09/2021    Diverticular disease [K57.90] 12/09/2021     73 y.o. female with chronic diverticulitis  - s/p 12/9/21 Manda's procedure    Plan:  -Diet: Cont low fiber diet  -Analgesia:  Tylenol, roxicodone  -GI prophylaxis: protonix; DVT prophylaxis:  lovenox  -Activity:  Continue to encourage ambulation/activity w/ PT/OT  -Continue to encourage incentive spirometry  -ID consulted: appreciate input   -Wound ostomy eval and treat for new descending colostomy  - dispo planning today if WBC improves/remains same and ok per ID   - Pt to f/u with Dr. Tulio August as out pt     Electronically signed by Thea Manzo DO  on 12/14/2021 at 6:56 AM

## 2021-12-14 NOTE — PROGRESS NOTES
Progress note  [facility]    Adult Hospitalist      Name: Breonna Pardo  MRN: [id number]     Acct: [de-identified]  Room: 2111/2111-01    Admit Date: 12/9/2021 12:37 PM  PCP: Shelley Givens MD    Primary Problem  Active Problems:    S/P colectomy    Diverticular disease    Severe malnutrition ([facility] [address]  Resolved Problems:    * No resolved hospital problems. *        Assesment:     · Sigmoid bowel resection dated 12/9/2021   · Descending colon colostomy dated 12/9/2021   · Recurrent sigmoid diverticulitis  · Diabetes mellitus type 2 - HbA1C 6.7  · Mixed hyperlipidemia  · Overweight  · Hearing impairment        Plan:     · Patient admitted to [facility]  · Monitor vitals closely  · Keep SPO2 above 90%  · I's and O's  · IV fluids  · Pain control  · Antiemetics as needed  · Up in chair  · Ambulate  · Flagyl, Omnicef p.o. per ID  · Crestor resumed  · Metformin resumed since renal function stable   · Accu-Cheks before meals and at bedtime  · Insulin sliding scale  · Hypoglycemia protocol  · If blood glucose remains elevated, will start low-dose Lantus to assist better healing of wound  · CBC, BMP  · ID on board  · DVT and GI prophylaxis. No objection to discharge    Chief Complaint:     No chief complaint on file. Medical management    History of Present Illness:        Patient seen and examined at bedside  No acute events reported. Complaining of some pain around the ostomy site. Denies chest pain, dyspnea or orthopnea  Denies vomiting, fever or chills  Denies rash, neck pain or back pain  Denies headache, joint swelling    Initial HPI  Breonna Pardo is a 73 y.o.  female who presents with No chief complaint on file. Patient admitted after undergoing sigmoid colectomy with colostomy without incident. Patient says pain control has improved. She has been allowed clear liquid diet now and she was able to sit up in chair and ambulate. Patient denies any chest pain, dyspnea or orthopnea.   Denies any headache, photophobia or diplopia. Denies any neck pain or back pain. Denies nausea or vomiting. She has not had any flatus or bowel movement yet    I have personally reviewed the past medical history, past surgical history, medications, social history, and family history, and summarized in the note. Review of Systems:     All 10 point system is reviewed and negative otherwise mentioned in HPI. Past Medical History:     Past Medical History:   Diagnosis Date    Arthritis     knee, back    Asthma     Diverticulitis     GERD (gastroesophageal reflux disease)         Past Surgical History:     Past Surgical History:   Procedure Laterality Date    COLONOSCOPY      COLOSTOMY  12/09/2021    By Dr. Luis Cowart      umbilical   [address] [facility]  07/19/2016    incisional with mesh, robotic converted to open    SIGMOID COLECTOMY N/A 12/09/2021    BOWEL RESECTION SIGMOID performed by Marivel Toro MD at [place] [address]          Medications Prior to Admission:       Prior to Admission medications    Medication Sig Start Date End Date Taking? Authorizing Provider   amoxicillin-clavulanate (AUGMENTIN) 875-125 MG per tablet Take 1 tablet by mouth every 12 hours for 10 days 12/14/21 12/24/21 Yes Peggi Eng, DO   HYDROcodone-acetaminophen (NORCO) 5-325 MG per tablet Take 1 tablet by mouth every 6 hours as needed for Pain for up to 7 days.  12/14/21 12/21/21 Yes Peggi Eng, DO   docusate sodium (COLACE) 100 MG capsule Take 1 capsule by mouth 2 times daily as needed for Constipation 12/14/21  Yes Peggi Eng, DO   ondansetron (ZOFRAN) 4 MG tablet Take 1 tablet by mouth every 8 hours as needed for Nausea or Vomiting 12/14/21  Yes Peggi Eng, DO   metFORMIN (GLUCOPHAGE-XR) 500 MG extended release tablet Take 500 mg by mouth 2 times daily (with meals)  10/7/21  Yes Historical Provider, MD   omeprazole (PRILOSEC) 40 MG capsule Take 40 mg by mouth daily as needed    Yes Historical Provider, MD   Multiple Vitamins-Minerals (THERAPEUTIC MULTIVITAMIN-MINERALS) tablet Take 1 tablet by mouth daily   Yes Historical Provider, MD   rosuvastatin (CRESTOR) 5 MG tablet Take 5 mg by mouth nightly    Historical Provider, MD   albuterol sulfate  (90 BASE) MCG/ACT inhaler Inhale 2 puffs into the lungs every 6 hours as needed for Wheezing    Historical Provider, MD        Allergies:       Sulfa antibiotics    Social History:     Tobacco:    reports that she has quit smoking. Her smoking use included cigarettes. She has a 0.75 pack-year smoking history. She has never used smokeless tobacco.  Alcohol:      reports current alcohol use. Drug Use:  reports no history of drug use. Family History:     History reviewed. No pertinent family history. Physical Exam:     Vitals:  /62   Pulse 79   Temp 98.8 °F (37.1 °C) (Oral)   Resp 16   Ht 5' 8\" (1.727 m)   Wt 200 lb (90.7 kg)   SpO2 98%   BMI 30.41 kg/m²   Temp (24hrs), Av °F (37.2 °C), Min:97.9 °F (36.6 °C), Max:100.8 °F (38.2 °C)      General appearance - alert, well appearing, and in no acute distress  Mental status - oriented to person, place, and time with normal affect  Head - normocephalic and atraumatic  Eyes - extraocular eye movements intact, conjunctiva clear  Ears - hearing appears to be intact  Nose - no drainage noted  Mouth - mucous membranes moist  Neck - supple, no carotid bruits, thyroid not palpable  Chest - clear to auscultation, normal effort  Heart - normal rate, regular rhythm, no murmur  Abdomen - soft, tender, nondistended, bowel sounds present, no masses, flatus in  colostomy.   No hepatomegaly or splenomegaly  Neurological - normal speech, no focal findings or movement disorder noted, cranial nerves II through XII grossly intact  Extremities - peripheral pulses palpable, no pedal edema or calf pain with palpation  Skin - no gross lesions, rashes, or induration noted        Data: Labs:    Hematology:  Recent Labs     12/12/21  0658 12/13/21  0635 12/14/21  0644   WBC 12.9* 12.5* 9.9   RBC 3.42* 3.53* 3.39*   HGB 8.9* 9.2* 8.9*   HCT 29.1* 30.9* 29.5*   MCV 85.1 87.5 87.0   MCH 26.0 26.1 26.3   MCHC 30.6 29.8 30.2   RDW 15.9* 16.0* 16.1*    421 423   MPV 8.6 8.6 8.5     Chemistry:  Recent Labs     12/12/21  0658 12/13/21  0635   * 135   K 4.0 4.2    101   CO2 23 23   GLUCOSE 218* 160*   BUN 4* 4*   CREATININE 0.50 0.51   ANIONGAP 10 11   LABGLOM >60 >60   GFRAA >60 >60   CALCIUM 8.2* 8.4*     Recent Labs     12/12/21  0658 12/12/21  1148 12/13/21  0629 12/13/21  1206 12/13/21  1655 12/13/21  2058 12/14/21  0625 12/14/21  1106   LABA1C 6.7*  --   --   --   --   --   --   --    POCGLU  --    < > 150* 112* 133* 162* 118* 140*    < > = values in this interval not displayed. Lab Results   Component Value Date    INR 1.3 12/09/2021    INR 1.3 10/26/2021    PROTIME 16.3 (H) 12/09/2021    PROTIME 16.0 (H) 10/26/2021       Lab Results   Component Value Date/Time    SPECIAL NOT REPORTED 10/26/2021 07:30 AM     Lab Results   Component Value Date/Time    CULTURE PATIENT DISCHARGED BEFORE SAMPLE COLLECTED 10/26/2021 07:30 AM       No results found for: POCPH, PHART, PH, POCPCO2, OKZ2IVX, PCO2, POCPO2, PO2ART, PO2, POCHCO3, RUQ7LSE, HCO3, NBEA, PBEA, BEART, BE, THGBART, THB, GJW4YCN, GGIW0QXE, G0NDZYTS, O2SAT, FIO2    Radiology:    No results found. All radiological studies reviewed                Code Status:  Full Code    Electronically signed by Dionte Early MD on 12/14/2021 at 4:10 PM     Copy sent to Dr. Tiago Echols MD    This note was created with the assistance of a speech-recognition program.  Although the intention is to generate a document that actually reflects the content of the visit, no guarantees can be provided that every mistake has been identified and corrected by editing.      Note was updated later by me after  physical examination and completion of the assessment.

## 2021-12-14 NOTE — PROGRESS NOTES
Pt discharged home in stable condition  Discharge instructions given  Scripts for Augmentin, Colace, Zofran, and Norco sent with patient. Pt denies having any further questions at this time  Personal items given to patient at discharge  Patient/family state they have everything they were admitted with. Dressing change and drain supplies and hibiclens sent home with patient.

## 2021-12-14 NOTE — PROGRESS NOTES
Patient expressed concern of discharge d/t the colostomy care. THANH Bertrand from wound care. She states that she is planning to meet with the patient and her  to go over ostomy care prior to discharge today.

## 2021-12-14 NOTE — CARE COORDINATION
Pt discharging home today with Ruth. VM left for Chelsea with Ruth to inform of D/C. Post op appointment scheduled with Dr. Vitaly Sandoval 12-22 at [address] [place] [address] in [facility].

## 2021-12-17 NOTE — CARE COORDINATION
Call from [address] [place] [address] who spoke to pt since she has not heard from [facility]. [facility] had accepted pt and contacted Chelsea with [facility] today and they are not in network with [facility].   It was understood pt had Medicare when benefits were run but pt has working spouse which is primary [facility].   Insurance was declined over the weekend and [address] was made unaware prior to  patient discharge. Spoke to Ibis Law with [facility] and they can see pt Saturday. Called pt to inform [facility] will start services tomorrow and she is agreeable. Has had them recently for infusions.

## 2021-12-30 ENCOUNTER — HOSPITAL ENCOUNTER (OUTPATIENT)
Dept: WOUND CARE | Age: 66
Discharge: HOME | End: 2021-12-30
Admitting: EMERGENCY MEDICINE
Payer: COMMERCIAL

## 2021-12-30 VITALS
HEART RATE: 77 BPM | SYSTOLIC BLOOD PRESSURE: 116 MMHG | RESPIRATION RATE: 16 BRPM | DIASTOLIC BLOOD PRESSURE: 75 MMHG | TEMPERATURE: 98 F

## 2021-12-30 PROCEDURE — 99213 OFFICE O/P EST LOW 20 MIN: CPT

## 2021-12-30 NOTE — PROGRESS NOTES
[address] Continence Nurse  Initial Ostomy Clinic Visit Note       NAME:  Everett Billy  MEDICAL RECORD NUMBER:  [id number]  AGE: 73 y.o. GENDER: female  : 1955  TODAY'S DATE:  2021    Subjective:     Reason for Ostomy referral for eval/treatment: follow up post op new ostomy with difficulties    The patient states that she has been experiencing problems with leaking around the stoma and the skin around the stoma is now damaged causing discomfort. Everett Billy is a 73 y.o. female referred by: Inpatient WOC nursing    Type of ostomy: colostomy     Location of ostomy: left mid to lower abdomen    Ostomy history: created 21 due to acute on chronic sigmoid diverticular disease    Patient Goal of Care: improve pouching technique to avoid leakage problems        PAST MEDICAL HISTORY        Diagnosis Date    Arthritis     knee, back    Asthma     Diverticulitis     GERD (gastroesophageal reflux disease)        PAST SURGICAL HISTORY    Past Surgical History:   Procedure Laterality Date    COLONOSCOPY      COLOSTOMY  2021    By Dr. Nadir Tijerina      umbilical   [address] [facility]  2016    incisional with mesh, robotic converted to open    SIGMOID COLECTOMY N/A 2021    BOWEL RESECTION SIGMOID performed by Paty Hutchinson MD at [address] [facility]    History reviewed. No pertinent family history.     SOCIAL HISTORY    Social History     Tobacco Use    Smoking status: Former Smoker     Packs/day: 0.25     Years: 3.00     Pack years: 0.75     Types: Cigarettes    Smokeless tobacco: Never Used    Tobacco comment: quit smoking at age 22   Vaping Use    Vaping Use: Never used   Substance Use Topics    Alcohol use: Yes     Comment: occassionally    Drug use: No       ALLERGIES    Allergies   Allergen Reactions    Sulfa Antibiotics Other (See Comments)     Face got red           Objective:      /75   Pulse 77   Temp 98 °F (36.7 °C) (Tympanic)   Resp 16       LABS    CBC:   Lab Results   Component Value Date    WBC 9.9 12/14/2021    RBC 3.39 12/14/2021    HGB 8.9 12/14/2021     CMP:  Albumin:    Lab Results   Component Value Date    LABALBU 2.9 10/25/2021     PT/INR:    Lab Results   Component Value Date    PROTIME 16.3 12/09/2021    INR 1.3 12/09/2021     HgBA1c:    Lab Results   Component Value Date    LABA1C 6.7 12/12/2021     PTT: No components found for: LABPTT      Assessment:       Patient Active Problem List   Diagnosis Code    Incisional hernia K43.2    Intra-abdominal abscess ([address] K65.1    Diverticulitis of intestine with abscess K57.80    Mild malnutrition (HCC) E44.1    S/P colectomy Z90.49    Diverticular disease K57.90    Severe malnutrition ([address] E43       Patient Active Problem List   Diagnosis    Incisional hernia    Intra-abdominal abscess ([address]    Diverticulitis of intestine with abscess    Mild malnutrition ([address]    S/P colectomy    Diverticular disease    Severe malnutrition ([address]         Ostomy and Peristomal skin: skin all around the stoma is quite inflamed, denuded. See picture. There is a large granuloma on the stoma near the mucocutaneous junction likely from retained suture. This was treated with silver nitrate. Size: 28mm round when pulled taut  Height: retracted  Color: red  Output: thick brown  Creases/Folds: yes- ostomy site goes right through a skin fold. The patient was marked preoperatively, however the surgeon was unable to use the marked site for stoma creation. Stents/Bridges: n/a    The patient has been using a standard one piece pouch despite my recommendation at discharge to use a convex pouch and sending her home with a small supply. The pouch was also cut and protective seal stretched too large to fit the stoma and was exposing a significant amount of peristomal skin. Plan:     Plan of Care:     -Recommend switching back to a convex pouch with an ostomy belt.      -Belt should only be used during the day, especially most active part of her day. We discussed the importance of monitoring closely for pressure injury and how to prevent and treat concerns.     -Detailed discharge instructions provided    -See Discharge Instructions below    -No f/u appt necessary, she was encouraged to call if needed    -The patient's brother was present during this visit    Patient/Caregiver Teaching:  Level of patientunderstanding able to:     [x] Indicates understanding       [] Needs reinforcement  [] Unsuccessful      [x] Verbal Understanding  [] Demonstrated understanding       [] No evidence of learning  [] Refused teaching         [] N/A    ___________________________________________    Discharge Instructions            [address] [facility] and [address] [facility]     Visit Discharge Instructions    DATE- 12/30/2021      HOME CARE [facility]Y- [address] [facility] one piece convex ref# [id number]  Brava protective seal ref# [id number]  Cavilon No sting barrier wipes ref #[id number]  Ostomy belt ref #[id number]  Lubricating deodorant ref #[id number]  Brava strip paste ref# [id number]      OSTOMY INSTRUCTIONS-  Routine ostomy care:  -Cleanse peristomal skin with just water   -Cut barrier to fit stoma with no more than 1/8\" of exposed skin. -Empty the pouch when 1/3 to 1/2 full.  -Change the pouching system twice weekly and prn.  -Our goal is to keep the skin around the stoma intact and to have a dependable pouching system without leaks.  -The skin around the stoma should be intact and appear just like the skin on the opposite side of the abdomen.     -See steps below for pouch placement    1. Have supplies ready and nearby. Cut the pouch slightly off-center to accommodate nearby navel. 2. Hold skin above stoma taut to create a circular stoma         3. Use skin barrier wipe around entire stoma wherever the pouch will stick.  (Use crusting technique with stoma powder only for denuded peristomal skin). 4. Place the protective seal stretched to fit immediately around the stoma. Place two small nubs of the strip paste rolled into small worms on either side of the stoma on top of the seal as pictured. Do not attempt to push into skin much until after the pouch is placed              5. Place the pouch. Use fingers to moosh the pouch into the putties. Sit still for 20 to 30 min. ADDITIONAL INSTRUCTIONS- call for appt as needed      [address] [phone]      If you need medical attention outside of the business hours of the [address] [place] [address] please contact your PCP or go to the nearest emergency room.           Patient Signature:__________________________________________________ DATE__________    Electronically signed by Hugh Simon RN on 12/30/2021 at 10:04 AM          ____________________________________________       Electronically signed by Hugh Simon RN on  12/30/2021 at 11:17 AM

## 2022-01-23 ASSESSMENT — ENCOUNTER SYMPTOMS: ABDOMINAL PAIN: 1

## 2022-04-29 ENCOUNTER — HOSPITAL ENCOUNTER (OUTPATIENT)
Dept: PREADMISSION TESTING | Age: 67
Discharge: HOME | End: 2022-05-03
Payer: COMMERCIAL

## 2022-04-29 VITALS
OXYGEN SATURATION: 99 % | WEIGHT: 190 LBS | TEMPERATURE: 97.7 F | BODY MASS INDEX: 28.79 KG/M2 | SYSTOLIC BLOOD PRESSURE: 122 MMHG | DIASTOLIC BLOOD PRESSURE: 68 MMHG | HEIGHT: 68 IN | HEART RATE: 70 BPM | RESPIRATION RATE: 16 BRPM

## 2022-04-29 LAB
ABO/RH: NORMAL
ANION GAP SERPL CALCULATED.3IONS-SCNC: 11 MMOL/L (ref 9–17)
ANTIBODY SCREEN: NEGATIVE
ARM BAND NUMBER: NORMAL
BUN BLDV-MCNC: 14 MG/DL (ref 8–23)
BUN/CREAT BLD: 20 (ref 9–20)
CALCIUM SERPL-MCNC: 9.3 MG/DL (ref 8.6–10.4)
CHLORIDE BLD-SCNC: 101 MMOL/L (ref 98–107)
CO2: 28 MMOL/L (ref 20–31)
CREAT SERPL-MCNC: 0.7 MG/DL (ref 0.5–0.9)
EKG ATRIAL RATE: 68 BPM
EKG P AXIS: -11 DEGREES
EKG P-R INTERVAL: 148 MS
EKG Q-T INTERVAL: 422 MS
EKG QRS DURATION: 82 MS
EKG QTC CALCULATION (BAZETT): 448 MS
EKG R AXIS: 7 DEGREES
EKG T AXIS: 54 DEGREES
EKG VENTRICULAR RATE: 68 BPM
ESTIMATED AVERAGE GLUCOSE: 120 MG/DL
EXPIRATION DATE: NORMAL
GFR AFRICAN AMERICAN: >60 ML/MIN
GFR NON-AFRICAN AMERICAN: >60 ML/MIN
GFR SERPL CREATININE-BSD FRML MDRD: NORMAL ML/MIN/{1.73_M2}
GLUCOSE BLD-MCNC: 95 MG/DL (ref 70–99)
HBA1C MFR BLD: 5.8 % (ref 4–6)
HCT VFR BLD CALC: 43.9 % (ref 36.3–47.1)
HEMOGLOBIN: 14 G/DL (ref 11.9–15.1)
MCH RBC QN AUTO: 28.2 PG (ref 25.2–33.5)
MCHC RBC AUTO-ENTMCNC: 31.9 G/DL (ref 28.4–34.8)
MCV RBC AUTO: 88.3 FL (ref 82.6–102.9)
NRBC AUTOMATED: 0 PER 100 WBC
PDW BLD-RTO: 15 % (ref 11.8–14.4)
PLATELET # BLD: 306 K/UL (ref 138–453)
PMV BLD AUTO: 9.3 FL (ref 8.1–13.5)
POTASSIUM SERPL-SCNC: 3.9 MMOL/L (ref 3.7–5.3)
RBC # BLD: 4.97 M/UL (ref 3.95–5.11)
SODIUM BLD-SCNC: 140 MMOL/L (ref 135–144)
WBC # BLD: 4.8 K/UL (ref 3.5–11.3)

## 2022-04-29 PROCEDURE — 86901 BLOOD TYPING SEROLOGIC RH(D): CPT

## 2022-04-29 PROCEDURE — 86850 RBC ANTIBODY SCREEN: CPT

## 2022-04-29 PROCEDURE — 80048 BASIC METABOLIC PNL TOTAL CA: CPT

## 2022-04-29 PROCEDURE — 85027 COMPLETE CBC AUTOMATED: CPT

## 2022-04-29 PROCEDURE — 93005 ELECTROCARDIOGRAM TRACING: CPT | Performed by: ANESTHESIOLOGY

## 2022-04-29 PROCEDURE — 86900 BLOOD TYPING SEROLOGIC ABO: CPT

## 2022-04-29 PROCEDURE — 36415 COLL VENOUS BLD VENIPUNCTURE: CPT

## 2022-04-29 PROCEDURE — 83036 HEMOGLOBIN GLYCOSYLATED A1C: CPT

## 2022-04-29 RX ORDER — CLINDAMYCIN PHOSPHATE 900 MG/50ML
900 INJECTION INTRAVENOUS ONCE
Status: CANCELLED | OUTPATIENT
Start: 2022-05-12

## 2022-04-29 RX ORDER — MULTIVIT-MIN/IRON/FOLIC ACID/K 18-600-40
1 CAPSULE ORAL DAILY
COMMUNITY

## 2022-04-29 NOTE — H&P
History and Physical Service   [place] [address]    HISTORY AND PHYSICAL EXAMINATION            Date of Evaluation: 4/29/2022  Patient name:  Liz Abbott  MRN:   [id number]  YOB: 1955  PCP:    Parris Moore MD    History Obtained From:     Patient, medical records    History of Present Illness: This is Liz Abbott a 73 y.o. female who presents for a pre-admission testing appointment for an upcoming [address] [place] [address] by Denzil Mcburney, MD scheduled on 5/12/2022 at 0730 due to [facility]. Patient has a history of left lower quadrant diverticulitis with abscess which was initially treated with IV Zosyn and Ertapenem in 2021. Patient underwent resection of sigmoid colon with colostomy on 12/9/2022 for diverticulitis with abscess. Patient had follow up with Dr. Jose Gomez on 2/9/2022 who recommended colonoscopy and then potential colostomy reversal. Patient underwent colonoscopy in April 2022 at the [facility]. Patient presents today for pre-admission testing for surgery. She denies colostomy dysfunction, constipation, bloody stool. Functional Capacity per pt:  1) Pt is not able to walk 2 city blocks on level ground without SOB. \"Probably a little bit but not too bad. \" Patient states she has bilateral knee pain. 2) Pt is able to climb 2 flights of stairs without SOB. 3) Pt is able to walk up a hill for 1-2 city blocks without SOB.     Past Medical History:     Past Medical History:   Diagnosis Date    Arthritis     knee, back    Asthma     Diabetes mellitus ([address]     Diverticulitis     GERD (gastroesophageal reflux disease)     Hyperlipidemia         Past Surgical History:     Past Surgical History:   Procedure Laterality Date    COLONOSCOPY      COLOSTOMY  12/09/2021    By Dr. Medina Sic      umbilical    HERNIA REPAIR  07/19/2016    incisional with mesh, robotic converted to open    SIGMOID COLECTOMY N/A 12/09/2021    BOWEL RESECTION SIGMOID performed by Pio Mack MD at [facility]          Medications Prior to Admission:     Prior to Admission medications    Medication Sig Start Date End Date Taking? Authorizing Provider   Cholecalciferol (VITAMIN D) 50 MCG (2000 UT) CAPS capsule Take 1 capsule by mouth daily   Yes Historical Provider, MD   BIOTIN PO Take 1 tablet by mouth daily   Yes Historical Provider, MD   docusate sodium (COLACE) 100 MG capsule Take 1 capsule by mouth 2 times daily as needed for Constipation 12/14/21   Denny Begin, DO   rosuvastatin (CRESTOR) 5 MG tablet Take 5 mg by mouth nightly    Historical Provider, MD   metFORMIN (GLUCOPHAGE-XR) 500 MG extended release tablet Take 750 mg by mouth 2 times daily (with meals)  10/7/21   Historical Provider, MD   omeprazole (PRILOSEC) 40 MG capsule Take 40 mg by mouth daily as needed     Historical Provider, MD   Multiple Vitamins-Minerals (THERAPEUTIC MULTIVITAMIN-MINERALS) tablet Take 1 tablet by mouth daily    Historical Provider, MD   albuterol sulfate  (90 BASE) MCG/ACT inhaler Inhale 2 puffs into the lungs every 6 hours as needed for Wheezing    Historical Provider, MD        Allergies:     Amoxicillin-pot clavulanate and Sulfa antibiotics    Social History:     Tobacco:    reports that she has quit smoking. Her smoking use included cigarettes. She has a 0.75 pack-year smoking history. She has never used smokeless tobacco.  Alcohol:      reports current alcohol use. Drug Use:  reports no history of drug use. Family History:     History reviewed. No pertinent family history. Review of Systems:     Positive and Negative as described in HPI. CONSTITUTIONAL: Negative for fevers, chills, sweats, fatigue, and weight loss. HEENT: Wears glasses. Bilateral hearing aids. Allergies with runny nose. Negative for throat pain. RESPIRATORY: Asthma. Occasional wheezing. Negative for shortness of breath, cough, congestion. CARDIOVASCULAR: HLD.  Negative for chest pain, blood clot, irregular heartbeat, and palpitations. GASTROINTESTINAL: See HPI Occasional GERD. GENITOURINARY: Negative for difficulty of urination, burning with urination, and frequency. INTEGUMENT: Easy bruising. Negative for rash, skin lesions. HEMATOLOGIC/LYMPHATIC: Negative for swelling/edema. ALLERGIC/IMMUNOLOGIC: Negative for urticaria and itching. ENDOCRINE: Diabetes - managed by primary care. Negative for increase in thirst, increase in urination, and heat or cold intolerance. MUSCULOSKELETAL: Bilateral knee pain. Back pain. Negative muscle aches, and swelling of joints. NEUROLOGICAL: Negative for headaches, dizziness, lightheadedness, numbness, and tingling extremities. BEHAVIOR/PSYCH: Negative for depression and anxiety. Physical Exam:   /68   Pulse 70   Temp 97.7 °F (36.5 °C) (Tympanic)   Resp 16   Ht 5' 8\" (1.727 m)   Wt 190 lb (86.2 kg)   SpO2 99%   BMI 28.89 kg/m²   No LMP recorded. Patient is postmenopausal.  No obstetric history on file. No results for input(s): POCGLU in the last 72 hours. General Appearance:  Alert, well appearing, and in no acute distress. Mental status: Oriented to person, place, and time. Head: Normocephalic and atraumatic. Eye: Wearing glasses. No icterus, redness, pupils equal and reactive, extraocular eye movements intact, and conjunctiva clear. Ear: Bilateral hearing aids. Nose:  No drainage noted. Mouth:  Mucous membranes moist.  Neck:  Supple and no carotid bruits noted. Lungs:  Bilateral equal air entry, clear to auscultation, no wheezing, rales or rhonchi, and normal effort. Cardiovascular:  Normal rate, regular rhythm, no murmur, gallop, or rub. Abdomen: Colostomy intact. Soft, nontender, nondistended, and active bowel sounds. Neurologic: Normal speech and cranial nerves II through XII grossly intact. Strength 5/5 bilaterally. Skin: No gross lesions, rashes, bruising, or bleeding on exposed skin area.   Extremities: Posterior tibial pulses 2+ bilaterally. No pedal edema. No calf tenderness with palpation. Psych: Normal affect. Investigations:      Laboratory Testing:  Recent Results (from the past 24 hour(s))   Basic Metabolic Panel    Collection Time: 22  9:21 AM   Result Value Ref Range    Glucose 95 70 - 99 mg/dL    BUN 14 8 - 23 mg/dL    CREATININE 0.70 0.50 - 0.90 mg/dL    Bun/Cre Ratio 20 9 - 20    Calcium 9.3 8.6 - 10.4 mg/dL    Sodium 140 135 - 144 mmol/L    Potassium 3.9 3.7 - 5.3 mmol/L    Chloride 101 98 - 107 mmol/L    CO2 28 20 - 31 mmol/L    Anion Gap 11 9 - 17 mmol/L    GFR Non-African American >60 >60 mL/min    GFR African American >60 >60 mL/min    GFR Comment         CBC    Collection Time: 22  9:21 AM   Result Value Ref Range    WBC 4.8 3.5 - 11.3 k/uL    RBC 4.97 3.95 - 5.11 m/uL    Hemoglobin 14.0 11.9 - 15.1 g/dL    Hematocrit 43.9 36.3 - 47.1 %    MCV 88.3 82.6 - 102.9 fL    MCH 28.2 25.2 - 33.5 pg    MCHC 31.9 28.4 - 34.8 g/dL    RDW 15.0 (H) 11.8 - 14.4 %    Platelets 559 691 - 940 k/uL    MPV 9.3 8.1 - 13.5 fL    NRBC Automated 0.0 0.0 per 100 WBC   EKG 12 Lead    Collection Time: 22  9:45 AM   Result Value Ref Range    Ventricular Rate 68 BPM    Atrial Rate 68 BPM    P-R Interval 148 ms    QRS Duration 82 ms    Q-T Interval 422 ms    QTc Calculation (Bazett) 448 ms    P Axis -11 degrees    R Axis 7 degrees    T Axis 54 degrees       Recent Labs     22  0921   HGB 14.0   HCT 43.9   WBC 4.8   MCV 88.3      K 3.9      CO2 28   BUN 14   CREATININE 0.70   GLUCOSE 95       No results for input(s): COVID19 in the last 720 hours. *Please note that labs listed above are the most recent lab values available in EPIC at the time of the visit and additional labs may have been drawn or resulted since that time. Imaging/Diagnostics:    No results found. EK2022: See Epic. Diagnosis:      1. REVERSAL OF COLOSTOMY    Plans:     1.  COLOSTOMY REVERSAL      Jyoti Kline, APRN - CNP  4/29/2022  9:52 AM

## 2022-04-29 NOTE — PRE-PROCEDURE INSTRUCTIONS
ARRIVE AT [facility] ON Thursday, May 12,2022 at 05:30 AM    Once you enter the hospital lobby, take the elevators to the second floor. Check-In is at the surgery registration desk. Continue to take your home medications as you normally do up to and including the night before surgery with the exception of any blood thinning medications. Please stop any blood thinning medications as directed by your surgeon or prescribing physician. Failure to stop certain medications may interfere with your scheduled surgery. These may include:  Aspirin, Warfarin (Coumadin), Clopidogrel (Plavix), Ibuprofen (Motrin, Advil), Naproxen (Aleve), Meloxicam (Mobic), Celecoxib (Celebrex), Eliquis, Pradaxa, Xarelto, Effient, Fish Oil, Herbal supplements. Stop biotin 7 days before surgery        Please take the following medication(s) the day of surgery with a small sip of water:  none    PREPARING FOR YOUR SURGERY:     Before surgery, you can play an important role in your own health. Because skin is not sterile, we need to be sure that your skin is as free of germs as possible before surgery by carefully washing before surgery. Preparing or prepping skin before surgery can reduce the risk of a surgical site infection.   Do not shave the area of your body where your surgery will be performed unless you received specific permission from your physician. You will need to shower at home the night before surgery and the morning of surgery with a special soap called chlorhexidine gluconate (CHG*). *Not to be used by people allergic to Chlorhexidine Gluconate (CHG). Following these instructions will help you be sure that your skin is clean before surgery. Instructions on cleaning your skin before surgery: The night before your surgery:      You will need to shower with warm water (not hot) and the CHG soap.  Use a clean wash cloth and a clean towel.   Have clean clothes available to put on after the shower.   First wash your hair with regular shampoo. Rinse your hair and body thoroughly to remove the shampoo.  Wash your face and genital area (private parts) with your regular soap or water only. Thoroughly rinse your body with warm water from the neck down.  Turn water off to prevent rinsing the soap off too soon.  With a clean wet washcloth and half of the CHG soap in the bottle, lather your entire body from the neck down. Do not use CHG soap near your eyes or ears to avoid injury to those areas.  Wash thoroughly, paying special attention to the area where your surgery will be performed.  Wash your body gently for five (5) minutes. Avoid scrubbing your skin too hard.  Turn the water back on and rinse your body thoroughly.  Pat yourself dry with a clean, soft towel. Do not apply lotion, cream or powder.  Dress with clean freshly washed clothes. The morning of surgery:     Repeat shower following steps above - using remaining half of CHG soap in bottle. Patient Instructions:    Floydene Ada If you are having any type of anesthesia you are to have nothing to eat or drink after midnight the night before your surgery. This includes gum, hard candy, mints, water or smoking or chewing tobacco.  The only exception to this is a small sip of water to take with any morning dose of heart, blood pressure, or seizure medications. No alcoholic beverages for 24 hours prior to surgery.  Brush your teeth but do not swallow water.  Bring your eyeglasses and case with you. No contacts are to be worn the day of surgery. You also may bring your hearing aids. Most surgical procedures involving anesthesia will require that you remove your dentures prior to surgery. · Do not wear any jewelry or body piercings day of surgery. Also, NO lotion, perfume or deodorant to be used the day of surgery.   No nail polish on the operative extremity (arm/leg surgeries)    · If you are staying overnight with us, please bring a small bag of necessary personal items.  Please wear loose, comfortable clothing. If you are potentially going to have a cast or brace bring clothing that will fit over them.  In case of illness - If you have cold or flu like symptoms (high fever, runny nose, sore throat, cough, etc.) rash, nausea, vomiting, loose stools, and/or recent contact with someone who has a contagious disease (chicken pox, measles, etc.) Please call your doctor before coming to the hospital.         Day of Surgery/Procedure:    As a patient at [facility] you can expect quality medical and nursing care that is centered on your individual needs. Our goal is to make your surgical experience as comfortable as possible    . Transportation After Your Surgery/Procedure: You will need a friend or family member to drive you home after your procedure. Your  must be 25years of age or older and able to sign off on your discharge instructions. A taxi cab or any other form of public transportation is not acceptable. Your friend or family member must stay at the hospital throughout your procedure. Someone must remain with you for the first 24 hours after your surgery if you receive anesthesia or medication. If you do not have someone to stay with you, your procedure may be cancelled.       If you have any other questions regarding your procedure or the day of surgery, please call [phone]      _________________________  ____________________________  Signature (Patient)              Signature (Provider) & date

## 2022-04-29 NOTE — H&P (VIEW-ONLY)
History and Physical Service   [facility] - INPATIENT    HISTORY AND PHYSICAL EXAMINATION            Date of Evaluation: 4/29/2022  Patient name:  Feli Barahona  MRN:   [id number]  YOB: 1955  PCP:    Júnior Meeks MD    History Obtained From:     Patient, medical records    History of Present Illness: This is Feli Barahona a 73 y.o. female who presents for a pre-admission testing appointment for an upcoming [address] [place] [address] by Mohini Ferro MD scheduled on 5/12/2022 at 0730 due to [address]. Patient has a history of left lower quadrant diverticulitis with abscess which was initially treated with IV Zosyn and Ertapenem in 2021. Patient underwent resection of sigmoid colon with colostomy on 12/9/2022 for diverticulitis with abscess. Patient had follow up with Dr. Zoe Garcia on 2/9/2022 who recommended colonoscopy and then potential colostomy reversal. Patient underwent colonoscopy in April 2022 at the [facility]. Patient presents today for pre-admission testing for surgery. She denies colostomy dysfunction, constipation, bloody stool. Functional Capacity per pt:  1) Pt is not able to walk 2 city blocks on level ground without SOB. \"Probably a little bit but not too bad. \" Patient states she has bilateral knee pain. 2) Pt is able to climb 2 flights of stairs without SOB. 3) Pt is able to walk up a hill for 1-2 city blocks without SOB.     Past Medical History:     Past Medical History:   Diagnosis Date    Arthritis     knee, back    Asthma     Diabetes mellitus ([address]     Diverticulitis     GERD (gastroesophageal reflux disease)     Hyperlipidemia         Past Surgical History:     Past Surgical History:   Procedure Laterality Date    COLONOSCOPY      COLOSTOMY  12/09/2021    By Dr. Candy Rendon      umbilical    HERNIA REPAIR  07/19/2016    incisional with mesh, robotic converted to open    SIGMOID COLECTOMY N/A 12/09/2021    BOWEL RESECTION SIGMOID performed by Sathish Reilly MD at [address]           Medications Prior to Admission:     Prior to Admission medications    Medication Sig Start Date End Date Taking? Authorizing Provider   Cholecalciferol (VITAMIN D) 50 MCG (2000 UT) CAPS capsule Take 1 capsule by mouth daily   Yes Historical Provider, MD   BIOTIN PO Take 1 tablet by mouth daily   Yes Historical Provider, MD   docusate sodium (COLACE) 100 MG capsule Take 1 capsule by mouth 2 times daily as needed for Constipation 12/14/21   Nayana Mom, DO   rosuvastatin (CRESTOR) 5 MG tablet Take 5 mg by mouth nightly    Historical Provider, MD   metFORMIN (GLUCOPHAGE-XR) 500 MG extended release tablet Take 750 mg by mouth 2 times daily (with meals)  10/7/21   Historical Provider, MD   omeprazole (PRILOSEC) 40 MG capsule Take 40 mg by mouth daily as needed     Historical Provider, MD   Multiple Vitamins-Minerals (THERAPEUTIC MULTIVITAMIN-MINERALS) tablet Take 1 tablet by mouth daily    Historical Provider, MD   albuterol sulfate  (90 BASE) MCG/ACT inhaler Inhale 2 puffs into the lungs every 6 hours as needed for Wheezing    Historical Provider, MD        Allergies:     Amoxicillin-pot clavulanate and Sulfa antibiotics    Social History:     Tobacco:    reports that she has quit smoking. Her smoking use included cigarettes. She has a 0.75 pack-year smoking history. She has never used smokeless tobacco.  Alcohol:      reports current alcohol use. Drug Use:  reports no history of drug use. Family History:     History reviewed. No pertinent family history. Review of Systems:     Positive and Negative as described in HPI. CONSTITUTIONAL: Negative for fevers, chills, sweats, fatigue, and weight loss. HEENT: Wears glasses. Bilateral hearing aids. Allergies with runny nose. Negative for throat pain. RESPIRATORY: Asthma. Occasional wheezing. Negative for shortness of breath, cough, congestion. CARDIOVASCULAR: HLD.  Negative for chest pain, blood clot, irregular heartbeat, and palpitations. GASTROINTESTINAL: See HPI Occasional GERD. GENITOURINARY: Negative for difficulty of urination, burning with urination, and frequency. INTEGUMENT: Easy bruising. Negative for rash, skin lesions. HEMATOLOGIC/LYMPHATIC: Negative for swelling/edema. ALLERGIC/IMMUNOLOGIC: Negative for urticaria and itching. ENDOCRINE: Diabetes - managed by primary care. Negative for increase in thirst, increase in urination, and heat or cold intolerance. MUSCULOSKELETAL: Bilateral knee pain. Back pain. Negative muscle aches, and swelling of joints. NEUROLOGICAL: Negative for headaches, dizziness, lightheadedness, numbness, and tingling extremities. BEHAVIOR/PSYCH: Negative for depression and anxiety. Physical Exam:   /68   Pulse 70   Temp 97.7 °F (36.5 °C) (Tympanic)   Resp 16   Ht 5' 8\" (1.727 m)   Wt 190 lb (86.2 kg)   SpO2 99%   BMI 28.89 kg/m²   No LMP recorded. Patient is postmenopausal.  No obstetric history on file. No results for input(s): POCGLU in the last 72 hours. General Appearance:  Alert, well appearing, and in no acute distress. Mental status: Oriented to person, place, and time. Head: Normocephalic and atraumatic. Eye: Wearing glasses. No icterus, redness, pupils equal and reactive, extraocular eye movements intact, and conjunctiva clear. Ear: Bilateral hearing aids. Nose:  No drainage noted. Mouth:  Mucous membranes moist.  Neck:  Supple and no carotid bruits noted. Lungs:  Bilateral equal air entry, clear to auscultation, no wheezing, rales or rhonchi, and normal effort. Cardiovascular:  Normal rate, regular rhythm, no murmur, gallop, or rub. Abdomen: Colostomy intact. Soft, nontender, nondistended, and active bowel sounds. Neurologic: Normal speech and cranial nerves II through XII grossly intact. Strength 5/5 bilaterally. Skin: No gross lesions, rashes, bruising, or bleeding on exposed skin area.   Extremities: Posterior tibial pulses 2+ bilaterally. No pedal edema. No calf tenderness with palpation. Psych: Normal affect. Investigations:      Laboratory Testing:  Recent Results (from the past 24 hour(s))   Basic Metabolic Panel    Collection Time: 22  9:21 AM   Result Value Ref Range    Glucose 95 70 - 99 mg/dL    BUN 14 8 - 23 mg/dL    CREATININE 0.70 0.50 - 0.90 mg/dL    Bun/Cre Ratio 20 9 - 20    Calcium 9.3 8.6 - 10.4 mg/dL    Sodium 140 135 - 144 mmol/L    Potassium 3.9 3.7 - 5.3 mmol/L    Chloride 101 98 - 107 mmol/L    CO2 28 20 - 31 mmol/L    Anion Gap 11 9 - 17 mmol/L    GFR Non-African American >60 >60 mL/min    GFR African American >60 >60 mL/min    GFR Comment         CBC    Collection Time: 22  9:21 AM   Result Value Ref Range    WBC 4.8 3.5 - 11.3 k/uL    RBC 4.97 3.95 - 5.11 m/uL    Hemoglobin 14.0 11.9 - 15.1 g/dL    Hematocrit 43.9 36.3 - 47.1 %    MCV 88.3 82.6 - 102.9 fL    MCH 28.2 25.2 - 33.5 pg    MCHC 31.9 28.4 - 34.8 g/dL    RDW 15.0 (H) 11.8 - 14.4 %    Platelets 448 616 - 705 k/uL    MPV 9.3 8.1 - 13.5 fL    NRBC Automated 0.0 0.0 per 100 WBC   EKG 12 Lead    Collection Time: 22  9:45 AM   Result Value Ref Range    Ventricular Rate 68 BPM    Atrial Rate 68 BPM    P-R Interval 148 ms    QRS Duration 82 ms    Q-T Interval 422 ms    QTc Calculation (Bazett) 448 ms    P Axis -11 degrees    R Axis 7 degrees    T Axis 54 degrees       Recent Labs     22  0921   HGB 14.0   HCT 43.9   WBC 4.8   MCV 88.3      K 3.9      CO2 28   BUN 14   CREATININE 0.70   GLUCOSE 95       No results for input(s): COVID19 in the last 720 hours. *Please note that labs listed above are the most recent lab values available in EPIC at the time of the visit and additional labs may have been drawn or resulted since that time. Imaging/Diagnostics:    No results found. EK2022: See Epic. Diagnosis:      1. REVERSAL OF COLOSTOMY    Plans:     1.  COLOSTOMY REVERSAL      Jyoti Kline, APRN - CNP  4/29/2022  9:52 AM

## 2022-05-11 ENCOUNTER — ANESTHESIA EVENT (OUTPATIENT)
Dept: OPERATING ROOM | Age: 67
DRG: 329 | End: 2022-05-11
Payer: COMMERCIAL

## 2022-05-12 ENCOUNTER — ANESTHESIA (OUTPATIENT)
Dept: OPERATING ROOM | Age: 67
DRG: 329 | End: 2022-05-12
Payer: COMMERCIAL

## 2022-05-12 ENCOUNTER — HOSPITAL ENCOUNTER (INPATIENT)
Age: 67
LOS: 6 days | Discharge: HOME | DRG: 329 | End: 2022-05-18
Attending: SURGERY | Admitting: SURGERY
Payer: COMMERCIAL

## 2022-05-12 VITALS — TEMPERATURE: 92.3 F | SYSTOLIC BLOOD PRESSURE: 115 MMHG | OXYGEN SATURATION: 92 % | DIASTOLIC BLOOD PRESSURE: 74 MMHG

## 2022-05-12 DIAGNOSIS — G89.18 POST-OP PAIN: Primary | ICD-10-CM

## 2022-05-12 PROBLEM — Z98.890 HISTORY OF COLOSTOMY REVERSAL: Status: ACTIVE | Noted: 2022-05-12

## 2022-05-12 LAB
GLUCOSE BLD-MCNC: 114 MG/DL (ref 65–105)
GLUCOSE BLD-MCNC: 119 MG/DL (ref 65–105)
GLUCOSE BLD-MCNC: 138 MG/DL (ref 65–105)
GLUCOSE BLD-MCNC: 151 MG/DL (ref 65–105)
GLUCOSE BLD-MCNC: 192 MG/DL (ref 65–105)
GLUCOSE BLD-MCNC: 230 MG/DL (ref 65–105)

## 2022-05-12 PROCEDURE — 0DBN0ZZ EXCISION OF SIGMOID COLON, OPEN APPROACH: ICD-10-PCS | Performed by: SURGERY

## 2022-05-12 PROCEDURE — 3700000001 HC ADD 15 MINUTES (ANESTHESIA): Performed by: SURGERY

## 2022-05-12 PROCEDURE — 1200000000 HC SEMI PRIVATE

## 2022-05-12 PROCEDURE — 2709999900 HC NON-CHARGEABLE SUPPLY: Performed by: SURGERY

## 2022-05-12 PROCEDURE — 2580000003 HC RX 258: Performed by: ANESTHESIOLOGY

## 2022-05-12 PROCEDURE — 6360000002 HC RX W HCPCS: Performed by: STUDENT IN AN ORGANIZED HEALTH CARE EDUCATION/TRAINING PROGRAM

## 2022-05-12 PROCEDURE — 7100000000 HC PACU RECOVERY - FIRST 15 MIN: Performed by: SURGERY

## 2022-05-12 PROCEDURE — 0WQF0ZZ REPAIR ABDOMINAL WALL, OPEN APPROACH: ICD-10-PCS | Performed by: SURGERY

## 2022-05-12 PROCEDURE — 3700000000 HC ANESTHESIA ATTENDED CARE: Performed by: SURGERY

## 2022-05-12 PROCEDURE — 0DNL0ZZ RELEASE TRANSVERSE COLON, OPEN APPROACH: ICD-10-PCS | Performed by: SURGERY

## 2022-05-12 PROCEDURE — 88300 SURGICAL PATH GROSS: CPT

## 2022-05-12 PROCEDURE — 2500000003 HC RX 250 WO HCPCS: Performed by: STUDENT IN AN ORGANIZED HEALTH CARE EDUCATION/TRAINING PROGRAM

## 2022-05-12 PROCEDURE — 6360000002 HC RX W HCPCS

## 2022-05-12 PROCEDURE — 7100000001 HC PACU RECOVERY - ADDTL 15 MIN: Performed by: SURGERY

## 2022-05-12 PROCEDURE — 6360000002 HC RX W HCPCS: Performed by: NURSE ANESTHETIST, CERTIFIED REGISTERED

## 2022-05-12 PROCEDURE — 2720000010 HC SURG SUPPLY STERILE: Performed by: SURGERY

## 2022-05-12 PROCEDURE — 2580000003 HC RX 258: Performed by: STUDENT IN AN ORGANIZED HEALTH CARE EDUCATION/TRAINING PROGRAM

## 2022-05-12 PROCEDURE — 6360000002 HC RX W HCPCS: Performed by: ANESTHESIOLOGY

## 2022-05-12 PROCEDURE — 82947 ASSAY GLUCOSE BLOOD QUANT: CPT

## 2022-05-12 PROCEDURE — 0WPF0JZ REMOVAL OF SYNTHETIC SUBSTITUTE FROM ABDOMINAL WALL, OPEN APPROACH: ICD-10-PCS | Performed by: SURGERY

## 2022-05-12 PROCEDURE — 0DN80ZZ RELEASE SMALL INTESTINE, OPEN APPROACH: ICD-10-PCS | Performed by: SURGERY

## 2022-05-12 PROCEDURE — 2500000003 HC RX 250 WO HCPCS: Performed by: NURSE ANESTHETIST, CERTIFIED REGISTERED

## 2022-05-12 PROCEDURE — 3600000002 HC SURGERY LEVEL 2 BASE: Performed by: SURGERY

## 2022-05-12 PROCEDURE — A4216 STERILE WATER/SALINE, 10 ML: HCPCS | Performed by: STUDENT IN AN ORGANIZED HEALTH CARE EDUCATION/TRAINING PROGRAM

## 2022-05-12 PROCEDURE — 0DBP0ZZ EXCISION OF RECTUM, OPEN APPROACH: ICD-10-PCS | Performed by: SURGERY

## 2022-05-12 PROCEDURE — 0DBM0ZZ EXCISION OF DESCENDING COLON, OPEN APPROACH: ICD-10-PCS | Performed by: SURGERY

## 2022-05-12 PROCEDURE — 3600000012 HC SURGERY LEVEL 2 ADDTL 15MIN: Performed by: SURGERY

## 2022-05-12 PROCEDURE — 2500000003 HC RX 250 WO HCPCS: Performed by: ANESTHESIOLOGY

## 2022-05-12 PROCEDURE — 88304 TISSUE EXAM BY PATHOLOGIST: CPT

## 2022-05-12 PROCEDURE — 6370000000 HC RX 637 (ALT 250 FOR IP): Performed by: STUDENT IN AN ORGANIZED HEALTH CARE EDUCATION/TRAINING PROGRAM

## 2022-05-12 RX ORDER — SODIUM CHLORIDE, SODIUM LACTATE, POTASSIUM CHLORIDE, AND CALCIUM CHLORIDE .6; .31; .03; .02 G/100ML; G/100ML; G/100ML; G/100ML
1000 INJECTION, SOLUTION INTRAVENOUS ONCE
Status: COMPLETED | OUTPATIENT
Start: 2022-05-12 | End: 2022-05-12

## 2022-05-12 RX ORDER — FENTANYL CITRATE 50 UG/ML
50 INJECTION, SOLUTION INTRAMUSCULAR; INTRAVENOUS EVERY 5 MIN PRN
Status: COMPLETED | OUTPATIENT
Start: 2022-05-12 | End: 2022-05-12

## 2022-05-12 RX ORDER — ONDANSETRON 2 MG/ML
4 INJECTION INTRAMUSCULAR; INTRAVENOUS
Status: DISCONTINUED | OUTPATIENT
Start: 2022-05-12 | End: 2022-05-12 | Stop reason: HOSPADM

## 2022-05-12 RX ORDER — SODIUM CHLORIDE 0.9 % (FLUSH) 0.9 %
5-40 SYRINGE (ML) INJECTION EVERY 12 HOURS SCHEDULED
Status: DISCONTINUED | OUTPATIENT
Start: 2022-05-12 | End: 2022-05-18 | Stop reason: HOSPADM

## 2022-05-12 RX ORDER — PHENYLEPHRINE HCL IN 0.9% NACL 1 MG/10 ML
SYRINGE (ML) INTRAVENOUS PRN
Status: DISCONTINUED | OUTPATIENT
Start: 2022-05-12 | End: 2022-05-12 | Stop reason: SDUPTHER

## 2022-05-12 RX ORDER — OXYCODONE HYDROCHLORIDE 5 MG/1
5 TABLET ORAL
Status: DISCONTINUED | OUTPATIENT
Start: 2022-05-12 | End: 2022-05-12 | Stop reason: HOSPADM

## 2022-05-12 RX ORDER — MORPHINE SULFATE 4 MG/ML
4 INJECTION, SOLUTION INTRAMUSCULAR; INTRAVENOUS
Status: DISCONTINUED | OUTPATIENT
Start: 2022-05-12 | End: 2022-05-15

## 2022-05-12 RX ORDER — OXYCODONE HYDROCHLORIDE 5 MG/1
5 TABLET ORAL EVERY 6 HOURS PRN
Status: DISCONTINUED | OUTPATIENT
Start: 2022-05-12 | End: 2022-05-15

## 2022-05-12 RX ORDER — SODIUM CHLORIDE 9 MG/ML
INJECTION, SOLUTION INTRAVENOUS PRN
Status: DISCONTINUED | OUTPATIENT
Start: 2022-05-12 | End: 2022-05-12 | Stop reason: HOSPADM

## 2022-05-12 RX ORDER — SODIUM CHLORIDE, SODIUM LACTATE, POTASSIUM CHLORIDE, CALCIUM CHLORIDE 600; 310; 30; 20 MG/100ML; MG/100ML; MG/100ML; MG/100ML
INJECTION, SOLUTION INTRAVENOUS CONTINUOUS
Status: DISCONTINUED | OUTPATIENT
Start: 2022-05-12 | End: 2022-05-12 | Stop reason: HOSPADM

## 2022-05-12 RX ORDER — SODIUM CHLORIDE 0.9 % (FLUSH) 0.9 %
5-40 SYRINGE (ML) INJECTION PRN
Status: DISCONTINUED | OUTPATIENT
Start: 2022-05-12 | End: 2022-05-12 | Stop reason: HOSPADM

## 2022-05-12 RX ORDER — ALBUTEROL SULFATE 90 UG/1
2 AEROSOL, METERED RESPIRATORY (INHALATION) EVERY 6 HOURS PRN
Status: DISCONTINUED | OUTPATIENT
Start: 2022-05-12 | End: 2022-05-18 | Stop reason: HOSPADM

## 2022-05-12 RX ORDER — LIDOCAINE HYDROCHLORIDE 10 MG/ML
1 INJECTION, SOLUTION EPIDURAL; INFILTRATION; INTRACAUDAL; PERINEURAL
Status: DISCONTINUED | OUTPATIENT
Start: 2022-05-12 | End: 2022-05-12 | Stop reason: HOSPADM

## 2022-05-12 RX ORDER — CLINDAMYCIN PHOSPHATE 900 MG/50ML
900 INJECTION INTRAVENOUS ONCE
Status: COMPLETED | OUTPATIENT
Start: 2022-05-12 | End: 2022-05-12

## 2022-05-12 RX ORDER — ACETAMINOPHEN 500 MG
1000 TABLET ORAL EVERY 8 HOURS SCHEDULED
Status: DISCONTINUED | OUTPATIENT
Start: 2022-05-12 | End: 2022-05-15

## 2022-05-12 RX ORDER — FENTANYL CITRATE 50 UG/ML
25 INJECTION, SOLUTION INTRAMUSCULAR; INTRAVENOUS EVERY 5 MIN PRN
Status: DISCONTINUED | OUTPATIENT
Start: 2022-05-12 | End: 2022-05-12 | Stop reason: HOSPADM

## 2022-05-12 RX ORDER — EPHEDRINE SULFATE/0.9% NACL/PF 50 MG/5 ML
SYRINGE (ML) INTRAVENOUS PRN
Status: DISCONTINUED | OUTPATIENT
Start: 2022-05-12 | End: 2022-05-12 | Stop reason: SDUPTHER

## 2022-05-12 RX ORDER — ONDANSETRON 2 MG/ML
4 INJECTION INTRAMUSCULAR; INTRAVENOUS EVERY 6 HOURS PRN
Status: DISCONTINUED | OUTPATIENT
Start: 2022-05-12 | End: 2022-05-18 | Stop reason: HOSPADM

## 2022-05-12 RX ORDER — INSULIN LISPRO 100 [IU]/ML
0-18 INJECTION, SOLUTION INTRAVENOUS; SUBCUTANEOUS EVERY 4 HOURS
Status: DISCONTINUED | OUTPATIENT
Start: 2022-05-12 | End: 2022-05-17

## 2022-05-12 RX ORDER — MORPHINE SULFATE 2 MG/ML
2 INJECTION, SOLUTION INTRAMUSCULAR; INTRAVENOUS
Status: DISCONTINUED | OUTPATIENT
Start: 2022-05-12 | End: 2022-05-15

## 2022-05-12 RX ORDER — FENTANYL CITRATE 50 UG/ML
INJECTION, SOLUTION INTRAMUSCULAR; INTRAVENOUS PRN
Status: DISCONTINUED | OUTPATIENT
Start: 2022-05-12 | End: 2022-05-12 | Stop reason: SDUPTHER

## 2022-05-12 RX ORDER — ROSUVASTATIN CALCIUM 10 MG/1
5 TABLET, COATED ORAL NIGHTLY
Status: DISCONTINUED | OUTPATIENT
Start: 2022-05-13 | End: 2022-05-18 | Stop reason: HOSPADM

## 2022-05-12 RX ORDER — LIDOCAINE HYDROCHLORIDE 20 MG/ML
INJECTION, SOLUTION EPIDURAL; INFILTRATION; INTRACAUDAL; PERINEURAL PRN
Status: DISCONTINUED | OUTPATIENT
Start: 2022-05-12 | End: 2022-05-12 | Stop reason: SDUPTHER

## 2022-05-12 RX ORDER — ROCURONIUM BROMIDE 10 MG/ML
INJECTION, SOLUTION INTRAVENOUS PRN
Status: DISCONTINUED | OUTPATIENT
Start: 2022-05-12 | End: 2022-05-12 | Stop reason: SDUPTHER

## 2022-05-12 RX ORDER — SODIUM CHLORIDE, SODIUM LACTATE, POTASSIUM CHLORIDE, CALCIUM CHLORIDE 600; 310; 30; 20 MG/100ML; MG/100ML; MG/100ML; MG/100ML
INJECTION, SOLUTION INTRAVENOUS CONTINUOUS
Status: DISCONTINUED | OUTPATIENT
Start: 2022-05-12 | End: 2022-05-14

## 2022-05-12 RX ORDER — MIDAZOLAM HYDROCHLORIDE 1 MG/ML
INJECTION INTRAMUSCULAR; INTRAVENOUS PRN
Status: DISCONTINUED | OUTPATIENT
Start: 2022-05-12 | End: 2022-05-12 | Stop reason: SDUPTHER

## 2022-05-12 RX ORDER — SODIUM CHLORIDE 9 MG/ML
INJECTION, SOLUTION INTRAVENOUS CONTINUOUS
Status: DISCONTINUED | OUTPATIENT
Start: 2022-05-12 | End: 2022-05-12 | Stop reason: HOSPADM

## 2022-05-12 RX ORDER — SODIUM CHLORIDE 9 MG/ML
INJECTION, SOLUTION INTRAVENOUS PRN
Status: DISCONTINUED | OUTPATIENT
Start: 2022-05-12 | End: 2022-05-18 | Stop reason: HOSPADM

## 2022-05-12 RX ORDER — PROPOFOL 10 MG/ML
INJECTION, EMULSION INTRAVENOUS PRN
Status: DISCONTINUED | OUTPATIENT
Start: 2022-05-12 | End: 2022-05-12 | Stop reason: SDUPTHER

## 2022-05-12 RX ORDER — HYDROMORPHONE HCL 110MG/55ML
PATIENT CONTROLLED ANALGESIA SYRINGE INTRAVENOUS PRN
Status: DISCONTINUED | OUTPATIENT
Start: 2022-05-12 | End: 2022-05-12 | Stop reason: SDUPTHER

## 2022-05-12 RX ORDER — ONDANSETRON 2 MG/ML
INJECTION INTRAMUSCULAR; INTRAVENOUS PRN
Status: DISCONTINUED | OUTPATIENT
Start: 2022-05-12 | End: 2022-05-12 | Stop reason: SDUPTHER

## 2022-05-12 RX ORDER — FENTANYL CITRATE 50 UG/ML
INJECTION, SOLUTION INTRAMUSCULAR; INTRAVENOUS
Status: COMPLETED
Start: 2022-05-12 | End: 2022-05-12

## 2022-05-12 RX ORDER — KETOROLAC TROMETHAMINE 30 MG/ML
INJECTION, SOLUTION INTRAMUSCULAR; INTRAVENOUS PRN
Status: DISCONTINUED | OUTPATIENT
Start: 2022-05-12 | End: 2022-05-12 | Stop reason: SDUPTHER

## 2022-05-12 RX ORDER — SODIUM CHLORIDE 0.9 % (FLUSH) 0.9 %
5-40 SYRINGE (ML) INJECTION EVERY 12 HOURS SCHEDULED
Status: DISCONTINUED | OUTPATIENT
Start: 2022-05-12 | End: 2022-05-12 | Stop reason: HOSPADM

## 2022-05-12 RX ORDER — SODIUM CHLORIDE 0.9 % (FLUSH) 0.9 %
5-40 SYRINGE (ML) INJECTION PRN
Status: DISCONTINUED | OUTPATIENT
Start: 2022-05-12 | End: 2022-05-18 | Stop reason: HOSPADM

## 2022-05-12 RX ORDER — DEXAMETHASONE SODIUM PHOSPHATE 10 MG/ML
INJECTION, SOLUTION INTRAMUSCULAR; INTRAVENOUS PRN
Status: DISCONTINUED | OUTPATIENT
Start: 2022-05-12 | End: 2022-05-12 | Stop reason: SDUPTHER

## 2022-05-12 RX ORDER — ONDANSETRON 4 MG/1
4 TABLET, ORALLY DISINTEGRATING ORAL EVERY 8 HOURS PRN
Status: DISCONTINUED | OUTPATIENT
Start: 2022-05-12 | End: 2022-05-18 | Stop reason: HOSPADM

## 2022-05-12 RX ADMIN — ONDANSETRON 4 MG: 2 INJECTION INTRAMUSCULAR; INTRAVENOUS at 10:49

## 2022-05-12 RX ADMIN — Medication 10 MG: at 10:54

## 2022-05-12 RX ADMIN — Medication 100 MCG: at 10:31

## 2022-05-12 RX ADMIN — FENTANYL CITRATE 50 MCG: 50 INJECTION, SOLUTION INTRAMUSCULAR; INTRAVENOUS at 11:50

## 2022-05-12 RX ADMIN — SUGAMMADEX 200 MG: 100 INJECTION, SOLUTION INTRAVENOUS at 10:55

## 2022-05-12 RX ADMIN — Medication 100 MCG: at 08:10

## 2022-05-12 RX ADMIN — ROCURONIUM BROMIDE 50 MG: 10 INJECTION, SOLUTION INTRAVENOUS at 07:23

## 2022-05-12 RX ADMIN — FAMOTIDINE 20 MG: 10 INJECTION, SOLUTION INTRAVENOUS at 13:48

## 2022-05-12 RX ADMIN — SODIUM CHLORIDE, POTASSIUM CHLORIDE, SODIUM LACTATE AND CALCIUM CHLORIDE 1000 ML: 600; 310; 30; 20 INJECTION, SOLUTION INTRAVENOUS at 15:07

## 2022-05-12 RX ADMIN — Medication 10 MG: at 09:11

## 2022-05-12 RX ADMIN — Medication 50 MCG: at 09:26

## 2022-05-12 RX ADMIN — MIDAZOLAM 2 MG: 1 INJECTION INTRAMUSCULAR; INTRAVENOUS at 07:15

## 2022-05-12 RX ADMIN — GENTAMICIN SULFATE 360 MG: 40 INJECTION, SOLUTION INTRAMUSCULAR; INTRAVENOUS at 07:39

## 2022-05-12 RX ADMIN — Medication 50 MCG: at 07:21

## 2022-05-12 RX ADMIN — Medication 10 MG: at 08:18

## 2022-05-12 RX ADMIN — ROCURONIUM BROMIDE 10 MG: 10 INJECTION, SOLUTION INTRAVENOUS at 09:19

## 2022-05-12 RX ADMIN — Medication 100 MCG: at 08:12

## 2022-05-12 RX ADMIN — FENTANYL CITRATE 50 MCG: 50 INJECTION, SOLUTION INTRAMUSCULAR; INTRAVENOUS at 12:29

## 2022-05-12 RX ADMIN — FENTANYL CITRATE 50 MCG: 50 INJECTION, SOLUTION INTRAMUSCULAR; INTRAVENOUS at 12:40

## 2022-05-12 RX ADMIN — Medication 50 MCG: at 09:00

## 2022-05-12 RX ADMIN — ACETAMINOPHEN 1000 MG: 500 TABLET ORAL at 21:02

## 2022-05-12 RX ADMIN — FAMOTIDINE 20 MG: 10 INJECTION, SOLUTION INTRAVENOUS at 21:02

## 2022-05-12 RX ADMIN — SODIUM CHLORIDE, POTASSIUM CHLORIDE, SODIUM LACTATE AND CALCIUM CHLORIDE: 600; 310; 30; 20 INJECTION, SOLUTION INTRAVENOUS at 10:26

## 2022-05-12 RX ADMIN — HYDROMORPHONE HYDROCHLORIDE 0.5 MG: 2 INJECTION INTRAMUSCULAR; INTRAVENOUS; SUBCUTANEOUS at 10:50

## 2022-05-12 RX ADMIN — DEXAMETHASONE SODIUM PHOSPHATE 4 MG: 10 INJECTION, SOLUTION INTRAMUSCULAR; INTRAVENOUS at 07:31

## 2022-05-12 RX ADMIN — SODIUM CHLORIDE, POTASSIUM CHLORIDE, SODIUM LACTATE AND CALCIUM CHLORIDE: 600; 310; 30; 20 INJECTION, SOLUTION INTRAVENOUS at 13:41

## 2022-05-12 RX ADMIN — PROPOFOL 140 MG: 10 INJECTION, EMULSION INTRAVENOUS at 07:22

## 2022-05-12 RX ADMIN — SODIUM CHLORIDE, POTASSIUM CHLORIDE, SODIUM LACTATE AND CALCIUM CHLORIDE: 600; 310; 30; 20 INJECTION, SOLUTION INTRAVENOUS at 06:33

## 2022-05-12 RX ADMIN — ROCURONIUM BROMIDE 10 MG: 10 INJECTION, SOLUTION INTRAVENOUS at 09:49

## 2022-05-12 RX ADMIN — ROCURONIUM BROMIDE 10 MG: 10 INJECTION, SOLUTION INTRAVENOUS at 08:15

## 2022-05-12 RX ADMIN — ROCURONIUM BROMIDE 10 MG: 10 INJECTION, SOLUTION INTRAVENOUS at 08:53

## 2022-05-12 RX ADMIN — HYDROMORPHONE HYDROCHLORIDE 0.5 MG: 2 INJECTION INTRAMUSCULAR; INTRAVENOUS; SUBCUTANEOUS at 10:58

## 2022-05-12 RX ADMIN — FENTANYL CITRATE 50 MCG: 50 INJECTION, SOLUTION INTRAMUSCULAR; INTRAVENOUS at 12:09

## 2022-05-12 RX ADMIN — LIDOCAINE HYDROCHLORIDE 80 MG: 20 INJECTION, SOLUTION EPIDURAL; INFILTRATION; INTRACAUDAL; PERINEURAL at 07:21

## 2022-05-12 RX ADMIN — INSULIN LISPRO 6 UNITS: 100 INJECTION, SOLUTION INTRAVENOUS; SUBCUTANEOUS at 13:54

## 2022-05-12 RX ADMIN — KETOROLAC TROMETHAMINE 30 MG: 30 INJECTION, SOLUTION INTRAMUSCULAR at 10:44

## 2022-05-12 RX ADMIN — Medication 50 MCG: at 08:03

## 2022-05-12 RX ADMIN — MORPHINE SULFATE 4 MG: 4 INJECTION, SOLUTION INTRAMUSCULAR; INTRAVENOUS at 13:48

## 2022-05-12 RX ADMIN — CLINDAMYCIN PHOSPHATE 900 MG: 900 INJECTION, SOLUTION INTRAVENOUS at 07:29

## 2022-05-12 RX ADMIN — Medication 100 MCG: at 08:08

## 2022-05-12 RX ADMIN — ACETAMINOPHEN 1000 MG: 500 TABLET ORAL at 15:06

## 2022-05-12 RX ADMIN — Medication 10 MG: at 10:40

## 2022-05-12 ASSESSMENT — PULMONARY FUNCTION TESTS
PIF_VALUE: 22
PIF_VALUE: 18
PIF_VALUE: 21
PIF_VALUE: 15
PIF_VALUE: 18
PIF_VALUE: 15
PIF_VALUE: 2
PIF_VALUE: 25
PIF_VALUE: 21
PIF_VALUE: 17
PIF_VALUE: 24
PIF_VALUE: 21
PIF_VALUE: 22
PIF_VALUE: 15
PIF_VALUE: 20
PIF_VALUE: 18
PIF_VALUE: 22
PIF_VALUE: 2
PIF_VALUE: 3
PIF_VALUE: 22
PIF_VALUE: 20
PIF_VALUE: 21
PIF_VALUE: 15
PIF_VALUE: 20
PIF_VALUE: 2
PIF_VALUE: 21
PIF_VALUE: 22
PIF_VALUE: 20
PIF_VALUE: 24
PIF_VALUE: 21
PIF_VALUE: 23
PIF_VALUE: 21
PIF_VALUE: 16
PIF_VALUE: 20
PIF_VALUE: 22
PIF_VALUE: 17
PIF_VALUE: 21
PIF_VALUE: 21
PIF_VALUE: 19
PIF_VALUE: 22
PIF_VALUE: 15
PIF_VALUE: 2
PIF_VALUE: 23
PIF_VALUE: 22
PIF_VALUE: 22
PIF_VALUE: 19
PIF_VALUE: 19
PIF_VALUE: 20
PIF_VALUE: 15
PIF_VALUE: 15
PIF_VALUE: 22
PIF_VALUE: 22
PIF_VALUE: 21
PIF_VALUE: 21
PIF_VALUE: 18
PIF_VALUE: 19
PIF_VALUE: 22
PIF_VALUE: 19
PIF_VALUE: 3
PIF_VALUE: 21
PIF_VALUE: 21
PIF_VALUE: 22
PIF_VALUE: 15
PIF_VALUE: 20
PIF_VALUE: 18
PIF_VALUE: 15
PIF_VALUE: 22
PIF_VALUE: 15
PIF_VALUE: 22
PIF_VALUE: 22
PIF_VALUE: 18
PIF_VALUE: 19
PIF_VALUE: 16
PIF_VALUE: 2
PIF_VALUE: 22
PIF_VALUE: 22
PIF_VALUE: 17
PIF_VALUE: 15
PIF_VALUE: 21
PIF_VALUE: 24
PIF_VALUE: 20
PIF_VALUE: 21
PIF_VALUE: 20
PIF_VALUE: 2
PIF_VALUE: 2
PIF_VALUE: 16
PIF_VALUE: 5
PIF_VALUE: 15
PIF_VALUE: 22
PIF_VALUE: 2
PIF_VALUE: 21
PIF_VALUE: 22
PIF_VALUE: 2
PIF_VALUE: 2
PIF_VALUE: 17
PIF_VALUE: 2
PIF_VALUE: 21
PIF_VALUE: 2
PIF_VALUE: 21
PIF_VALUE: 2
PIF_VALUE: 15
PIF_VALUE: 22
PIF_VALUE: 21
PIF_VALUE: 22
PIF_VALUE: 21
PIF_VALUE: 19
PIF_VALUE: 22
PIF_VALUE: 23
PIF_VALUE: 18
PIF_VALUE: 24
PIF_VALUE: 15
PIF_VALUE: 20
PIF_VALUE: 19
PIF_VALUE: 19
PIF_VALUE: 23
PIF_VALUE: 15
PIF_VALUE: 17
PIF_VALUE: 20
PIF_VALUE: 21
PIF_VALUE: 22
PIF_VALUE: 15
PIF_VALUE: 22
PIF_VALUE: 15
PIF_VALUE: 23
PIF_VALUE: 20
PIF_VALUE: 19
PIF_VALUE: 22
PIF_VALUE: 19
PIF_VALUE: 15
PIF_VALUE: 15
PIF_VALUE: 3
PIF_VALUE: 21
PIF_VALUE: 6
PIF_VALUE: 19
PIF_VALUE: 1
PIF_VALUE: 21
PIF_VALUE: 14
PIF_VALUE: 15
PIF_VALUE: 22
PIF_VALUE: 23
PIF_VALUE: 15
PIF_VALUE: 21
PIF_VALUE: 20
PIF_VALUE: 18
PIF_VALUE: 15
PIF_VALUE: 21
PIF_VALUE: 22
PIF_VALUE: 15
PIF_VALUE: 20
PIF_VALUE: 15
PIF_VALUE: 15
PIF_VALUE: 2
PIF_VALUE: 21
PIF_VALUE: 20
PIF_VALUE: 19
PIF_VALUE: 19
PIF_VALUE: 22
PIF_VALUE: 23
PIF_VALUE: 20
PIF_VALUE: 18
PIF_VALUE: 2
PIF_VALUE: 23
PIF_VALUE: 20
PIF_VALUE: 21
PIF_VALUE: 21
PIF_VALUE: 18
PIF_VALUE: 17
PIF_VALUE: 20
PIF_VALUE: 18
PIF_VALUE: 20
PIF_VALUE: 22
PIF_VALUE: 17
PIF_VALUE: 20
PIF_VALUE: 0
PIF_VALUE: 23
PIF_VALUE: 21
PIF_VALUE: 17
PIF_VALUE: 18
PIF_VALUE: 22
PIF_VALUE: 23
PIF_VALUE: 18
PIF_VALUE: 19
PIF_VALUE: 22
PIF_VALUE: 18
PIF_VALUE: 2
PIF_VALUE: 0
PIF_VALUE: 22
PIF_VALUE: 25
PIF_VALUE: 20
PIF_VALUE: 17
PIF_VALUE: 23
PIF_VALUE: 23
PIF_VALUE: 18
PIF_VALUE: 17
PIF_VALUE: 22
PIF_VALUE: 23
PIF_VALUE: 25
PIF_VALUE: 15
PIF_VALUE: 15
PIF_VALUE: 21
PIF_VALUE: 17
PIF_VALUE: 21
PIF_VALUE: 22
PIF_VALUE: 1
PIF_VALUE: 22
PIF_VALUE: 21
PIF_VALUE: 15
PIF_VALUE: 18
PIF_VALUE: 21
PIF_VALUE: 22
PIF_VALUE: 21
PIF_VALUE: 23
PIF_VALUE: 2
PIF_VALUE: 22
PIF_VALUE: 19
PIF_VALUE: 22
PIF_VALUE: 17
PIF_VALUE: 21
PIF_VALUE: 18
PIF_VALUE: 21
PIF_VALUE: 15
PIF_VALUE: 6
PIF_VALUE: 2
PIF_VALUE: 23
PIF_VALUE: 20
PIF_VALUE: 17
PIF_VALUE: 18
PIF_VALUE: 21
PIF_VALUE: 22
PIF_VALUE: 15
PIF_VALUE: 21
PIF_VALUE: 15
PIF_VALUE: 15
PIF_VALUE: 23
PIF_VALUE: 2
PIF_VALUE: 0
PIF_VALUE: 22
PIF_VALUE: 20

## 2022-05-12 ASSESSMENT — PAIN - FUNCTIONAL ASSESSMENT
PAIN_FUNCTIONAL_ASSESSMENT: 0-10
PAIN_FUNCTIONAL_ASSESSMENT: PREVENTS OR INTERFERES SOME ACTIVE ACTIVITIES AND ADLS

## 2022-05-12 ASSESSMENT — PAIN DESCRIPTION - PAIN TYPE
TYPE: ACUTE PAIN
TYPE: ACUTE PAIN;SURGICAL PAIN

## 2022-05-12 ASSESSMENT — PAIN SCALES - GENERAL
PAINLEVEL_OUTOF10: 10
PAINLEVEL_OUTOF10: 7
PAINLEVEL_OUTOF10: 4
PAINLEVEL_OUTOF10: 4
PAINLEVEL_OUTOF10: 7
PAINLEVEL_OUTOF10: 9

## 2022-05-12 ASSESSMENT — PAIN DESCRIPTION - DESCRIPTORS
DESCRIPTORS: ACHING
DESCRIPTORS: ACHING;THROBBING
DESCRIPTORS: THROBBING;ACHING
DESCRIPTORS: ACHING;THROBBING
DESCRIPTORS: ACHING;DULL;NAGGING
DESCRIPTORS: ACHING
DESCRIPTORS: ACHING;THROBBING

## 2022-05-12 ASSESSMENT — ENCOUNTER SYMPTOMS: SHORTNESS OF BREATH: 0

## 2022-05-12 ASSESSMENT — PAIN DESCRIPTION - ONSET
ONSET: GRADUAL
ONSET: GRADUAL

## 2022-05-12 ASSESSMENT — PAIN SCALES - WONG BAKER: WONGBAKER_NUMERICALRESPONSE: 0

## 2022-05-12 ASSESSMENT — PAIN DESCRIPTION - ORIENTATION
ORIENTATION: MID

## 2022-05-12 ASSESSMENT — PAIN DESCRIPTION - LOCATION
LOCATION: ABDOMEN
LOCATION: ABDOMEN;RIB CAGE
LOCATION: ABDOMEN

## 2022-05-12 ASSESSMENT — PAIN DESCRIPTION - FREQUENCY
FREQUENCY: CONTINUOUS
FREQUENCY: CONTINUOUS

## 2022-05-12 NOTE — ANESTHESIA POSTPROCEDURE EVALUATION
Department of Anesthesiology  Postprocedure Note    Patient: Chalino Washington  MRN: [id number]  YOB: 1955  Date of evaluation: 5/12/2022  Time:  3:37 PM     Procedure Summary     Date: 05/12/22 Room / Location: [address] [place] [address] / [facility] - INPATIENT    Anesthesia Start: 2041 Anesthesia Stop: 2149    Procedure: COLOSTOMY  REVERSAL PARTIAL COLECTOMY LYSIS OF ADHESIONS REMOVAL OF MESH REPAIR OF PARASTOMAL HERNIA (N/A ) Diagnosis: (DX REVERSAL OF COLOSTOMY (TCC))    Surgeons: Sharifa Johnson MD Responsible Provider: Yair Mukherjee MD    Anesthesia Type: general ASA Status: 2          Anesthesia Type: general    Marshall Phase I: Marshall Score: 8    Marshall Phase II:      Last vitals: Reviewed and per EMR flowsheets.        Anesthesia Post Evaluation    Complications: no

## 2022-05-12 NOTE — OP NOTE
Operative Note      Patient: Cornell Akbar  YOB: 1955  MRN: [id number]    Date of Procedure: 2022    Pre-Op Diagnosis: DX REVERSAL OF COLOSTOMY (TCC)    Post-Op Diagnosis: Same and Intra-abdominal adhesions, parastomal hernia       Procedure(s):  COLOSTOMY  REVERSAL PARTIAL COLECTOMY LYSIS OF ADHESIONS REMOVAL OF MESH REPAIR OF PARASTOMAL HERNIA    Surgeon(s): Pio Mack MD    Assistant:   First Assistant: Joycelyn Mathew  Resident: Tomer Hastings MD    Anesthesia: General    Estimated Blood Loss (mL): less than 760     Complications: None    Specimens:   ID Type Source Tests Collected by Time Destination   A : PORTIONS OF DESCENDING COLON AND RECTOSIGMOID COLON Tissue Colon SURGICAL PATHOLOGY Pio Mack MD 2022 0951    B : [address] Isela Holden MD 2022 1494        Implants:  * No implants in log *      Drains:   Closed/Suction Drain RLQ Bulb (Active)   Site Description Clean, dry & intact 22 1300   Dressing Status Clean, dry & intact 22 1300   Drainage Appearance Bloody 22 1300   Drain Status To bulb suction 22 1300   Output (ml) 60 ml 22 1245       Urinary Catheter Santo (Active)   Catheter Indications Perioperative use for selected surgical procedures 22 1300   Urine Color Yellow 22 1300   Urine Appearance Clear 22 1300   Securement Method Leg strap 22 1300   Catheter Best Practices  Drainage tube clipped to bed;Catheter secured to thigh; Tamper seal intact; Bag below bladder;Bag not on floor; Lack of dependent loop in tubing;Drainage bag less than half full 22 1300   Status Draining 22 1300   Output (mL) 30 mL 22 1245       [REMOVED] Closed/Suction Drain RLQ Bulb 10 Bulgarian (Removed)       [REMOVED] Colostomy LLQ (Removed)       Findings: Intra-abdominal adhesions, small parastomal hernia, previously placed mesh was removed because of contamination    Detailed Description of Procedure:   Patient was brought to the operating room was placed in the supine position. After induction of general endotracheal anesthesia, patient's abdomen was prepped and draped in the usual sterile fashion. A 10/10 drape was used to cover and protect the stoma site. A midline incision was made initially infraumbilically. Incision was taken of the fascia and the fascia was opened for length. Some previously placed Prolene sutures were removed. Minimal adhesions were encountered in the right of the abdomen. Adhesions in the pelvis were noted where small bowel was adhered to each other and to the deep pelvis and these were carefully freed. Examination also revealed the presence of the small parastomal hernia of the left side of the abdomen. Patient was noted to have a previously placed mesh in the umbilical region where loops of small bowel and transverse colon were adhered. We had to extend the incision superiorly for better visualization. Initially, the small bowel was packed in the upper abdomen and a wound protector was placed. After lysing the small bowel adhesion, the rectal stump was identified and appeared to be free from surrounding tissue. We then proceeded with the rest of the lysis of adhesions. Examination revealed the small bowel and transverse colon appeared to be adhered to the previously placed mesh and this was carefully dissected free. We then mobilized the colostomy after scoring the skin with 15 blade. Colon was mobilized. The colon was next brought intra-abdominally and a clamp was placed around the stoma site. As we were dissecting the colon from the mesh, some contamination of the mesh was encountered. In view of the findings we felt that it was most appropriate to remove the mesh to prevent any postoperative mesh related infection. The mesh was therefore sharply removed and was sent to pathology. The colon was freed from surrounding tissue.   The rectal stump was also further cleaned. The site of resection of the rectal stump was identified and a contour stapler was used to transect the bowel at this location. We decided to use size 29 EEA. The proximal bowel was also transected and the anvil of the stapler was inserted proximally and a pursestring suture was placed. At this time the assistant went towards the perineum and we performed a end-to-end anastomosis. Anastomosis was under no tension. Anastomosis was checked by insufflating air and also by insufflating Betadine and no leak was noted. Through a separate stab wound a drain was placed. At this time all instruments were removed andSame, intra-abdominal adhesions, parastomal hernia drapes were placed. Gowns and gloves were changed. Abdominal cavity was next thoroughly irrigated. At this time, after satisfactory irrigation hemostasis, Irrisept was also used to irrigate the abdominal cavity, the stoma parastomal hernia site was closed on the inside by using the #1 Prolene suture. #1 Prolene was next used in running fashion to close the fascia. Fascia was also closed anteriorly at the stoma site with #1 Prolene. After satisfactory irrigation hemostasis, staples were used to close the skin. Iodoform packing was placed. All sponge and instrument counts were reported correct the patient was brought recovery room in stable condition.     Electronically signed by Israel Arcos MD on 5/12/2022 at 1:48 PM

## 2022-05-12 NOTE — RT PROTOCOL NOTE
RT Inhaler-Nebulizer Bronchodilator Protocol Note    There is a bronchodilator order in the chart from a provider indicating to follow the RT Bronchodilator Protocol and there is an Initiate RT Inhaler-Nebulizer Bronchodilator Protocol order as well (see protocol at bottom of note). CXR Findings:  No results found. The findings from the last RT Protocol Assessment were as follows:   History Pulmonary Disease: None or smoker <15 pack years  Respiratory Pattern: Regular pattern and RR 12-20 bpm  Breath Sounds: Clear breath sounds  Cough: Strong, spontaneous, non-productive  Indication for Bronchodilator Therapy: On home bronchodilators  Bronchodilator Assessment Score: 0    Aerosolized bronchodilator medication orders have been revised according to the RT Inhaler-Nebulizer Bronchodilator Protocol below. Respiratory Therapist to perform RT Therapy Protocol Assessment initially then follow the protocol. Repeat RT Therapy Protocol Assessment PRN for score 0-3 or on second treatment, BID, and PRN for scores above 3. No Indications  adjust the frequency to every 6 hours PRN wheezing or bronchospasm, if no treatments needed after 48 hours then discontinue using Per Protocol order mode. If indication present, adjust the RT bronchodilator orders based on the Bronchodilator Assessment Score as indicated below. Use Inhaler orders unless patient has one or more of the following: on home nebulizer, not able to hold breath for 10 seconds, is not alert and oriented, cannot activate and use MDI correctly, or respiratory rate 25 breaths per minute or more, then use the equivalent nebulizer order(s) with same Frequency and PRN reasons based on the score. If a patient is on this medication at home then do not decrease Frequency below that used at home.     0-3  enter or revise RT bronchodilator order(s) to equivalent RT Bronchodilator order with Frequency of every 4 hours PRN for wheezing or increased work of

## 2022-05-12 NOTE — INTERVAL H&P NOTE
Interval H&P Note    Pt Name: Elton Garza  MRN: [id number]  YOB: 1955  Date of evaluation: 5/12/2022      [x] I have reviewed in epic the H&P by EMMA Kline CNP for an Interval History and Physical note. [x] I have examined  Elton Garza  There are no changes to the patient who is scheduled for COLOSTOMY  REVERSAL by Cha Borjas MD for DX REVERSAL OF COLOSTOMY (TCC). The patient followed the bowel prep as directed  She denies new health changes, fever, chills, wheezing, cough, increased SOB, chest pain, open sores or wounds. POC      Vital signs: /75   Pulse 100   Temp 97.3 °F (36.3 °C) (Temporal)   Resp 16   SpO2 96%     Allergies:  Amoxicillin-pot clavulanate and Sulfa antibiotics    Medications:    Prior to Admission medications    Medication Sig Start Date End Date Taking? Authorizing Provider   Cholecalciferol (VITAMIN D) 50 MCG (2000 UT) CAPS capsule Take 1 capsule by mouth daily    Historical Provider, MD   BIOTIN PO Take 1 tablet by mouth daily    Historical Provider, MD   docusate sodium (COLACE) 100 MG capsule Take 1 capsule by mouth 2 times daily as needed for Constipation 12/14/21   Hellen Yu DO   rosuvastatin (CRESTOR) 5 MG tablet Take 5 mg by mouth nightly    Historical Provider, MD   metFORMIN (GLUCOPHAGE-XR) 500 MG extended release tablet Take 750 mg by mouth daily (with breakfast)  10/7/21   Historical Provider, MD   omeprazole (PRILOSEC) 40 MG capsule Take 40 mg by mouth daily as needed     Historical Provider, MD   Multiple Vitamins-Minerals (THERAPEUTIC MULTIVITAMIN-MINERALS) tablet Take 1 tablet by mouth daily    Historical Provider, MD   albuterol sulfate  (90 BASE) MCG/ACT inhaler Inhale 2 puffs into the lungs every 6 hours as needed for Wheezing    Historical Provider, MD         This is a 73 y.o. female who is pleasant, cooperative, alert and oriented x3, in no acute distress.     Heart: Heart sounds are normal.  HR 88 regular rate and rhythm without murmur, gallop or rub. Lungs: Normal respiratory effort with equal expansion, good air exchange, unlabored and clear to auscultation without wheezes or rales bilaterally   Abdomen: colostomy mid lower left quadrant  soft, nontender, nondistended with bowel sounds . Labs:  Recent Labs     04/29/22  0921   HGB 14.0   HCT 43.9   WBC 4.8   MCV 88.3         K 3.9      CO2 28   BUN 14   CREATININE 0.70   GLUCOSE 95       No results for input(s): COVID19 in the last 720 hours.     TAMIKO Oviedo CNP  Electronically signed 5/12/2022 at 6:31 AM

## 2022-05-12 NOTE — PROGRESS NOTES
Patient admitted to room 1001. VS taken, telemetry placed and call light given/within reach. Orders released/reviewed, initial assessment completed and navigator started. See chart for more detail.

## 2022-05-13 LAB
ABSOLUTE EOS #: <0.03 K/UL (ref 0–0.44)
ABSOLUTE IMMATURE GRANULOCYTE: 0.02 K/UL (ref 0–0.3)
ABSOLUTE LYMPH #: 1.45 K/UL (ref 1.1–3.7)
ABSOLUTE MONO #: 0.92 K/UL (ref 0.1–1.2)
ANION GAP SERPL CALCULATED.3IONS-SCNC: 8 MMOL/L (ref 9–17)
BASOPHILS # BLD: 0 % (ref 0–2)
BASOPHILS ABSOLUTE: <0.03 K/UL (ref 0–0.2)
BUN BLDV-MCNC: 13 MG/DL (ref 8–23)
BUN/CREAT BLD: 23 (ref 9–20)
CALCIUM SERPL-MCNC: 8 MG/DL (ref 8.6–10.4)
CHLORIDE BLD-SCNC: 105 MMOL/L (ref 98–107)
CO2: 24 MMOL/L (ref 20–31)
CREAT SERPL-MCNC: 0.56 MG/DL (ref 0.5–0.9)
EOSINOPHILS RELATIVE PERCENT: 0 % (ref 1–4)
GFR AFRICAN AMERICAN: >60 ML/MIN
GFR NON-AFRICAN AMERICAN: >60 ML/MIN
GFR SERPL CREATININE-BSD FRML MDRD: ABNORMAL ML/MIN/{1.73_M2}
GLUCOSE BLD-MCNC: 129 MG/DL (ref 65–105)
GLUCOSE BLD-MCNC: 142 MG/DL (ref 65–105)
GLUCOSE BLD-MCNC: 142 MG/DL (ref 65–105)
GLUCOSE BLD-MCNC: 149 MG/DL (ref 70–99)
GLUCOSE BLD-MCNC: 151 MG/DL (ref 65–105)
GLUCOSE BLD-MCNC: 85 MG/DL (ref 65–105)
GLUCOSE BLD-MCNC: 89 MG/DL (ref 65–105)
HCT VFR BLD CALC: 37.3 % (ref 36.3–47.1)
HEMOGLOBIN: 11.8 G/DL (ref 11.9–15.1)
IMMATURE GRANULOCYTES: 0 %
LYMPHOCYTES # BLD: 13 % (ref 24–43)
MAGNESIUM: 1.8 MG/DL (ref 1.6–2.6)
MCH RBC QN AUTO: 28.4 PG (ref 25.2–33.5)
MCHC RBC AUTO-ENTMCNC: 31.6 G/DL (ref 28.4–34.8)
MCV RBC AUTO: 89.7 FL (ref 82.6–102.9)
MONOCYTES # BLD: 8 % (ref 3–12)
NRBC AUTOMATED: 0 PER 100 WBC
PDW BLD-RTO: 15.5 % (ref 11.8–14.4)
PLATELET # BLD: 257 K/UL (ref 138–453)
PMV BLD AUTO: 9.3 FL (ref 8.1–13.5)
POTASSIUM SERPL-SCNC: 3.8 MMOL/L (ref 3.7–5.3)
RBC # BLD: 4.16 M/UL (ref 3.95–5.11)
RBC # BLD: ABNORMAL 10*6/UL
SEG NEUTROPHILS: 79 % (ref 36–65)
SEGMENTED NEUTROPHILS ABSOLUTE COUNT: 9.07 K/UL (ref 1.5–8.1)
SODIUM BLD-SCNC: 137 MMOL/L (ref 135–144)
WBC # BLD: 11.5 K/UL (ref 3.5–11.3)

## 2022-05-13 PROCEDURE — 83735 ASSAY OF MAGNESIUM: CPT

## 2022-05-13 PROCEDURE — 6360000002 HC RX W HCPCS: Performed by: STUDENT IN AN ORGANIZED HEALTH CARE EDUCATION/TRAINING PROGRAM

## 2022-05-13 PROCEDURE — 82947 ASSAY GLUCOSE BLOOD QUANT: CPT

## 2022-05-13 PROCEDURE — 1200000000 HC SEMI PRIVATE

## 2022-05-13 PROCEDURE — 80048 BASIC METABOLIC PNL TOTAL CA: CPT

## 2022-05-13 PROCEDURE — 36415 COLL VENOUS BLD VENIPUNCTURE: CPT

## 2022-05-13 PROCEDURE — 6370000000 HC RX 637 (ALT 250 FOR IP): Performed by: STUDENT IN AN ORGANIZED HEALTH CARE EDUCATION/TRAINING PROGRAM

## 2022-05-13 PROCEDURE — 85025 COMPLETE CBC W/AUTO DIFF WBC: CPT

## 2022-05-13 PROCEDURE — 2580000003 HC RX 258: Performed by: STUDENT IN AN ORGANIZED HEALTH CARE EDUCATION/TRAINING PROGRAM

## 2022-05-13 PROCEDURE — A4216 STERILE WATER/SALINE, 10 ML: HCPCS | Performed by: STUDENT IN AN ORGANIZED HEALTH CARE EDUCATION/TRAINING PROGRAM

## 2022-05-13 PROCEDURE — 2500000003 HC RX 250 WO HCPCS: Performed by: STUDENT IN AN ORGANIZED HEALTH CARE EDUCATION/TRAINING PROGRAM

## 2022-05-13 RX ORDER — SODIUM CHLORIDE, SODIUM LACTATE, POTASSIUM CHLORIDE, AND CALCIUM CHLORIDE .6; .31; .03; .02 G/100ML; G/100ML; G/100ML; G/100ML
1000 INJECTION, SOLUTION INTRAVENOUS ONCE
Status: COMPLETED | OUTPATIENT
Start: 2022-05-13 | End: 2022-05-13

## 2022-05-13 RX ORDER — FAMOTIDINE 20 MG/1
20 TABLET, FILM COATED ORAL 2 TIMES DAILY
COMMUNITY

## 2022-05-13 RX ORDER — ENOXAPARIN SODIUM 100 MG/ML
40 INJECTION SUBCUTANEOUS DAILY
Status: DISCONTINUED | OUTPATIENT
Start: 2022-05-13 | End: 2022-05-18 | Stop reason: HOSPADM

## 2022-05-13 RX ORDER — DEXTROSE MONOHYDRATE 50 MG/ML
100 INJECTION, SOLUTION INTRAVENOUS PRN
Status: DISCONTINUED | OUTPATIENT
Start: 2022-05-13 | End: 2022-05-18 | Stop reason: HOSPADM

## 2022-05-13 RX ORDER — MAGNESIUM SULFATE IN WATER 40 MG/ML
2000 INJECTION, SOLUTION INTRAVENOUS
Status: COMPLETED | OUTPATIENT
Start: 2022-05-13 | End: 2022-05-13

## 2022-05-13 RX ADMIN — FAMOTIDINE 20 MG: 10 INJECTION, SOLUTION INTRAVENOUS at 21:48

## 2022-05-13 RX ADMIN — MORPHINE SULFATE 2 MG: 2 INJECTION, SOLUTION INTRAMUSCULAR; INTRAVENOUS at 02:41

## 2022-05-13 RX ADMIN — ACETAMINOPHEN 1000 MG: 500 TABLET ORAL at 06:25

## 2022-05-13 RX ADMIN — ONDANSETRON 4 MG: 2 INJECTION INTRAMUSCULAR; INTRAVENOUS at 10:27

## 2022-05-13 RX ADMIN — SODIUM CHLORIDE, POTASSIUM CHLORIDE, SODIUM LACTATE AND CALCIUM CHLORIDE 1000 ML: 600; 310; 30; 20 INJECTION, SOLUTION INTRAVENOUS at 07:16

## 2022-05-13 RX ADMIN — SODIUM CHLORIDE, POTASSIUM CHLORIDE, SODIUM LACTATE AND CALCIUM CHLORIDE: 600; 310; 30; 20 INJECTION, SOLUTION INTRAVENOUS at 16:53

## 2022-05-13 RX ADMIN — FAMOTIDINE 20 MG: 10 INJECTION, SOLUTION INTRAVENOUS at 08:21

## 2022-05-13 RX ADMIN — INSULIN LISPRO 3 UNITS: 100 INJECTION, SOLUTION INTRAVENOUS; SUBCUTANEOUS at 14:53

## 2022-05-13 RX ADMIN — SODIUM CHLORIDE, POTASSIUM CHLORIDE, SODIUM LACTATE AND CALCIUM CHLORIDE: 600; 310; 30; 20 INJECTION, SOLUTION INTRAVENOUS at 01:24

## 2022-05-13 RX ADMIN — INSULIN LISPRO 3 UNITS: 100 INJECTION, SOLUTION INTRAVENOUS; SUBCUTANEOUS at 02:40

## 2022-05-13 RX ADMIN — ENOXAPARIN SODIUM 40 MG: 100 INJECTION SUBCUTANEOUS at 10:08

## 2022-05-13 RX ADMIN — MORPHINE SULFATE 2 MG: 2 INJECTION, SOLUTION INTRAMUSCULAR; INTRAVENOUS at 10:31

## 2022-05-13 RX ADMIN — MAGNESIUM SULFATE HEPTAHYDRATE 2000 MG: 40 INJECTION, SOLUTION INTRAVENOUS at 08:24

## 2022-05-13 RX ADMIN — ACETAMINOPHEN 1000 MG: 500 TABLET ORAL at 21:48

## 2022-05-13 RX ADMIN — MORPHINE SULFATE 4 MG: 4 INJECTION, SOLUTION INTRAMUSCULAR; INTRAVENOUS at 16:49

## 2022-05-13 RX ADMIN — MAGNESIUM SULFATE HEPTAHYDRATE 2000 MG: 40 INJECTION, SOLUTION INTRAVENOUS at 10:09

## 2022-05-13 RX ADMIN — ROSUVASTATIN CALCIUM 5 MG: 10 TABLET, FILM COATED ORAL at 21:48

## 2022-05-13 RX ADMIN — INSULIN LISPRO 3 UNITS: 100 INJECTION, SOLUTION INTRAVENOUS; SUBCUTANEOUS at 06:26

## 2022-05-13 RX ADMIN — ACETAMINOPHEN 1000 MG: 500 TABLET ORAL at 14:48

## 2022-05-13 ASSESSMENT — PAIN SCALES - WONG BAKER
WONGBAKER_NUMERICALRESPONSE: 0

## 2022-05-13 ASSESSMENT — PAIN SCALES - GENERAL
PAINLEVEL_OUTOF10: 5
PAINLEVEL_OUTOF10: 7
PAINLEVEL_OUTOF10: 0
PAINLEVEL_OUTOF10: 4
PAINLEVEL_OUTOF10: 5
PAINLEVEL_OUTOF10: 5
PAINLEVEL_OUTOF10: 7
PAINLEVEL_OUTOF10: 7
PAINLEVEL_OUTOF10: 5

## 2022-05-13 ASSESSMENT — PAIN DESCRIPTION - ONSET: ONSET: GRADUAL

## 2022-05-13 ASSESSMENT — PAIN DESCRIPTION - LOCATION
LOCATION: ABDOMEN
LOCATION: ABDOMEN

## 2022-05-13 ASSESSMENT — PAIN DESCRIPTION - PAIN TYPE: TYPE: ACUTE PAIN;SURGICAL PAIN

## 2022-05-13 ASSESSMENT — PAIN DESCRIPTION - DESCRIPTORS
DESCRIPTORS: ACHING
DESCRIPTORS: ACHING

## 2022-05-13 ASSESSMENT — PAIN - FUNCTIONAL ASSESSMENT: PAIN_FUNCTIONAL_ASSESSMENT: PREVENTS OR INTERFERES SOME ACTIVE ACTIVITIES AND ADLS

## 2022-05-13 ASSESSMENT — PAIN DESCRIPTION - FREQUENCY: FREQUENCY: CONTINUOUS

## 2022-05-13 ASSESSMENT — ENCOUNTER SYMPTOMS
ABDOMINAL PAIN: 1
SHORTNESS OF BREATH: 0

## 2022-05-13 NOTE — CARE COORDINATION
them.  She stated [facility] supplied her colostomy dme and unsure of company that was used.      Will follow closely to see if any home care needs develop    Electronically signed by Lyndon Hassan RN on 5/13/22 at 3:15 PM EDT

## 2022-05-13 NOTE — FLOWSHEET NOTE
Patient is sleeping. Writer offers a prayer for healing and leaves a note of support. Patient does not respond and no family is present. Spiritual Care will follow as needed. 05/13/22 1233   Encounter Summary   Service Provided For: Patient   Referral/Consult From: [facility]   Support System Spouse; Family members   Last Encounter  05/13/22  (Sleeping)   Complexity of Encounter Low   Begin Time 1200   End Time  1210   Total Time Calculated 10 min   Encounter    Type Initial Screen/Assessment   Assessment/Intervention/Outcome   Assessment Unable to assess   Intervention Prayer (assurance of)/[place]   Outcome Did not respond

## 2022-05-13 NOTE — PLAN OF CARE
Problem: Safety - Adult  Goal: Free from fall injury  5/13/2022 1220 by Tomeka Mays RN  Outcome: Progressing    Patient has remained free from falls this shift. Patient is alert and oriented times four. Bed to lowest position with door open. Patient care items and call light in reach. Patient uses call light appropriately for assist. Will continue to monitor. Please see fall assessment.

## 2022-05-13 NOTE — PLAN OF CARE
Ana Dietz is resting well this shift with some report of pain; scheduled Tylenol and PRNs effective to reduce pain to tolerable level. She has DELIO drain in tact draining sanguineous drainage. Santo draining clear, yellow urine. Abdominal binder in place. She has call light in reach; safety maintained.   Problem: Pain  Goal: Verbalizes/displays adequate comfort level or baseline comfort level  Outcome: Progressing     Problem: Safety - Adult  Goal: Free from fall injury  Outcome: Progressing     Problem: Cardiovascular - Adult  Goal: Absence of cardiac dysrhythmias or at baseline  Outcome: Progressing     Problem: Skin/Tissue Integrity - Adult  Goal: Incisions, wounds, or drain sites healing without S/S of infection  Outcome: Progressing     Problem: Musculoskeletal - Adult  Goal: Return ADL status to a safe level of function  Outcome: Progressing     Problem: Gastrointestinal - Adult  Goal: Minimal or absence of nausea and vomiting  Outcome: Progressing     Problem: Genitourinary - Adult  Goal: Urinary catheter remains patent  Outcome: Progressing     Problem: Infection - Adult  Goal: Absence of infection during hospitalization  Outcome: Progressing     Problem: Metabolic/Fluid and Electrolytes - Adult  Goal: Glucose maintained within prescribed range  Outcome: Progressing     Problem: ABCDS Injury Assessment  Goal: Absence of physical injury  Recent Flowsheet Documentation  Taken 5/13/2022 0926 by Hilary Grey  Absence of Physical Injury: Implement safety measures based on patient assessment

## 2022-05-13 NOTE — PROGRESS NOTES
Comprehensive Nutrition Assessment    Type and Reason for Visit:  Initial,Positive Nutrition Screen (34lb or > weight loss, decreased appetite, malnutrition score: 5)    Nutrition Recommendations/Plan:   1. Continue NPO status   2. Add Ensure supplements once diet advances  3. Monitor diet advancement/tolerance, GI function, weights, and labs      Malnutrition Assessment:  Malnutrition Status:  Severe malnutrition (05/13/22 1542)    Context:  Chronic Illness     Findings of the 6 clinical characteristics of malnutrition:  Energy Intake:  75% or less estimated energy requirements for 1 month or longer  Weight Loss:  Greater than 20% over 1 year     Body Fat Loss:  Unable to assess     Muscle Mass Loss:  Unable to assess    Fluid Accumulation:  No significant fluid accumulation     Strength:  Not Performed    Nutrition Assessment:    Patient admission r/t colostomy reversal 5/12/2022. Patient has history of left lower quadrant diverticulitis with abscess. She underwent resection of sigmoid colon with colostomy on 12/9/2022 for diverticulitis with abscess. She reports # >1 year ago. She reports decreased appetite x months -- usually consumes 2 meals per day. No nutritional shakes. Sometimes takes MVI. She reports no N/V. She is agreeable to ensure shakes once her diet advances. Monitor for diet advancement, GI status, and labs. Nutrition Related Findings:    Absent bowel sounds. No edema. No N/V. Colostomy reversal 5/12/22. Wound Type: Surgical Incision       Current Nutrition Intake & Therapies:    Average Meal Intake: NPO  Average Supplements Intake: NPO  Diet NPO Exceptions are: Ice Chips    Anthropometric Measures:  Height: 5' 8\" (172.7 cm)  Ideal Body Weight (IBW): 140 lbs (64 kg)    Admission Body Weight: 190 lb 0.6 oz (86.2 kg)  Current Body Weight: 190 lb 0.6 oz (86.2 kg), 135.7 % IBW. Weight Source:  Other (Comment) (Actual)  Current BMI (kg/m2): 28.9  Usual Body Weight: 270 lb (122.5 kg) (stated weight > 1 year ago)  % Weight Change (Calculated): -29.6                    BMI Categories: Overweight (BMI 25.0-29. 9)    Estimated Daily Nutrient Needs:  Energy Requirements Based On: Kcal/kg  Weight Used for Energy Requirements: Current  Energy (kcal/day): 3574-1399 kcal (23-25 kcal/kg)  Weight Used for Protein Requirements: Ideal  Protein (g/day): 83-95 gm protein (1.3-1.5 g/kg)       Nutrition Diagnosis:   · Severe malnutrition related to altered GI function (diverticulitis with absess; s/p colostomy reversal) as evidenced by NPO or clear liquid status due to medical condition,poor intake prior to admission,weight loss greater than or equal to 20% in 1 year,Criteria as identified in malnutrition assessment      Nutrition Interventions:   Food and/or Nutrient Delivery: Continue NPO  Nutrition Education/Counseling: Education not indicated  Coordination of Nutrition Care: Continue to monitor while inpatient       Goals:     Goals: Initiate PO diet,by next RD assessment       Nutrition Monitoring and Evaluation:   Behavioral-Environmental Outcomes: None Identified  Food/Nutrient Intake Outcomes: Diet Advancement/Tolerance  Physical Signs/Symptoms Outcomes: Biochemical Data,GI Status,Skin,Weight    Discharge Planning:     Too soon to determine     [facility], [address] [place] [address], [address] [place] [address]  Office Number: [phone]

## 2022-05-13 NOTE — PROGRESS NOTES
Santo catheter dc'd without difficulty, pt walked in room and assisted to chair, pt did not want to get out of bed but staff convinced her she needed to, family at side, will continue to monitor

## 2022-05-13 NOTE — CARE COORDINATION
Advance Care Planning     Advance Care Planning Activator (Inpatient)  Conversation Note      Date of ACP Conversation: 5/13/2022     Conversation Conducted with: Patient with Decision Making Capacity    ACP Activator: Tyra Warren RN        Health Care Decision Maker:     Current Designated Health Care Decision Maker:     Primary Decision Maker: Manda Cordova - [phone]  Click here to complete Healthcare Decision Makers including section of the Healthcare Decision Maker Relationship (ie \"Primary\")  Today we documented Decision Maker(s) consistent with Legal Next of Kin hierarchy. Care Preferences    Ventilation: \"If you were in your present state of health and suddenly became very ill and were unable to breathe on your own, what would your preference be about the use of a ventilator (breathing machine) if it were available to you? \"      Would the patient desire the use of ventilator (breathing machine)?: yes    \"If your health worsens and it becomes clear that your chance of recovery is unlikely, what would your preference be about the use of a ventilator (breathing machine) if it were available to you? \"     Would the patient desire the use of ventilator (breathing machine)?: Yes      Resuscitation  \"CPR works best to restart the heart when there is a sudden event, like a heart attack, in someone who is otherwise healthy. Unfortunately, CPR does not typically restart the heart for people who have serious health conditions or who are very sick. \"    \"In the event your heart stopped as a result of an underlying serious health condition, would you want attempts to be made to restart your heart (answer \"yes\" for attempt to resuscitate) or would you prefer a natural death (answer \"no\" for do not attempt to resuscitate)? \" yes       [x] Yes   [] No   Educated Patient / Amanda Howard regarding differences between Advance Directives and portable DNR orders.     Length of ACP Conversation in minutes: Conversation Outcomes:  [x] ACP discussion completed  [] Existing advance directive reviewed with patient; no changes to patient's previously recorded wishes  [] New Advance Directive completed  [] Portable Do Not Rescitate prepared for Provider review and signature  [] POLST/POST/MOLST/MOST prepared for Provider review and signature      Follow-up plan:    [] Schedule follow-up conversation to continue planning  [] Referred individual to Provider for additional questions/concerns   [] Advised patient/agent/surrogate to review completed ACP document and update if needed with changes in condition, patient preferences or care setting    [] This note routed to one or more involved healthcare providers

## 2022-05-13 NOTE — PROGRESS NOTES
[facility] Pharmacy Service   Medication Review    The patient's list of current home medications has been reviewed. Source(s) of information: spoke to patient and sure scripts     Based on information provided by the above source(s), I have updated the patient's home med list as described below. I changed or updated the following medications on the patient's home medication list:  Discontinued docusate sodium (COLACE) 100 MG capsule     Added None      Adjusted   None     Other Notes Patient filled famotidine 20 mg on 5/5/22 (#60 for 30 day supply). She stated that she was not taking this medication at home and didn't know that she had filled it. Please feel free to call me with any questions about this encounter. Thank you. This note will be reviewed and co-signed by the [facility] Pharmacist. The pharmacist will review inpatient orders and contact the physician about any discrepancies. Antwan Tillman, pharmacy technician  [facility]  Phone:  [phone]  Fax: [phone]      Electronically signed by Antwan Tillman on 5/13/2022 at 11:32 AM         Prior to Admission medications    Medication Sig Start Date End Date Taking?  Authorizing Provider   Cholecalciferol (VITAMIN D) 50 MCG (2000 UT) CAPS capsule Take 1 capsule by mouth daily       BIOTIN PO Take 1 tablet by mouth daily       rosuvastatin (CRESTOR) 5 MG tablet Take 5 mg by mouth nightly       metFORMIN (GLUCOPHAGE-XR) 750 MG extended release tablet Take 750 mg by mouth daily (with breakfast)        omeprazole (PRILOSEC) 40 MG capsule Take 40 mg by mouth daily as needed        Multiple Vitamins-Minerals (THERAPEUTIC MULTIVITAMIN-MINERALS) tablet Take 1 tablet by mouth daily       albuterol sulfate  (90 BASE) MCG/ACT inhaler Inhale 2 puffs into the lungs every 6 hours as needed for Wheezing

## 2022-05-13 NOTE — PROGRESS NOTES
General Surgery:  Daily Progress Note          status post colostomy reversal          PATIENT NAME: Cornell Akbar     TODAY'S DATE: 5/13/2022, 9:18 AM  CC: Nominal pain    SUBJECTIVE:     Pt seen and examined at bedside. No acute events overnight. Patient states she feels abdominal cramping. Urine output overnight 0.2 cc/kg/h. Additional 1 L bolus given. afebrile vital signs within normal limits. Mild nausea no vomiting. No flatus or bowel movement. Review of Systems   Constitutional: Positive for fatigue. Respiratory: Negative for shortness of breath. Cardiovascular: Negative for chest pain. Gastrointestinal: Positive for abdominal pain. Neurological: Negative for headaches. Psychiatric/Behavioral: Negative for agitation. OBJECTIVE:   VITALS:  /70   Pulse 90   Temp 98.2 °F (36.8 °C) (Oral)   Resp 18   Ht 5' 8\" (1.727 m)   Wt 190 lb 0.6 oz (86.2 kg)   SpO2 100%   BMI 28.89 kg/m²      INTAKE/OUTPUT:      Intake/Output Summary (Last 24 hours) at 5/13/2022 0918  Last data filed at 5/13/2022 2627  Gross per 24 hour   Intake 3298.85 ml   Output 790 ml   Net 2508.85 ml       PHYSICAL EXAM:  General Appearance: awake, alert, oriented, in no acute distress  HEENT:  Normocephalic, atraumatic, mucus membranes moist   Heart: Heart regular rate and rhythm  Lungs: Normal expansion. No  wheezing. Abdomen: Soft, mildly distended, appropriately tender to palpation  Incision: Surgical dressing small strikethrough at lateral aspect. Extremities: No cyanosis, pitting edema, rashes noted. Skin: Skin color, texture, turgor normal. No rashes or lesions. IV Access:  PIV   Santo:   In place       Data:  CBC with Differential:    Lab Results   Component Value Date    WBC 11.5 05/13/2022    RBC 4.16 05/13/2022    HGB 11.8 05/13/2022    HCT 37.3 05/13/2022     05/13/2022    MCV 89.7 05/13/2022    MCH 28.4 05/13/2022    MCHC 31.6 05/13/2022    RDW 15.5 05/13/2022    LYMPHOPCT 13 05/13/2022 MONOPCT 8 05/13/2022    BASOPCT 0 05/13/2022    MONOSABS 0.92 05/13/2022    LYMPHSABS 1.45 05/13/2022    EOSABS <0.03 05/13/2022    BASOSABS <0.03 05/13/2022    DIFFTYPE NOT REPORTED 12/14/2021     BMP:    Lab Results   Component Value Date     05/13/2022    K 3.8 05/13/2022     05/13/2022    CO2 24 05/13/2022    BUN 13 05/13/2022    LABALBU 2.9 10/25/2021    CREATININE 0.56 05/13/2022    CALCIUM 8.0 05/13/2022    GFRAA >60 05/13/2022    LABGLOM >60 05/13/2022    GLUCOSE 149 05/13/2022       Radiology Review: No new imaging to review    ASSESSMENT:  Active Hospital Problems    Diagnosis Date Noted    History of colostomy reversal [Z98.890] 05/12/2022     Priority: Medium       1. 73 y.o. female status post colostomy reversal on 5/12/2022. Plan:  1. Diet: Keep n.p.o.  2. Analgesia: Morphine, oxycodone, Tylenol  3. GI prophylaxis: Pepcid IV  4. DVT prophylaxis:  Lovenox qd  5. Remove Santo catheter, continue to monitor urine output  6. Activity:  Continue to encourage ambulation/activity   7. Continue to encourage incentive spirometry  8. Wound care: takedown surgical dressing POD#2  9. DC planning: Anticipate discharge home with return of bowel function. Electronically signed by Keisha Flood MD  on 5/13/2022 at 9:18 AM   Attending Physician Statement  I have discussed the case, including pertinent history and exam findings with the resident. I agree with the assessment, plan and orders as documented by the resident. Encourage IS and ambulation  Daily packing with Iodoform gauze .

## 2022-05-14 LAB
ANION GAP SERPL CALCULATED.3IONS-SCNC: 9 MMOL/L (ref 9–17)
BUN BLDV-MCNC: 8 MG/DL (ref 8–23)
BUN/CREAT BLD: 17 (ref 9–20)
CALCIUM SERPL-MCNC: 8.4 MG/DL (ref 8.6–10.4)
CHLORIDE BLD-SCNC: 105 MMOL/L (ref 98–107)
CO2: 24 MMOL/L (ref 20–31)
CREAT SERPL-MCNC: 0.48 MG/DL (ref 0.5–0.9)
GFR AFRICAN AMERICAN: >60 ML/MIN
GFR NON-AFRICAN AMERICAN: >60 ML/MIN
GFR SERPL CREATININE-BSD FRML MDRD: ABNORMAL ML/MIN/{1.73_M2}
GLUCOSE BLD-MCNC: 102 MG/DL (ref 65–105)
GLUCOSE BLD-MCNC: 112 MG/DL (ref 65–105)
GLUCOSE BLD-MCNC: 121 MG/DL (ref 65–105)
GLUCOSE BLD-MCNC: 133 MG/DL (ref 65–105)
GLUCOSE BLD-MCNC: 133 MG/DL (ref 65–105)
GLUCOSE BLD-MCNC: 137 MG/DL (ref 70–99)
GLUCOSE BLD-MCNC: 148 MG/DL (ref 65–105)
MAGNESIUM: 2 MG/DL (ref 1.6–2.6)
POTASSIUM SERPL-SCNC: 3.9 MMOL/L (ref 3.7–5.3)
SODIUM BLD-SCNC: 138 MMOL/L (ref 135–144)

## 2022-05-14 PROCEDURE — 2500000003 HC RX 250 WO HCPCS: Performed by: STUDENT IN AN ORGANIZED HEALTH CARE EDUCATION/TRAINING PROGRAM

## 2022-05-14 PROCEDURE — 83735 ASSAY OF MAGNESIUM: CPT

## 2022-05-14 PROCEDURE — 6360000002 HC RX W HCPCS: Performed by: STUDENT IN AN ORGANIZED HEALTH CARE EDUCATION/TRAINING PROGRAM

## 2022-05-14 PROCEDURE — 2580000003 HC RX 258: Performed by: STUDENT IN AN ORGANIZED HEALTH CARE EDUCATION/TRAINING PROGRAM

## 2022-05-14 PROCEDURE — 80048 BASIC METABOLIC PNL TOTAL CA: CPT

## 2022-05-14 PROCEDURE — 6370000000 HC RX 637 (ALT 250 FOR IP): Performed by: STUDENT IN AN ORGANIZED HEALTH CARE EDUCATION/TRAINING PROGRAM

## 2022-05-14 PROCEDURE — 36415 COLL VENOUS BLD VENIPUNCTURE: CPT

## 2022-05-14 PROCEDURE — 1200000000 HC SEMI PRIVATE

## 2022-05-14 PROCEDURE — 82947 ASSAY GLUCOSE BLOOD QUANT: CPT

## 2022-05-14 PROCEDURE — A4216 STERILE WATER/SALINE, 10 ML: HCPCS | Performed by: STUDENT IN AN ORGANIZED HEALTH CARE EDUCATION/TRAINING PROGRAM

## 2022-05-14 RX ORDER — CYCLOBENZAPRINE HCL 10 MG
10 TABLET ORAL 3 TIMES DAILY PRN
Status: DISCONTINUED | OUTPATIENT
Start: 2022-05-14 | End: 2022-05-15

## 2022-05-14 RX ORDER — DEXTROSE, SODIUM CHLORIDE, AND POTASSIUM CHLORIDE 5; .45; .15 G/100ML; G/100ML; G/100ML
INJECTION INTRAVENOUS CONTINUOUS
Status: DISCONTINUED | OUTPATIENT
Start: 2022-05-14 | End: 2022-05-17

## 2022-05-14 RX ADMIN — ACETAMINOPHEN 1000 MG: 500 TABLET ORAL at 05:56

## 2022-05-14 RX ADMIN — SODIUM CHLORIDE, PRESERVATIVE FREE 10 ML: 5 INJECTION INTRAVENOUS at 21:02

## 2022-05-14 RX ADMIN — ONDANSETRON 4 MG: 2 INJECTION INTRAMUSCULAR; INTRAVENOUS at 17:18

## 2022-05-14 RX ADMIN — POTASSIUM CHLORIDE, DEXTROSE MONOHYDRATE AND SODIUM CHLORIDE: 150; 5; 450 INJECTION, SOLUTION INTRAVENOUS at 14:40

## 2022-05-14 RX ADMIN — ROSUVASTATIN CALCIUM 5 MG: 10 TABLET, FILM COATED ORAL at 21:09

## 2022-05-14 RX ADMIN — FAMOTIDINE 20 MG: 10 INJECTION, SOLUTION INTRAVENOUS at 08:06

## 2022-05-14 RX ADMIN — ENOXAPARIN SODIUM 40 MG: 100 INJECTION SUBCUTANEOUS at 08:06

## 2022-05-14 RX ADMIN — INSULIN LISPRO 3 UNITS: 100 INJECTION, SOLUTION INTRAVENOUS; SUBCUTANEOUS at 22:19

## 2022-05-14 RX ADMIN — ACETAMINOPHEN 1000 MG: 500 TABLET ORAL at 21:02

## 2022-05-14 RX ADMIN — FAMOTIDINE 20 MG: 10 INJECTION, SOLUTION INTRAVENOUS at 21:02

## 2022-05-14 RX ADMIN — MORPHINE SULFATE 2 MG: 2 INJECTION, SOLUTION INTRAMUSCULAR; INTRAVENOUS at 11:35

## 2022-05-14 ASSESSMENT — PAIN SCALES - GENERAL: PAINLEVEL_OUTOF10: 6

## 2022-05-14 ASSESSMENT — PAIN SCALES - WONG BAKER

## 2022-05-14 ASSESSMENT — ENCOUNTER SYMPTOMS
VOMITING: 0
EYE PAIN: 0
SHORTNESS OF BREATH: 0
NAUSEA: 1
ABDOMINAL PAIN: 1
WHEEZING: 0

## 2022-05-14 NOTE — PROGRESS NOTES
General Surgery:  Daily Progress Note          status post colostomy reversal          PATIENT NAME: Alan Walker     TODAY'S DATE: 5/14/2022, 1:28 PM  CC: Nominal pain    SUBJECTIVE:     Patient was seen and evaluated at bedside. No acute events overnight. Patient complains of moderate abdominal pain. Afebrile vital signs stable. Mild nausea no emesis. No bowel function DELIO 105 ml/24 hr    Review of Systems   Constitutional: Positive for fatigue. Negative for chills. HENT: Negative for ear discharge. Eyes: Negative for pain. Respiratory: Negative for shortness of breath and wheezing. Cardiovascular: Negative for chest pain. Gastrointestinal: Positive for abdominal pain and nausea. Negative for vomiting. Genitourinary: Negative for urgency. Neurological: Negative for headaches. Psychiatric/Behavioral: Negative for agitation. OBJECTIVE:   VITALS:  /79   Pulse 92   Temp 98.4 °F (36.9 °C) (Oral)   Resp 16   Ht 5' 8\" (1.727 m)   Wt 190 lb 0.6 oz (86.2 kg)   SpO2 93%   BMI 28.89 kg/m²      INTAKE/OUTPUT:      Intake/Output Summary (Last 24 hours) at 5/14/2022 1328  Last data filed at 5/14/2022 0558  Gross per 24 hour   Intake 1361.72 ml   Output 1405 ml   Net -43.28 ml       PHYSICAL EXAM:  General Appearance: awake, alert, oriented, in no acute distress  HEENT:  Normocephalic, atraumatic, mucus membranes moist   Heart: Heart regular rate and rhythm  Lungs: Normal expansion. No  wheezing. Abdomen: Soft, mildly distended, appropriately tender to palpation  Incision: Surgical dressing small strikethrough at lateral aspect. Extremities: No cyanosis, pitting edema, rashes noted. Skin: Skin color, texture, turgor normal. No rashes or lesions. IV Access:  PIV   Santo:   In place       Data:  CBC with Differential:    Lab Results   Component Value Date    WBC 11.5 05/13/2022    RBC 4.16 05/13/2022    HGB 11.8 05/13/2022    HCT 37.3 05/13/2022     05/13/2022    MCV 89.7 05/13/2022    MCH 28.4 05/13/2022    MCHC 31.6 05/13/2022    RDW 15.5 05/13/2022    LYMPHOPCT 13 05/13/2022    MONOPCT 8 05/13/2022    BASOPCT 0 05/13/2022    MONOSABS 0.92 05/13/2022    LYMPHSABS 1.45 05/13/2022    EOSABS <0.03 05/13/2022    BASOSABS <0.03 05/13/2022    DIFFTYPE NOT REPORTED 12/14/2021     BMP:    Lab Results   Component Value Date     05/14/2022    K 3.9 05/14/2022     05/14/2022    CO2 24 05/14/2022    BUN 8 05/14/2022    LABALBU 2.9 10/25/2021    CREATININE 0.48 05/14/2022    CALCIUM 8.4 05/14/2022    GFRAA >60 05/14/2022    LABGLOM >60 05/14/2022    GLUCOSE 137 05/14/2022       Radiology Review: No new imaging to review    ASSESSMENT:  Active Hospital Problems    Diagnosis Date Noted    History of colostomy reversal [Z98.890] 05/12/2022     Priority: Medium       73 y.o. female status post colostomy reversal on 5/12/2022. Plan:  Diet: Keep n.p.o. sips with meds  Analgesia: Morphine, oxycodone, Tylenol, flexeril  GI prophylaxis: Pepcid IV  DVT prophylaxis:  Lovenox qd  Remove Santo catheter, continue to monitor urine output  Activity:  Continue to encourage ambulation/activity    Continue to encourage incentive spirometry   Wound care: Daily packing changes with 1/4 inch iodoform gauze cover with ABD and paper tape. DC planning: Anticipate discharge home with return of bowel function. Electronically signed by Raquel Betancourt MD  on 5/14/2022 at 1:28 PM     General Surgery Attending Attestation Note    Patient was seen and examined. I agree with the above resident's exam, assessment and plan.      Doing ok  Up in chair today  Needs to ambulate more  Await bowel function    Veronika Puente, [address], [facility], [facility]  Office: [phone]  After Hours: Please call answering service

## 2022-05-14 NOTE — PLAN OF CARE
Problem: Safety - Adult  Goal: Free from fall injury  Outcome: Progressing       Patient has remained free from falls this shift. Patient is alert and oriented times four. Bed to lowest position with door open. Patient care items and call light in reach. Patient uses call light appropriately for assist. Will continue to monitor. Please see fall assessment.

## 2022-05-14 NOTE — PLAN OF CARE
Galindo Serum is resting well this shift with report of tolerable pain level. She ambulated to bathroom with SBA and walker. Tolerated activity well. She is voiding with no difficulty. She denies passing gas and has not had BM to this point. She is afebrile and shows no s/s of infection; surgical dressing in tact to abdomen and abdominal binder in place. She continues to be normal sinus on monitor. No insulin indicated as glucose level has been WNL to this point. She has call light in reach; safety maintained.     Problem: Pain  Goal: Verbalizes/displays adequate comfort level or baseline comfort level  Outcome: Progressing     Problem: Safety - Adult  Goal: Free from fall injury  5/14/2022 0042 by Aura Wallace  Outcome: Progressing  5/13/2022 1220 by Divine Al RN  Outcome: Progressing     Problem: Neurosensory - Adult  Goal: Achieves maximal functionality and self care  Outcome: Progressing     Problem: Cardiovascular - Adult  Goal: Absence of cardiac dysrhythmias or at baseline  Outcome: Progressing     Problem: Skin/Tissue Integrity - Adult  Goal: Incisions, wounds, or drain sites healing without S/S of infection  Outcome: Progressing  Flowsheets (Taken 5/13/2022 2200)  Incisions, Wounds, or Drain Sites Healing Without Sign and Symptoms of Infection:   TWICE DAILY: Assess and document skin integrity   TWICE DAILY: Assess and document dressing/incision, wound bed, drain sites and surrounding tissue     Problem: Musculoskeletal - Adult  Goal: Return ADL status to a safe level of function  Outcome: Progressing  Flowsheets (Taken 5/13/2022 2200)  Return ADL Status to a Safe Level of Function:   Administer medication as ordered   Assess activities of daily living deficits and provide assistive devices as needed     Problem: Gastrointestinal - Adult  Goal: Minimal or absence of nausea and vomiting  Outcome: Progressing  Flowsheets (Taken 5/13/2022 2200)  Minimal or absence of nausea and vomiting:   Administer ordered antiemetic medications as needed   Advance diet as tolerated, if ordered     Problem: Genitourinary - Adult  Goal: Absence of urinary retention  Outcome: Progressing  Flowsheets (Taken 5/13/2022 2200)  Absence of urinary retention:   Assess patients ability to void and empty bladder   Monitor intake/output and perform bladder scan as needed     Problem: Infection - Adult  Goal: Absence of infection during hospitalization  Outcome: Progressing  Flowsheets (Taken 5/13/2022 2200)  Absence of infection during hospitalization:   Assess and monitor for signs and symptoms of infection   Monitor lab/diagnostic results   Monitor all insertion sites i.e., indwelling lines, tubes and drains     Problem: Metabolic/Fluid and Electrolytes - Adult  Goal: Glucose maintained within prescribed range  Outcome: Progressing  Flowsheets (Taken 5/13/2022 2200)  Glucose maintained within prescribed range:   Monitor blood glucose as ordered   Assess for signs and symptoms of hyperglycemia and hypoglycemia   Administer ordered medications to maintain glucose within target range     Problem: Chronic Conditions and Co-morbidities  Goal: Patient's chronic conditions and co-morbidity symptoms are monitored and maintained or improved  Recent Flowsheet Documentation  Taken 5/13/2022 2200 by [email or web address] - Patient's Chronic Conditions and Co-Morbidity Symptoms are Monitored and Maintained or Improved: Monitor and assess patient's chronic conditions and comorbid symptoms for stability, deterioration, or improvement     Problem: ABCDS Injury Assessment  Goal: Absence of physical injury  Recent Flowsheet Documentation  Taken 5/13/2022 1944 by Renita Lou  Absence of Physical Injury: Implement safety measures based on patient assessment     Problem: Musculoskeletal - Adult  Goal: Return mobility to safest level of function  Recent Flowsheet Documentation  Taken 5/13/2022 2200 by Renita Lou  Return Mobility to Safest Level of Function:   Assess patient stability and activity tolerance for standing, transferring and ambulating with or without assistive devices   Assist with transfers and ambulation using safe patient handling equipment as needed   Ensure adequate protection for wounds/incisions during mobilization     Problem: Gastrointestinal - Adult  Goal: Maintains or returns to baseline bowel function  Recent Flowsheet Documentation  Taken 5/13/2022 2200 by Lady Rodriguez or returns to baseline bowel function:   Assess bowel function   Encourage mobilization and activity     Problem: Hematologic - Adult  Goal: Maintains hematologic stability  Recent Flowsheet Documentation  Taken 5/13/2022 2200 by Fifi Ortiz  Maintains hematologic stability:   Assess for signs and symptoms of bleeding or hemorrhage   Monitor labs for bleeding or clotting disorders

## 2022-05-15 LAB
ABSOLUTE EOS #: 0.18 K/UL (ref 0–0.44)
ABSOLUTE IMMATURE GRANULOCYTE: 0.03 K/UL (ref 0–0.3)
ABSOLUTE LYMPH #: 0.83 K/UL (ref 1.1–3.7)
ABSOLUTE MONO #: 0.79 K/UL (ref 0.1–1.2)
ANION GAP SERPL CALCULATED.3IONS-SCNC: 8 MMOL/L (ref 9–17)
BASOPHILS # BLD: 0 % (ref 0–2)
BASOPHILS ABSOLUTE: <0.03 K/UL (ref 0–0.2)
BUN BLDV-MCNC: 6 MG/DL (ref 8–23)
BUN/CREAT BLD: 11 (ref 9–20)
CALCIUM SERPL-MCNC: 8.3 MG/DL (ref 8.6–10.4)
CHLORIDE BLD-SCNC: 107 MMOL/L (ref 98–107)
CO2: 25 MMOL/L (ref 20–31)
CREAT SERPL-MCNC: 0.53 MG/DL (ref 0.5–0.9)
EOSINOPHILS RELATIVE PERCENT: 2 % (ref 1–4)
GFR AFRICAN AMERICAN: >60 ML/MIN
GFR NON-AFRICAN AMERICAN: >60 ML/MIN
GFR SERPL CREATININE-BSD FRML MDRD: ABNORMAL ML/MIN/{1.73_M2}
GLUCOSE BLD-MCNC: 103 MG/DL (ref 65–105)
GLUCOSE BLD-MCNC: 105 MG/DL (ref 65–105)
GLUCOSE BLD-MCNC: 105 MG/DL (ref 70–99)
GLUCOSE BLD-MCNC: 151 MG/DL (ref 65–105)
GLUCOSE BLD-MCNC: 159 MG/DL (ref 65–105)
GLUCOSE BLD-MCNC: 99 MG/DL (ref 65–105)
HCT VFR BLD CALC: 33.6 % (ref 36.3–47.1)
HEMOGLOBIN: 10.5 G/DL (ref 11.9–15.1)
IMMATURE GRANULOCYTES: 0 %
LYMPHOCYTES # BLD: 10 % (ref 24–43)
MAGNESIUM: 1.8 MG/DL (ref 1.6–2.6)
MCH RBC QN AUTO: 28.3 PG (ref 25.2–33.5)
MCHC RBC AUTO-ENTMCNC: 31.3 G/DL (ref 28.4–34.8)
MCV RBC AUTO: 90.6 FL (ref 82.6–102.9)
MONOCYTES # BLD: 10 % (ref 3–12)
NRBC AUTOMATED: 0 PER 100 WBC
PDW BLD-RTO: 15.3 % (ref 11.8–14.4)
PLATELET # BLD: 237 K/UL (ref 138–453)
PMV BLD AUTO: 9.5 FL (ref 8.1–13.5)
POTASSIUM SERPL-SCNC: 3.8 MMOL/L (ref 3.7–5.3)
RBC # BLD: 3.71 M/UL (ref 3.95–5.11)
RBC # BLD: ABNORMAL 10*6/UL
SEG NEUTROPHILS: 78 % (ref 36–65)
SEGMENTED NEUTROPHILS ABSOLUTE COUNT: 6.15 K/UL (ref 1.5–8.1)
SODIUM BLD-SCNC: 140 MMOL/L (ref 135–144)
WBC # BLD: 8 K/UL (ref 3.5–11.3)

## 2022-05-15 PROCEDURE — 97162 PT EVAL MOD COMPLEX 30 MIN: CPT

## 2022-05-15 PROCEDURE — 97530 THERAPEUTIC ACTIVITIES: CPT

## 2022-05-15 PROCEDURE — 6370000000 HC RX 637 (ALT 250 FOR IP): Performed by: STUDENT IN AN ORGANIZED HEALTH CARE EDUCATION/TRAINING PROGRAM

## 2022-05-15 PROCEDURE — 83735 ASSAY OF MAGNESIUM: CPT

## 2022-05-15 PROCEDURE — 2500000003 HC RX 250 WO HCPCS: Performed by: STUDENT IN AN ORGANIZED HEALTH CARE EDUCATION/TRAINING PROGRAM

## 2022-05-15 PROCEDURE — 85025 COMPLETE CBC W/AUTO DIFF WBC: CPT

## 2022-05-15 PROCEDURE — 1200000000 HC SEMI PRIVATE

## 2022-05-15 PROCEDURE — 2580000003 HC RX 258: Performed by: STUDENT IN AN ORGANIZED HEALTH CARE EDUCATION/TRAINING PROGRAM

## 2022-05-15 PROCEDURE — 6360000002 HC RX W HCPCS: Performed by: STUDENT IN AN ORGANIZED HEALTH CARE EDUCATION/TRAINING PROGRAM

## 2022-05-15 PROCEDURE — A4216 STERILE WATER/SALINE, 10 ML: HCPCS | Performed by: STUDENT IN AN ORGANIZED HEALTH CARE EDUCATION/TRAINING PROGRAM

## 2022-05-15 PROCEDURE — 36415 COLL VENOUS BLD VENIPUNCTURE: CPT

## 2022-05-15 PROCEDURE — 82947 ASSAY GLUCOSE BLOOD QUANT: CPT

## 2022-05-15 PROCEDURE — 80048 BASIC METABOLIC PNL TOTAL CA: CPT

## 2022-05-15 RX ORDER — ACETAMINOPHEN 160 MG/5ML
1000 SOLUTION ORAL EVERY 8 HOURS SCHEDULED
Status: DISCONTINUED | OUTPATIENT
Start: 2022-05-15 | End: 2022-05-16

## 2022-05-15 RX ORDER — MAGNESIUM SULFATE IN WATER 40 MG/ML
2000 INJECTION, SOLUTION INTRAVENOUS
Status: COMPLETED | OUTPATIENT
Start: 2022-05-15 | End: 2022-05-15

## 2022-05-15 RX ORDER — OXYCODONE HCL 5 MG/5 ML
5 SOLUTION, ORAL ORAL EVERY 6 HOURS PRN
Status: DISCONTINUED | OUTPATIENT
Start: 2022-05-15 | End: 2022-05-18 | Stop reason: HOSPADM

## 2022-05-15 RX ORDER — FENTANYL CITRATE 50 UG/ML
25 INJECTION, SOLUTION INTRAMUSCULAR; INTRAVENOUS
Status: DISCONTINUED | OUTPATIENT
Start: 2022-05-15 | End: 2022-05-17

## 2022-05-15 RX ADMIN — ENOXAPARIN SODIUM 40 MG: 100 INJECTION SUBCUTANEOUS at 08:14

## 2022-05-15 RX ADMIN — SODIUM CHLORIDE, PRESERVATIVE FREE 10 ML: 5 INJECTION INTRAVENOUS at 20:17

## 2022-05-15 RX ADMIN — MAGNESIUM SULFATE HEPTAHYDRATE 2000 MG: 40 INJECTION, SOLUTION INTRAVENOUS at 08:15

## 2022-05-15 RX ADMIN — ROSUVASTATIN CALCIUM 5 MG: 10 TABLET, FILM COATED ORAL at 20:14

## 2022-05-15 RX ADMIN — INSULIN LISPRO 3 UNITS: 100 INJECTION, SOLUTION INTRAVENOUS; SUBCUTANEOUS at 17:16

## 2022-05-15 RX ADMIN — MAGNESIUM SULFATE HEPTAHYDRATE 2000 MG: 40 INJECTION, SOLUTION INTRAVENOUS at 11:05

## 2022-05-15 RX ADMIN — FAMOTIDINE 20 MG: 10 INJECTION, SOLUTION INTRAVENOUS at 08:15

## 2022-05-15 RX ADMIN — INSULIN LISPRO 3 UNITS: 100 INJECTION, SOLUTION INTRAVENOUS; SUBCUTANEOUS at 22:28

## 2022-05-15 RX ADMIN — ACETAMINOPHEN ORAL SOLUTION 1000 MG: 325 SOLUTION ORAL at 14:31

## 2022-05-15 RX ADMIN — FAMOTIDINE 20 MG: 10 INJECTION, SOLUTION INTRAVENOUS at 20:11

## 2022-05-15 RX ADMIN — POTASSIUM CHLORIDE, DEXTROSE MONOHYDRATE AND SODIUM CHLORIDE: 150; 5; 450 INJECTION, SOLUTION INTRAVENOUS at 14:38

## 2022-05-15 RX ADMIN — ACETAMINOPHEN ORAL SOLUTION 1000 MG: 325 SOLUTION ORAL at 20:11

## 2022-05-15 ASSESSMENT — PAIN DESCRIPTION - LOCATION: LOCATION: ABDOMEN

## 2022-05-15 ASSESSMENT — PAIN DESCRIPTION - ORIENTATION: ORIENTATION: MID

## 2022-05-15 ASSESSMENT — ENCOUNTER SYMPTOMS
ABDOMINAL PAIN: 1
COUGH: 0
VOMITING: 0
SORE THROAT: 1
NAUSEA: 1
EYE PAIN: 0
CHEST TIGHTNESS: 0
TROUBLE SWALLOWING: 1

## 2022-05-15 ASSESSMENT — PAIN - FUNCTIONAL ASSESSMENT: PAIN_FUNCTIONAL_ASSESSMENT: PREVENTS OR INTERFERES SOME ACTIVE ACTIVITIES AND ADLS

## 2022-05-15 ASSESSMENT — PAIN SCALES - GENERAL
PAINLEVEL_OUTOF10: 4
PAINLEVEL_OUTOF10: 3
PAINLEVEL_OUTOF10: 0
PAINLEVEL_OUTOF10: 0
PAINLEVEL_OUTOF10: 3

## 2022-05-15 ASSESSMENT — PAIN DESCRIPTION - DESCRIPTORS: DESCRIPTORS: ACHING;SORE

## 2022-05-15 ASSESSMENT — PAIN DESCRIPTION - PAIN TYPE: TYPE: SURGICAL PAIN

## 2022-05-15 ASSESSMENT — PAIN DESCRIPTION - ONSET: ONSET: ON-GOING

## 2022-05-15 ASSESSMENT — PAIN DESCRIPTION - FREQUENCY: FREQUENCY: CONTINUOUS

## 2022-05-15 NOTE — PLAN OF CARE
Problem: Pain  Goal: Verbalizes/displays adequate comfort level or baseline comfort level  Outcome: Progressing     Problem: Gastrointestinal - Adult  Goal: Minimal or absence of nausea and vomiting  Outcome: Progressing  Problem: Gastrointestinal - Adult  Goal: Maintains or returns to baseline bowel function  Outcome: Progressing  Maintains or returns to baseline bowel function:   Assess bowel function   Administer IV fluids as ordered to ensure adequate hydration   Administer ordered medications as needed   Encourage mobilization and activity  Minimal or absence of nausea and vomiting:   Administer IV fluids as ordered to ensure adequate hydration   Administer ordered antiemetic medications as needed

## 2022-05-15 NOTE — PROGRESS NOTES
Physical Therapy  Facility/Department: [facility] PROGRESSIVE CARE  Physical Therapy Initial Assessment    Name: Feli Barahona  : 1955  MRN: [id number]  Date of Service: 5/15/2022    Discharge Recommendations:  Continue to assess pending progress ; would expect safe d/c home. Patient Diagnosis(es): There were no encounter diagnoses. Past Medical History:  has a past medical history of Arthritis, Asthma, Diabetes mellitus ([address] Diverticulitis, GERD (gastroesophageal reflux disease), and Hyperlipidemia. Past Surgical History:  has a past surgical history that includes Tonsillectomy; Colonoscopy; hernia repair; hernia repair (2016); Sigmoid Colectomy (N/A, 2021); colostomy (2021); and Colostomy Closure (N/A, 2022). Assessment   Body Structures, Functions, Activity Limitations Requiring Skilled Therapeutic Intervention: Decreased functional mobility ; Decreased ADL status; Decreased tolerance to work activity; Decreased endurance;Decreased posture    Assessment: Pt presents pod #2 with decreased tolerance to mobility and limited gait/ OOB on POD #1. Pt with good motivation this date and good awareness of need to mobilize. Pt with some cognitive delay/ memory issues VS [place] with just her attempt to say she understands and actually doesn't. Will progress mobility as tolerated to prevent hospitalized acquired complications. Pt very tentative and fearful of coughing. Worked on splinting abd and attempting coughing. Progress as able.     Specific Instructions for Next Treatment: gait / mobility  Therapy Prognosis: Good  Decision Making: Medium Complexity  Clinical Presentation: evolving  Activity Tolerance  Activity Tolerance: Patient tolerated evaluation without incident     Plan   Plan  Plan: 1 time a day 7 days a week  Specific Instructions for Next Treatment: gait / mobility  Current Treatment Recommendations: Strengthening,ROM,Balance training,Functional mobility training,Transfer training,Endurance training,Gait training,Stair training,Home exercise program,Safety education & training,Patient/Caregiver education & training,Positioning  Safety Devices  Type of Devices: Left in chair,Nurse notified     Restrictions  Restrictions/Precautions  Restrictions/Precautions: Up as Tolerated  Position Activity Restriction  Other position/activity restrictions: drainage tube; abd binder, abd incision     Subjective   General  Chart Reviewed: Yes  Patient assessed for rehabilitation services?: Yes  Response To Previous Treatment: Not applicable  Family / Caregiver Present: No  Follows Commands: Within Functional Limits  Subjective  Subjective: Pt goal is for a safe d/c home.          Social/Functional History  Social/Functional History  Lives With: Spouse  Type of Home: House  Home Layout: One level  Bathroom Shower/Tub: Tub/Shower unit,Walk-in shower  Has the patient had two or more falls in the past year or any fall with injury in the past year?: No  ADL Assistance: Independent  Homemaking Assistance: Independent  Ambulation Assistance: Independent  Transfer Assistance: Independent  Active : Yes  Mode of Transportation: Car  Occupation: Retired  Type of Occupation: [employer]  Leisure & Hobbies: hang out with friends; have 5 nieces and nephews  Vision/Hearing  Vision Exceptions: Wears glasses for distance  Hearing: Exceptions to [facility]  Hearing Exceptions: Bilateral hearing aid    Cognition   Orientation  Overall Orientation Status: Within Functional Limits     Objective   Pulse: 93  Heart Rate Source: Monitor  BP: 135/88  BP Location: Right upper arm  Patient Position: Supine  MAP (Calculated): 103.67  Resp: 18  SpO2: 94 %  O2 Device: None (Room air)     Observation/Palpation  Observation: abd drain; abd incision, and abd binder        AROM RLE (degrees)  RLE AROM: WFL  AROM LLE (degrees)  LLE AROM : WFL  AROM RUE (degrees)  RUE AROM : WFL  AROM LUE (degrees)  LUE AROM : WFL  Strength RLE  Comment: 4/5 grossly  Strength LLE  Comment: grossly 4/5  (Lt knee -4-/5 chronic issues from10  yrs ago)           Bed mobility  Rolling to Left: Minimal assistance  Rolling to Right: Minimal assistance  Supine to Sit: Minimal assistance  Sit to Supine: Minimal assistance  Scooting: Minimal assistance  Transfers  Sit to Stand: Moderate Assistance  Stand to sit: Minimal Assistance  Bed to Chair: Minimal assistance  Comment: toilet transfer - heavy use of rails SBA  Ambulation  Surface: level tile  Device: Rolling Walker  Assistance: Contact guard assistance  Quality of Gait: apprehensive, antalgic  Gait Deviations: Slow Dora;Decreased step length;Decreased step height;Shuffles  Distance: 125 ft x1;  55 ft x 1 25 ft x 1     Balance  Posture: Good  Sitting - Static: Good  Sitting - Dynamic: Good  Standing - Static: Good  Standing - Dynamic: Good;-        Pt education post surgery -> positioning, hosp prevention, gait. AM-PAC Score 20/24     Goals  Short Term Goals  Time Frame for Short term goals: 8  Short term goal 1: Pt ind bed mob  Short term goal 2: Pt ind with transfers  Short term goal 3: Pt ind w/ gait x 300 ft w/o device  Short term goal 4: Pt ind w/ HEP  Patient Goals   Patient goals : pt goal is d/c home healthy     Therapy Time   Individual   Time In 0918   Time Out 1002   Minutes 44    treatmetn x 30 minutes.       Millie Nissen, PT

## 2022-05-15 NOTE — PROGRESS NOTES
General Surgery:  Daily Progress Note          status post colostomy reversal          PATIENT NAME: Indigo Hall     TODAY'S DATE: 5/15/2022, 7:07 AM  CC: Nominal pain    SUBJECTIVE:     Indigo Hall was seen and evaluated bedside. Patient states she has difficulty swallowing pills at baseline, will transition of able medications to liquid. Patient continues to have abdominal pain. No bowel movements or flatus overnight. Afebrile vital signs normal.  DELIO drain with 1 mL of serosanguineous fluid. Review of Systems   Constitutional: Positive for fatigue. Negative for fever. HENT: Positive for sore throat and trouble swallowing. Eyes: Negative for pain. Respiratory: Negative for cough and chest tightness. Cardiovascular: Negative for palpitations. Gastrointestinal: Positive for abdominal pain and nausea. Negative for vomiting. Genitourinary: Negative for urgency. Musculoskeletal: Negative for myalgias. Neurological: Negative for headaches. Psychiatric/Behavioral: Negative for agitation. OBJECTIVE:   VITALS:  /75   Pulse 86   Temp 97.9 °F (36.6 °C) (Oral)   Resp 16   Ht 5' 8\" (1.727 m)   Wt 190 lb 0.6 oz (86.2 kg)   SpO2 92%   BMI 28.89 kg/m²      INTAKE/OUTPUT:      Intake/Output Summary (Last 24 hours) at 5/15/2022 3599  Last data filed at 5/15/2022 0206  Gross per 24 hour   Intake 1200 ml   Output 1830 ml   Net -630 ml       PHYSICAL EXAM:  General Appearance: awake, alert, oriented, mildly distressed this morning  HEENT:  Normocephalic, atraumatic, EMOI  Heart: Heart regular rate and rhythm  Lungs: Normal respiratory effort  Abdomen: Soft, mildly distended, appropriately tender to palpation no rebound  Incision: Clean and intact with staples, iodoform packing in place. No surrounding cellulitis or signs of infection  Extremities: No cyanosis, pitting edema, rashes noted. Skin: Skin color, texture, turgor normal. No rashes or lesions.     IV Access:  PIV       Data:  CBC with Differential:    Lab Results   Component Value Date    WBC 8.0 05/15/2022    RBC 3.71 05/15/2022    HGB 10.5 05/15/2022    HCT 33.6 05/15/2022     05/15/2022    MCV 90.6 05/15/2022    MCH 28.3 05/15/2022    MCHC 31.3 05/15/2022    RDW 15.3 05/15/2022    LYMPHOPCT 10 05/15/2022    MONOPCT 10 05/15/2022    BASOPCT 0 05/15/2022    MONOSABS 0.79 05/15/2022    LYMPHSABS 0.83 05/15/2022    EOSABS 0.18 05/15/2022    BASOSABS <0.03 05/15/2022    DIFFTYPE NOT REPORTED 12/14/2021     BMP:    Lab Results   Component Value Date     05/15/2022    K 3.8 05/15/2022     05/15/2022    CO2 25 05/15/2022    BUN 6 05/15/2022    LABALBU 2.9 10/25/2021    CREATININE 0.53 05/15/2022    CALCIUM 8.3 05/15/2022    GFRAA >60 05/15/2022    LABGLOM >60 05/15/2022    GLUCOSE 105 05/15/2022       Radiology Review: No new imaging to review    ASSESSMENT:  Active Hospital Problems    Diagnosis Date Noted    History of colostomy reversal [Z98.890] 05/12/2022     Priority: Medium       73 y.o. female status post colostomy reversal on 5/12/2022. Plan:  Diet: Keep n.p.o. sips with meds  Transition medications to liquid form. Patient gets nauseous with morphine transition to fentanyl  GI prophylaxis: Pepcid IV  DVT prophylaxis:  Lovenox qd  Strict I/Os  Activity:  Continue to encourage ambulation/activity    Continue to encourage incentive spirometry   Wound care: Daily packing changes with 1/4 inch iodoform gauze cover with ABD and paper tape. DC planning: Anticipate discharge home with return of bowel function. Electronically signed by Tha Irving MD  on 5/15/2022 at 7:07 AM     General Surgery Attending Attestation Note    Patient was seen and examined. I agree with the above resident's exam, assessment and plan.      Having some motivation issues  I stressed the importance of ambulation today  No flatus yet  Ambulate, IS, await bowel function    Albaro Simon, [address], [place]  Office: [phone]  After Hours: Please call answering service

## 2022-05-15 NOTE — PROGRESS NOTES
Rocio Mcdonough is resting well at this time. She c/o slight HA with HS Tylenol; c/o abdominal discomfort that improved with dressing change and adjustment of abdominal binder. DELIO draining sanguineous drainage. No redness or s/s of infection at DELIO or incision sites. She is getting up to bathroom with walker and one assist and is tolerating activity well. IV fluids running with no difficulty; IV site patent. She has call light in reach and is using appropriately. Safety maintained.

## 2022-05-16 LAB
ANION GAP SERPL CALCULATED.3IONS-SCNC: 9 MMOL/L (ref 9–17)
BUN BLDV-MCNC: 4 MG/DL (ref 8–23)
BUN/CREAT BLD: 9 (ref 9–20)
CALCIUM SERPL-MCNC: 8.1 MG/DL (ref 8.6–10.4)
CHLORIDE BLD-SCNC: 103 MMOL/L (ref 98–107)
CO2: 25 MMOL/L (ref 20–31)
CREAT SERPL-MCNC: 0.45 MG/DL (ref 0.5–0.9)
GFR AFRICAN AMERICAN: >60 ML/MIN
GFR NON-AFRICAN AMERICAN: >60 ML/MIN
GFR SERPL CREATININE-BSD FRML MDRD: ABNORMAL ML/MIN/{1.73_M2}
GLUCOSE BLD-MCNC: 134 MG/DL (ref 65–105)
GLUCOSE BLD-MCNC: 135 MG/DL (ref 65–105)
GLUCOSE BLD-MCNC: 136 MG/DL (ref 65–105)
GLUCOSE BLD-MCNC: 140 MG/DL (ref 65–105)
GLUCOSE BLD-MCNC: 142 MG/DL (ref 65–105)
GLUCOSE BLD-MCNC: 145 MG/DL (ref 70–99)
GLUCOSE BLD-MCNC: 162 MG/DL (ref 65–105)
MAGNESIUM: 2 MG/DL (ref 1.6–2.6)
POTASSIUM SERPL-SCNC: 4.1 MMOL/L (ref 3.7–5.3)
SODIUM BLD-SCNC: 137 MMOL/L (ref 135–144)
SURGICAL PATHOLOGY REPORT: NORMAL

## 2022-05-16 PROCEDURE — 97530 THERAPEUTIC ACTIVITIES: CPT

## 2022-05-16 PROCEDURE — 83735 ASSAY OF MAGNESIUM: CPT

## 2022-05-16 PROCEDURE — 2500000003 HC RX 250 WO HCPCS: Performed by: STUDENT IN AN ORGANIZED HEALTH CARE EDUCATION/TRAINING PROGRAM

## 2022-05-16 PROCEDURE — 82947 ASSAY GLUCOSE BLOOD QUANT: CPT

## 2022-05-16 PROCEDURE — 2580000003 HC RX 258: Performed by: STUDENT IN AN ORGANIZED HEALTH CARE EDUCATION/TRAINING PROGRAM

## 2022-05-16 PROCEDURE — 6360000002 HC RX W HCPCS: Performed by: STUDENT IN AN ORGANIZED HEALTH CARE EDUCATION/TRAINING PROGRAM

## 2022-05-16 PROCEDURE — 6370000000 HC RX 637 (ALT 250 FOR IP): Performed by: STUDENT IN AN ORGANIZED HEALTH CARE EDUCATION/TRAINING PROGRAM

## 2022-05-16 PROCEDURE — 97166 OT EVAL MOD COMPLEX 45 MIN: CPT

## 2022-05-16 PROCEDURE — 1200000000 HC SEMI PRIVATE

## 2022-05-16 PROCEDURE — 97535 SELF CARE MNGMENT TRAINING: CPT

## 2022-05-16 PROCEDURE — 80048 BASIC METABOLIC PNL TOTAL CA: CPT

## 2022-05-16 PROCEDURE — 36415 COLL VENOUS BLD VENIPUNCTURE: CPT

## 2022-05-16 RX ORDER — ACETAMINOPHEN 160 MG/5ML
1000 SUSPENSION, ORAL (FINAL DOSE FORM) ORAL EVERY 8 HOURS SCHEDULED
Status: DISCONTINUED | OUTPATIENT
Start: 2022-05-16 | End: 2022-05-16 | Stop reason: CLARIF

## 2022-05-16 RX ORDER — KETOROLAC TROMETHAMINE 15 MG/ML
15 INJECTION, SOLUTION INTRAMUSCULAR; INTRAVENOUS EVERY 6 HOURS
Status: DISCONTINUED | OUTPATIENT
Start: 2022-05-16 | End: 2022-05-18 | Stop reason: HOSPADM

## 2022-05-16 RX ORDER — ACETAMINOPHEN 500 MG
1000 TABLET ORAL EVERY 8 HOURS SCHEDULED
Status: DISCONTINUED | OUTPATIENT
Start: 2022-05-16 | End: 2022-05-18 | Stop reason: HOSPADM

## 2022-05-16 RX ORDER — FAMOTIDINE 20 MG/1
20 TABLET, FILM COATED ORAL 2 TIMES DAILY
Status: DISCONTINUED | OUTPATIENT
Start: 2022-05-16 | End: 2022-05-18 | Stop reason: HOSPADM

## 2022-05-16 RX ORDER — ACETAMINOPHEN 160 MG/5ML
1000 SOLUTION ORAL EVERY 8 HOURS SCHEDULED
Status: DISCONTINUED | OUTPATIENT
Start: 2022-05-16 | End: 2022-05-16

## 2022-05-16 RX ADMIN — FAMOTIDINE 20 MG: 20 TABLET, FILM COATED ORAL at 21:13

## 2022-05-16 RX ADMIN — INSULIN LISPRO 3 UNITS: 100 INJECTION, SOLUTION INTRAVENOUS; SUBCUTANEOUS at 02:18

## 2022-05-16 RX ADMIN — ROSUVASTATIN CALCIUM 5 MG: 10 TABLET, FILM COATED ORAL at 21:13

## 2022-05-16 RX ADMIN — FAMOTIDINE 20 MG: 20 TABLET, FILM COATED ORAL at 08:07

## 2022-05-16 RX ADMIN — KETOROLAC TROMETHAMINE 15 MG: 15 INJECTION, SOLUTION INTRAMUSCULAR; INTRAVENOUS at 22:53

## 2022-05-16 RX ADMIN — ONDANSETRON 4 MG: 2 INJECTION INTRAMUSCULAR; INTRAVENOUS at 11:51

## 2022-05-16 RX ADMIN — POTASSIUM CHLORIDE, DEXTROSE MONOHYDRATE AND SODIUM CHLORIDE: 150; 5; 450 INJECTION, SOLUTION INTRAVENOUS at 21:12

## 2022-05-16 RX ADMIN — POTASSIUM CHLORIDE, DEXTROSE MONOHYDRATE AND SODIUM CHLORIDE: 150; 5; 450 INJECTION, SOLUTION INTRAVENOUS at 08:27

## 2022-05-16 RX ADMIN — INSULIN LISPRO 3 UNITS: 100 INJECTION, SOLUTION INTRAVENOUS; SUBCUTANEOUS at 06:49

## 2022-05-16 RX ADMIN — ONDANSETRON 4 MG: 4 TABLET, ORALLY DISINTEGRATING ORAL at 04:32

## 2022-05-16 RX ADMIN — ACETAMINOPHEN ORAL SOLUTION 1000 MG: 325 SOLUTION ORAL at 08:02

## 2022-05-16 RX ADMIN — ACETAMINOPHEN 1000 MG: 500 TABLET ORAL at 22:53

## 2022-05-16 RX ADMIN — ENOXAPARIN SODIUM 40 MG: 100 INJECTION SUBCUTANEOUS at 08:02

## 2022-05-16 RX ADMIN — KETOROLAC TROMETHAMINE 15 MG: 15 INJECTION, SOLUTION INTRAMUSCULAR; INTRAVENOUS at 11:52

## 2022-05-16 RX ADMIN — ACETAMINOPHEN 1000 MG: 500 TABLET ORAL at 14:05

## 2022-05-16 RX ADMIN — POTASSIUM CHLORIDE, DEXTROSE MONOHYDRATE AND SODIUM CHLORIDE: 150; 5; 450 INJECTION, SOLUTION INTRAVENOUS at 00:11

## 2022-05-16 RX ADMIN — KETOROLAC TROMETHAMINE 15 MG: 15 INJECTION, SOLUTION INTRAMUSCULAR; INTRAVENOUS at 18:59

## 2022-05-16 RX ADMIN — SODIUM CHLORIDE, PRESERVATIVE FREE 10 ML: 5 INJECTION INTRAVENOUS at 08:07

## 2022-05-16 RX ADMIN — KETOROLAC TROMETHAMINE 15 MG: 15 INJECTION, SOLUTION INTRAMUSCULAR; INTRAVENOUS at 06:40

## 2022-05-16 RX ADMIN — INSULIN LISPRO 3 UNITS: 100 INJECTION, SOLUTION INTRAVENOUS; SUBCUTANEOUS at 19:04

## 2022-05-16 ASSESSMENT — PAIN DESCRIPTION - ORIENTATION
ORIENTATION: RIGHT;LOWER

## 2022-05-16 ASSESSMENT — PAIN - FUNCTIONAL ASSESSMENT
PAIN_FUNCTIONAL_ASSESSMENT: PREVENTS OR INTERFERES SOME ACTIVE ACTIVITIES AND ADLS
PAIN_FUNCTIONAL_ASSESSMENT: ACTIVITIES ARE NOT PREVENTED
PAIN_FUNCTIONAL_ASSESSMENT: PREVENTS OR INTERFERES SOME ACTIVE ACTIVITIES AND ADLS
PAIN_FUNCTIONAL_ASSESSMENT: ACTIVITIES ARE NOT PREVENTED

## 2022-05-16 ASSESSMENT — PAIN DESCRIPTION - FREQUENCY
FREQUENCY: CONTINUOUS

## 2022-05-16 ASSESSMENT — PAIN DESCRIPTION - ONSET
ONSET: ON-GOING

## 2022-05-16 ASSESSMENT — ENCOUNTER SYMPTOMS
COUGH: 1
TROUBLE SWALLOWING: 1
VOMITING: 0
ABDOMINAL PAIN: 1
NAUSEA: 1
EYE PAIN: 0
SORE THROAT: 1
SHORTNESS OF BREATH: 0

## 2022-05-16 ASSESSMENT — PAIN DESCRIPTION - LOCATION
LOCATION: ABDOMEN

## 2022-05-16 ASSESSMENT — PAIN DESCRIPTION - PAIN TYPE
TYPE: SURGICAL PAIN

## 2022-05-16 ASSESSMENT — PAIN SCALES - GENERAL
PAINLEVEL_OUTOF10: 6
PAINLEVEL_OUTOF10: 3
PAINLEVEL_OUTOF10: 3
PAINLEVEL_OUTOF10: 4

## 2022-05-16 ASSESSMENT — PAIN DESCRIPTION - DESCRIPTORS
DESCRIPTORS: SORE
DESCRIPTORS: SORE
DESCRIPTORS: ACHING
DESCRIPTORS: SORE
DESCRIPTORS: SORE

## 2022-05-16 NOTE — PROGRESS NOTES
Pt transferred to room 2005 from [facility] room 1001. Oriented to room, call light and bed mechanics. Side rails up x2. Call light within reach. Orders reviewed.

## 2022-05-16 NOTE — PLAN OF CARE
Problem: Discharge Planning  Goal: Discharge to home or other facility with appropriate resources  5/16/2022 1519 by Rosalind Tolentino RN  Outcome: Progressing  Flowsheets (Taken 5/16/2022 0800)  Discharge to home or other facility with appropriate resources: Refer to discharge planning if patient needs post-hospital services based on physician order or complex needs related to functional status, cognitive ability or social support system  5/16/2022 0233 by Lianet Callahan RN  Outcome: Progressing  Flowsheets (Taken 5/15/2022 2111)  Discharge to home or other facility with appropriate resources:   Identify barriers to discharge with patient and caregiver   Arrange for needed discharge resources and transportation as appropriate   Identify discharge learning needs (meds, wound care, etc)     Problem: Chronic Conditions and Co-morbidities  Goal: Patient's chronic conditions and co-morbidity symptoms are monitored and maintained or improved  5/16/2022 1519 by Rosalind Tolentino RN  Outcome: Progressing  Flowsheets (Taken 5/16/2022 0800)  Care Plan - Patient's Chronic Conditions and Co-Morbidity Symptoms are Monitored and Maintained or Improved: Monitor and assess patient's chronic conditions and comorbid symptoms for stability, deterioration, or improvement  5/16/2022 0233 by Lianet Callahan RN  Outcome: Progressing  Flowsheets (Taken 5/15/2022 2111)  Care Plan - Patient's Chronic Conditions and Co-Morbidity Symptoms are Monitored and Maintained or Improved: Monitor and assess patient's chronic conditions and comorbid symptoms for stability, deterioration, or improvement     Problem: Pain  Goal: Verbalizes/displays adequate comfort level or baseline comfort level  5/16/2022 1519 by Rosalind Tolentino RN  Outcome: Progressing  5/16/2022 0233 by Lianet Callahan RN  Outcome: Progressing     Problem: ABCDS Injury Assessment  Goal: Absence of physical injury  5/16/2022 1519 by Rosalind Tolentino RN  Outcome: Progressing  5/16/2022 0233 by Lianet Hernández RN  Outcome: Progressing  Flowsheets (Taken 5/16/2022 0232)  Absence of Physical Injury: Implement safety measures based on patient assessment     Problem: Safety - Adult  Goal: Free from fall injury  5/16/2022 1519 by Nithya Benson RN  Outcome: Progressing  5/16/2022 0233 by Lianet Hernández RN  Outcome: Progressing  Flowsheets (Taken 5/16/2022 0232)  Free From Fall Injury: Instruct family/caregiver on patient safety     Problem: Neurosensory - Adult  Goal: Achieves maximal functionality and self care  5/16/2022 1519 by Nithya Benson RN  Outcome: Progressing  Flowsheets (Taken 5/16/2022 0800)  Achieves maximal functionality and self care: Encourage and assist patient to increase activity and self care with guidance from physical therapy/occupational therapy  5/16/2022 0233 by Lianet Hernández RN  Outcome: Progressing  Flowsheets (Taken 5/15/2022 2111)  Achieves maximal functionality and self care:   Monitor swallowing and airway patency with patient fatigue and changes in neurological status   Encourage and assist patient to increase activity and self care with guidance from physical therapy/occupational therapy     Problem: Cardiovascular - Adult  Goal: Absence of cardiac dysrhythmias or at baseline  5/16/2022 1519 by Nithya Benson RN  Outcome: Progressing  5/16/2022 0233 by Lianet Hernández RN  Outcome: Progressing  Flowsheets (Taken 5/15/2022 2111)  Absence of cardiac dysrhythmias or at baseline: Monitor cardiac rate and rhythm     Problem: Skin/Tissue Integrity - Adult  Goal: Incisions, wounds, or drain sites healing without S/S of infection  5/16/2022 1519 by Nithya Benson RN  Outcome: Progressing  5/16/2022 0233 by Lianet Hernánedz RN  Outcome: Progressing  Flowsheets (Taken 5/16/2022 0232)  Incisions, Wounds, or Drain Sites Healing Without Sign and Symptoms of Infection:   TWICE DAILY: Assess and document skin integrity   TWICE DAILY: Assess and document dressing/incision, wound bed, drain sites and surrounding tissue   Implement wound care per orders   ADMISSION and DAILY: Assess and document risk factors for pressure ulcer development     Problem: Musculoskeletal - Adult  Goal: Return mobility to safest level of function  5/16/2022 1519 by Smith Leiva RN  Outcome: Progressing  5/16/2022 0233 by Colletta Sheffield, RN  Outcome: Progressing  Flowsheets (Taken 5/15/2022 2111)  Return Mobility to Safest Level of Function:   Assess patient stability and activity tolerance for standing, transferring and ambulating with or without assistive devices   Assist with transfers and ambulation using safe patient handling equipment as needed   Ensure adequate protection for wounds/incisions during mobilization   Instruct patient/family in ordered activity level  Goal: Return ADL status to a safe level of function  5/16/2022 1519 by Smith Leiva RN  Outcome: Progressing  5/16/2022 0233 by Colletta Sheffield, RN  Outcome: Progressing  Flowsheets (Taken 5/15/2022 2111)  Return ADL Status to a Safe Level of Function: Assist and instruct patient to increase activity and self care as tolerated     Problem: Gastrointestinal - Adult  Goal: Minimal or absence of nausea and vomiting  5/16/2022 1519 by Smith Leiva RN  Outcome: Progressing  Flowsheets (Taken 5/16/2022 0800)  Minimal or absence of nausea and vomiting: Administer IV fluids as ordered to ensure adequate hydration  5/16/2022 0233 by Colletta Sheffield, RN  Outcome: Progressing  Flowsheets (Taken 5/15/2022 2111)  Minimal or absence of nausea and vomiting:   Administer IV fluids as ordered to ensure adequate hydration   Maintain NPO status until nausea and vomiting are resolved   Administer ordered antiemetic medications as needed   Provide nonpharmacologic comfort measures as appropriate  Goal: Maintains or returns to baseline bowel function  5/16/2022 1519 by Smith Leiva RN  Outcome: Progressing  Flowsheets (Taken 5/16/2022 0800)  Maintains or returns to baseline bowel function:   Assess bowel function   Encourage oral fluids to ensure adequate hydration   Administer IV fluids as ordered to ensure adequate hydration   Administer ordered medications as needed   Encourage mobilization and activity  5/16/2022 0233 by Lianet Callahan RN  Outcome: Progressing  Flowsheets (Taken 5/15/2022 2111)  Maintains or returns to baseline bowel function:   Assess bowel function   Administer IV fluids as ordered to ensure adequate hydration   Administer ordered medications as needed   Encourage mobilization and activity  Goal: Maintains adequate nutritional intake  5/16/2022 1519 by Rosalind Tolentino RN  Outcome: Progressing  Flowsheets (Taken 5/16/2022 0800)  Maintains adequate nutritional intake:   Identify factors contributing to decreased intake, treat as appropriate   Monitor percentage of each meal consumed  5/16/2022 0233 by Lianet Callahan RN  Outcome: Progressing     Problem: Genitourinary - Adult  Goal: Absence of urinary retention  5/16/2022 1519 by Rosalind Tolentino RN  Outcome: Progressing  Flowsheets (Taken 5/16/2022 0800)  Absence of urinary retention:   Assess patients ability to void and empty bladder   Monitor intake/output and perform bladder scan as needed  5/16/2022 0233 by Lianet Callahan RN  Outcome: Progressing  Flowsheets (Taken 5/15/2022 2111)  Absence of urinary retention:   Assess patients ability to void and empty bladder   Monitor intake/output and perform bladder scan as needed  Goal: Urinary catheter remains patent  5/16/2022 1519 by Rosalind Tolentino RN  Outcome: Progressing  5/16/2022 0233 by Lianet Callahan RN  Outcome: Progressing     Problem: Infection - Adult  Goal: Absence of infection at discharge  5/16/2022 1519 by Rosalind Tolentino RN  Outcome: Progressing  Flowsheets (Taken 5/16/2022 0800)  Absence of infection at discharge:   Assess and monitor for signs and symptoms of infection   Monitor lab/diagnostic results   Administer medications as ordered   Instruct and encourage patient and family to use good hand hygiene technique  5/16/2022 0233 by Petra Mensah RN  Outcome: Progressing  Flowsheets (Taken 5/15/2022 2111)  Absence of infection at discharge:   Assess and monitor for signs and symptoms of infection   Monitor lab/diagnostic results   Monitor all insertion sites i.e., indwelling lines, tubes and drains   Instruct and encourage patient and family to use good hand hygiene technique  Goal: Absence of infection during hospitalization  5/16/2022 1519 by Crow Helton RN  Outcome: Progressing  Flowsheets (Taken 5/16/2022 0800)  Absence of infection during hospitalization:   Assess and monitor for signs and symptoms of infection   Monitor lab/diagnostic results   Administer medications as ordered   Instruct and encourage patient and family to use good hand hygiene technique  5/16/2022 0233 by Petra Mensah RN  Outcome: Progressing  Flowsheets (Taken 5/15/2022 2111)  Absence of infection during hospitalization:   Assess and monitor for signs and symptoms of infection   Monitor lab/diagnostic results   Monitor all insertion sites i.e., indwelling lines, tubes and drains   Instruct and encourage patient and family to use good hand hygiene technique     Problem: Metabolic/Fluid and Electrolytes - Adult  Goal: Glucose maintained within prescribed range  5/16/2022 1519 by Crow Helton RN  Outcome: Progressing  Flowsheets (Taken 5/16/2022 0800)  Glucose maintained within prescribed range:   Monitor blood glucose as ordered   Assess for signs and symptoms of hyperglycemia and hypoglycemia   Administer ordered medications to maintain glucose within target range  5/16/2022 0233 by Petra Mensah RN  Outcome: Progressing  Flowsheets (Taken 5/15/2022 2111)  Glucose maintained within prescribed range:   Monitor blood glucose as ordered   Assess for signs and symptoms of hyperglycemia and hypoglycemia   Administer ordered medications to maintain glucose within target range     Problem: Hematologic - Adult  Goal: Maintains hematologic stability  5/16/2022 1519 by Silvia Dowell RN  Outcome: Progressing  Flowsheets (Taken 5/16/2022 0800)  Maintains hematologic stability: Assess for signs and symptoms of bleeding or hemorrhage  5/16/2022 0233 by Armando Burnham RN  Outcome: Progressing  Flowsheets (Taken 5/15/2022 2111)  Maintains hematologic stability:   Assess for signs and symptoms of bleeding or hemorrhage   Monitor labs for bleeding or clotting disorders     Problem: Nutrition Deficit:  Goal: Optimize nutritional status  5/16/2022 1519 by Silvia Dowell RN  Outcome: Progressing  5/16/2022 0233 by Armando Burnham RN  Outcome: Progressing

## 2022-05-16 NOTE — PROGRESS NOTES
DATE: 2022    NAME: Joselito Zamora  MRN: [id number]   : 1955    Patient not seen this date for Physical Therapy due to:      [] Cancel by RN or physician due to:    [] Hemodialysis    [] Critical Lab Value Level     [] Blood transfusion in progress    [] Acute or unstable cardiovascular status   _MAP < 55 or more than >115  _HR < 40 or > 130    [] Acute or unstable pulmonary status   -FiO2 > 60%   _RR < 5 or >40    _O2 sats < 85%    [] Strict Bedrest    [] Off Unit for surgery or procedure    [] Off Unit for testing       [] Pending imaging to R/O fracture    [x] Refusal by Patient Pt  feeling nausea, overall not feeling well     [] Other      [] PT being discontinued at this time. Patient independent. No further needs. [] PT being discontinued at this time as the patient has been transferred to hospice care. No further needs.       ERIC URIBE, PTA

## 2022-05-16 NOTE — PROGRESS NOTES
General Surgery:  Daily Progress Note          status post colostomy reversal          PATIENT NAME: John Marquez     TODAY'S DATE: 5/16/2022, 5:14 AM  CC: Nominal pain    SUBJECTIVE:     Patient was seen and examined at bedside. Afebrile vital signs within normal limits. tearful this morning on examination. Thought surgery would have quick recovery and be simple. Explained to patient that she had a major surgery and that bowel function would take time to progress. Patient states she has small amount of mucus per rectum yesterday. Complains of mild nausea when she took the Tylenol solution. DELIO drain with 45 mL of serosanguineous fluid    Review of Systems   Constitutional: Positive for fatigue. Negative for chills. HENT: Positive for sore throat and trouble swallowing. Eyes: Negative for pain. Respiratory: Positive for cough. Negative for shortness of breath. Cardiovascular: Negative for palpitations. Gastrointestinal: Positive for abdominal pain and nausea. Negative for vomiting. Genitourinary: Negative for urgency. Musculoskeletal: Negative for myalgias. Neurological: Negative for headaches. Psychiatric/Behavioral: Negative for agitation. OBJECTIVE:   VITALS:  /72   Pulse 87   Temp 98.2 °F (36.8 °C) (Oral)   Resp 17   Ht 5' 8\" (1.727 m)   Wt 190 lb 0.6 oz (86.2 kg)   SpO2 96%   BMI 28.89 kg/m²      INTAKE/OUTPUT:      Intake/Output Summary (Last 24 hours) at 5/16/2022 0514  Last data filed at 5/16/2022 0210  Gross per 24 hour   Intake 1100 ml   Output 1345 ml   Net -245 ml       PHYSICAL EXAM:  General Appearance: Awake, tearful  HEENT:  Normocephalic, atraumatic, mucous membranes moist  Heart: Heart regular rate and rhythm  Lungs: Normal respiratory effort  Abdomen: Soft, mildly distended, appropriately tender to palpation, no rebound tenderness nonperitoneal  Incision: Clean and intact with staples, iodoform packing in place.   No surrounding cellulitis or signs of infection  Extremities: No cyanosis, pitting edema, rashes noted. Skin: Skin color, texture, turgor normal. No rashes or lesions. IV Access:  PIV       Data:  CBC with Differential:    Lab Results   Component Value Date    WBC 8.0 05/15/2022    RBC 3.71 05/15/2022    HGB 10.5 05/15/2022    HCT 33.6 05/15/2022     05/15/2022    MCV 90.6 05/15/2022    MCH 28.3 05/15/2022    MCHC 31.3 05/15/2022    RDW 15.3 05/15/2022    LYMPHOPCT 10 05/15/2022    MONOPCT 10 05/15/2022    BASOPCT 0 05/15/2022    MONOSABS 0.79 05/15/2022    LYMPHSABS 0.83 05/15/2022    EOSABS 0.18 05/15/2022    BASOSABS <0.03 05/15/2022    DIFFTYPE NOT REPORTED 12/14/2021     BMP:    Lab Results   Component Value Date     05/15/2022    K 3.8 05/15/2022     05/15/2022    CO2 25 05/15/2022    BUN 6 05/15/2022    LABALBU 2.9 10/25/2021    CREATININE 0.53 05/15/2022    CALCIUM 8.3 05/15/2022    GFRAA >60 05/15/2022    LABGLOM >60 05/15/2022    GLUCOSE 105 05/15/2022       Radiology Review: No new imaging to review    ASSESSMENT:  Active Hospital Problems    Diagnosis Date Noted    History of colostomy reversal [Z98.890] 05/12/2022     Priority: Medium       73 y.o. female status post colostomy reversal on 5/12/2022. Plan:  Diet: Advance to clear liquid diet  Will add Toradol for pain control  GI prophylaxis: Pepcid IV  DVT prophylaxis:  Lovenox qd  Strict I/Os  Activity:  Continue to encourage ambulation/activity   Continue to encourage incentive spirometry   Wound care: Daily packing changes per RN with 1/4 inch iodoform gauze cover with ABD and paper tape. DC planning: Anticipate discharge home with return of bowel function. Electronically signed by Jai Vieira MD  on 5/16/2022 at 5:14 AM     General Surgery Attending Attestation Note    Patient was seen and examined. I agree with the above resident's exam, assessment and plan.      Continued slow progression  Some flatus  Tolerating clears    Madolyn Sosa, DO [facility], [address], [facility]  Office: [phone]  After Hours: Please call answering service

## 2022-05-16 NOTE — PROGRESS NOTES
Occupational Therapy  Occupational Therapy Initial Assessment  Date:  2022    Patient Name: Ramon Cueva  MRN: [id number]     :  1955       RN Kenia Leonardo reports patient is medically stable for therapy treatment this date. Chart reviewed prior to treatment and patient is agreeable for therapy. All lines intact and patient positioned comfortably at end of treatment. All patient needs addressed prior to ending therapy session. Due to recent hospitalization and medical condition, pt would benefit from additional therapy at time of discharge to ensure safety. Please refer to the AM-PAC score for current functional status. PER H&P: This is Ramon Cueva a 73 y.o. female who presents for a pre-admission testing appointment for an upcoming [address] [place] [address] Street by Ese Martinez MD scheduled on 2022 at 0730 due to [address]. Patient has a history of left lower quadrant diverticulitis with abscess which was initially treated with IV Zosyn and Ertapenem in . Patient underwent resection of sigmoid colon with colostomy on 2022 for diverticulitis with abscess. Patient had follow up with Dr. Edith Melgar on 2022 who recommended colonoscopy and then potential colostomy reversal. Patient underwent colonoscopy in 2022 at the [facility]. Patient presents today for pre-admission testing for surgery.  She denies colostomy dysfunction, constipation, bloody stool      Restrictions  Restrictions/Precautions  Restrictions/Precautions: Up as Tolerated  Position Activity Restriction  Other position/activity restrictions: DELIO drain; abd binder, abd incision/covered with dresssing, LUE IV, clear liquid diet, abd precautions, bed alarm, pt may shower  Subjective   General  Chart Reviewed: Yes  Patient assessed for rehabilitation services?: Yes  Family / Caregiver Present: No  Pain Assessment  *pt c/o 4/10 in adb incision and c/o nausea and RN in room and aware     Home Living  Social/Functional History  Lives With: Spouse (pt states her spouse is in good health/works 2nd shift however is supportive)  Type of Home: House  Home Layout: One level  Home Access: Stairs to enter with rails (front door)  Entrance Stairs - Number of Steps: 2  Entrance Stairs - Rails: Right  Bathroom Shower/Tub: Tub/Shower unit,Walk-in shower  Bathroom Equipment:  (no DME)  Home Equipment:  (no DME)  Has the patient had two or more falls in the past year or any fall with injury in the past year?: No  Receives Help From: Friend(s),Family  ADL Assistance: [address]: Independent  Homemaking Responsibilities: Yes  Ambulation Assistance: Independent  Transfer Assistance: Independent  Active : Yes  Mode of Transportation: Car  Occupation: Retired  Type of Occupation: [employer]  Leisure & Hobbies: hang out with friends; have 5 nieces and nephews  Additional Comments: Pt denies recent falls. Objective   Observation/Palpation  Posture: Fair  Observation: DELIO drain; abd binder on pt upon arival  Scar: abd incision and covered with dressing     ADL  Feeding: Independent (pt reports fair appetite at this time, clear liquid diet)  Grooming: Setup  UE Bathing: Setup;Stand by assistance  LE Bathing: Setup;Contact guard assistance  UE Dressing: Setup;Minimal assistance (abd binder already on upon entrance to room; assist with hosp gown)  LE Dressing: Setup;Minimal assistance (pt was able after edu/demo on compensatory tech/pain mgt tech nicole B socks while crossing LE's seated in chair due to abd prec and set up/SBA and increased time needed)  LE Dressing Skilled Clinical Factors: *Pt was edu on recommendation for reacher use at CO for pain mgt tech/increasing I and ease with item retrieval and LB self care as well as resources for purchase. Pt was in agreement with edu. Toileting: Minimal assistance;Setup (with gown mgt; pt attempted to have a 2nd BM however unable and just gassy.   RN states she had a previous mucousy BM with her prior to writer arrival)  Tone RUE  RUKOSTAS Tone: Normotonic  Tone LUE  LUE Tone: Normotonic  Coordination  Movements Are Fluid And Coordinated: Yes     Balance  Sitting Balance: Stand by assistance  Standing Balance: MIN-Contact guard assistance (no AD)  Standing Balance  Time: stand jules < 1 min and pt fatigues easily and with c/o nausea  Functional Mobility  Functional - Mobility Device: No device (pt declined RW use however did manage her IV pole at times)  Assist Level: MIN-Contact guard assistance  Functional Mobility Comments: MOD cues needed to slow down and pace self, pursed lip breathing, looking up and scanning room and awareness/assist with lines to increase overall safety/reduce falls. Bed mobility  Rolling to Left: Minimal assistance  Rolling to Right: Minimal assistance  Supine to Sit: Moderate assistance;Minimal assistance (completed x 2 this session)  Sit to Supine: Moderate assistance;Minimal assistance (with increased time)  Scooting: Contact guard assistance  Bed Mobility Comments: Pt was edu/demo on proper log rolling tech, pursed lip breathing, bending BLE's to assist with rolling in bed, hand placement on bed rail as well as awareness/assist with lines to increase overall safety. Transfers  Stand Step Transfers: Contact guard assistance;Minimal assistance (no AD)  Sit to stand: Contact guard assistance;Minimal assistance; with increased time due to low surface in rocker recliner pt was sitting in upon arrival to room   Stand to sit: Contact guard assistance;Minimal assistance  Transfer Comments: MOD-MIN cues needed for hand placement reaching back to surface, pacing, scanning, pursed lip breathing, controlled stand to sit and awareness/assist with lines to increase overall safety. Cognition  Overall Cognitive Status: Exceptions  Arousal/Alertness: Appropriate responses to stimuli  Following Commands:  Follows all commands without difficulty  Attention Span: Appears intact  Memory: Appears intact  Safety Judgement: Decreased awareness of need for safety  Problem Solving: Assistance required to correct errors made;Assistance required to identify errors made;Decreased awareness of errors  Insights: Decreased awareness of deficits  Initiation: Does not require cues  Sequencing: Does not require cues  Perception  Overall Perceptual Status: WFL  Sensation  Overall Sensation Status: WFL           LUE AROM (degrees)  LUE AROM : WFL  RUE AROM (degrees)  RUE AROM : WFL  LUE Strength  Gross LUE Strength: WFL  L Hand General: 4+/5  RUE Strength  Gross RUE Strength: WFL  R Hand General: 4+/5                           AM-PAC: 19             Assessment   Assessment  Performance deficits / Impairments: Decreased functional mobility ; Decreased safe awareness;Decreased balance;Decreased ADL status; Decreased endurance;Decreased high-level IADLs;Decreased posture  Assessment: Skilled OT services are indicated for teach and train of AE use to increase overall I/ease and for pain mgt tech with functional tasks as well as improving act jules/balance to reduce fall risk to return home with assist as needed.   Prognosis: Good  Decision Making: Medium Complexity  Discharge Recommendations: Patient would benefit from continued therapy after discharge  Activity Tolerance  Activity Tolerance: Patient limited by fatigue;Patient limited by pain (limited by c/o nausea)  Activity Tolerance Comments: fair minus       Plan   Plan  Times per Week: 5x/week 1-2x/day as jules  Current Treatment Recommendations: Strengthening,Balance training,Functional mobility training,Endurance training,Neuromuscular re-education,Cognitive reorientation,Pain management,Safety education & training,Patient/Caregiver education & training,Positioning,Self-Care / ADL,Home management training                   *Pt edu:  OT role/POC, safety in function, call light use/fall prevention, proper bed mob/proper log rollig tech, pain mgt tech for healing, pursed lip breathing, benefits of AE use, compensatory tech with self care, recommendations for continued therapy                                    Goals  Short Term Goals  Time Frame for Short term goals: by discharge, pt to demo  Short Term Goal 1: bed mob tasks with use of rail/prooer log rolling tech as needed to MOD I-I. Short Term Goal 2: I with abd precautions to reduce pain issue as well as BUE HEP with use of handouts to maintain strength. Short Term Goal 3: ADL transfers and functional mob to I level. Short Term Goal 4: totla body ADL tasks with use of AE/grab bar as needed to MOD I-I. Short Term Goal 5: standing to SUP and jules > 8 mins as able to reduce falls in function. Long Term Goals  Long Term Goal 1: Pt/caregivers to be I with pressure relief, EC/WS and fall prevention tech as well as DME/AE recommendations with use of handouts. Patient Goals   Patient goals : Pt states to just get home!        Therapy Time   Individual Concurrent Group Co-treatment   Time In 1050 (plus 10 min chart review/nursing communication)         Time Out 1129         Minutes 39=49 mins total          Timed Code Treatment Minutes: [address] [facility], OT

## 2022-05-16 NOTE — PROGRESS NOTES
Nutrition Assessment     Type and Reason for Visit: Reassess    Nutrition Recommendations/Plan:   1. Continue Clear liquid diet  2. Start Ensure Clear 2x/day (Mixed Berry)  3. Monitor p.o intakes, diet tolerance, diet advancement, GI function and labs     Malnutrition Assessment:  Malnutrition Status: Severe malnutrition    Nutrition Assessment:  Patient is status post colostomy resersal on 5/12/22. Diet has advanced to clear liquids which is tolerated. Patient reports nausea is related to medications. Patient is passing flatus and bowel movements. Patient ageed to try Ensure Clear 2x/day. Monitor p.o intakes, diet tolerance, diet advancement, GI function and labs    Estimated Daily Nutrient Needs:  Energy (kcal):  2025 kcal (MSJ 1.1 activity factor, 1.2 stress factor) Weight Used for Energy Requirements: Current     Protein (g):  83-95 gm protein (1.3-1.5 g/kg) Weight Used for Protein Requirements: Ideal          Nutrition Related Findings:   No edema. Active bowel sounds. S/P colostomy reversal and partial colectomy (5/12/22) Wound Type: Surgical Incision    Current Nutrition Therapies:    ADULT DIET;  Clear Liquid  ADULT ORAL NUTRITION SUPPLEMENT; Breakfast, Dinner; Clear Liquid Oral Supplement    Anthropometric Measures:  · Height: 5' 8\" (172.7 cm)  · Current Body Wt: 212 lb (96.2 kg)   · BMI: 32.2    Nutrition Diagnosis:   · Severe malnutrition related to altered GI function (diverticulitis with absess; s/p colostomy reversal) as evidenced by poor intake prior to admission,intake 0-25%,weight loss greater than or equal to 20% in 1 year      Nutrition Interventions:   Food and/or Nutrient Delivery: Continue Current Diet,Start Oral Nutrition Supplement  Nutrition Education/Counseling: Education not indicated  Coordination of Nutrition Care: Continue to monitor while inpatient       Goals:     Goals: PO intake 75% or greater       Nutrition Monitoring and Evaluation:   Behavioral-Environmental Outcomes: None Identified  Food/Nutrient Intake Outcomes: Food and Nutrient Intake,Diet Advancement/Tolerance,Supplement Intake  Physical Signs/Symptoms Outcomes: Biochemical Data,Fluid Status or Edema,Nausea or Vomiting,Weight,Skin,GI Status    Discharge Planning:    Continue current diet,Continue Oral Nutrition Supplement         Shon YOUNG, RDN, LDN  Lead Clinical Dietitian  RD Office Phone [phone]

## 2022-05-16 NOTE — PLAN OF CARE
Problem: Discharge Planning  Goal: Discharge to home or other facility with appropriate resources  Outcome: Progressing     Problem: Chronic Conditions and Co-morbidities  Goal: Patient's chronic conditions and co-morbidity symptoms are monitored and maintained or improved  Outcome: Progressing     Problem: Pain  Goal: Verbalizes/displays adequate comfort level or baseline comfort level  Outcome: Progressing     Problem: ABCDS Injury Assessment  Goal: Absence of physical injury  Outcome: Progressing  Flowsheets (Taken 5/16/2022 0232)  Absence of Physical Injury: Implement safety measures based on patient assessment     Problem: Safety - Adult  Goal: Free from fall injury  5/16/2022 0233 by Paulette Wilson RN  Outcome: Progressing  Flowsheets (Taken 5/16/2022 0232)  Free From Fall Injury: Instruct family/caregiver on patient safety     Problem: Neurosensory - Adult  Goal: Achieves maximal functionality and self care  Outcome: Progressing     Problem: Cardiovascular - Adult  Goal: Absence of cardiac dysrhythmias or at baseline  Outcome: Progressing     Problem: Skin/Tissue Integrity - Adult  Goal: Incisions, wounds, or drain sites healing without S/S of infection  Outcome: Progressing  Flowsheets (Taken 5/16/2022 0232)  Incisions, Wounds, or Drain Sites Healing Without Sign and Symptoms of Infection:   TWICE DAILY: Assess and document skin integrity   TWICE DAILY: Assess and document dressing/incision, wound bed, drain sites and surrounding tissue   Implement wound care per orders   ADMISSION and DAILY: Assess and document risk factors for pressure ulcer development     Problem: Musculoskeletal - Adult  Goal: Return mobility to safest level of function  Outcome: Progressing  Goal: Return ADL status to a safe level of function  Outcome: Progressing     Problem: Gastrointestinal - Adult  Goal: Minimal or absence of nausea and vomiting  Outcome: Progressing  Goal: Maintains or returns to baseline bowel function  Outcome: Progressing  Goal: Maintains adequate nutritional intake  Outcome: Progressing     Problem: Genitourinary - Adult  Goal: Absence of urinary retention  Outcome: Progressing  Goal: Urinary catheter remains patent  Outcome: Progressing     Problem: Infection - Adult  Goal: Absence of infection at discharge  Outcome: Progressing  Goal: Absence of infection during hospitalization  Outcome: Progressing     Problem: Metabolic/Fluid and Electrolytes - Adult  Goal: Glucose maintained within prescribed range  Outcome: Progressing     Problem: Hematologic - Adult  Goal: Maintains hematologic stability  Outcome: Progressing     Problem: Nutrition Deficit:  Goal: Optimize nutritional status  Outcome: Progressing

## 2022-05-17 LAB
ANION GAP SERPL CALCULATED.3IONS-SCNC: 9 MMOL/L (ref 9–17)
BUN BLDV-MCNC: 5 MG/DL (ref 8–23)
BUN/CREAT BLD: 10 (ref 9–20)
CALCIUM SERPL-MCNC: 8.4 MG/DL (ref 8.6–10.4)
CHLORIDE BLD-SCNC: 103 MMOL/L (ref 98–107)
CO2: 26 MMOL/L (ref 20–31)
CREAT SERPL-MCNC: 0.51 MG/DL (ref 0.5–0.9)
GFR AFRICAN AMERICAN: >60 ML/MIN
GFR NON-AFRICAN AMERICAN: >60 ML/MIN
GFR SERPL CREATININE-BSD FRML MDRD: ABNORMAL ML/MIN/{1.73_M2}
GLUCOSE BLD-MCNC: 105 MG/DL (ref 65–105)
GLUCOSE BLD-MCNC: 118 MG/DL (ref 65–105)
GLUCOSE BLD-MCNC: 128 MG/DL (ref 65–105)
GLUCOSE BLD-MCNC: 130 MG/DL (ref 65–105)
GLUCOSE BLD-MCNC: 132 MG/DL (ref 65–105)
GLUCOSE BLD-MCNC: 149 MG/DL (ref 70–99)
MAGNESIUM: 1.9 MG/DL (ref 1.6–2.6)
POTASSIUM SERPL-SCNC: 4.4 MMOL/L (ref 3.7–5.3)
SODIUM BLD-SCNC: 138 MMOL/L (ref 135–144)

## 2022-05-17 PROCEDURE — 97110 THERAPEUTIC EXERCISES: CPT | Performed by: NURSE PRACTITIONER

## 2022-05-17 PROCEDURE — 80048 BASIC METABOLIC PNL TOTAL CA: CPT

## 2022-05-17 PROCEDURE — 97535 SELF CARE MNGMENT TRAINING: CPT | Performed by: NURSE PRACTITIONER

## 2022-05-17 PROCEDURE — 2580000003 HC RX 258: Performed by: STUDENT IN AN ORGANIZED HEALTH CARE EDUCATION/TRAINING PROGRAM

## 2022-05-17 PROCEDURE — 6360000002 HC RX W HCPCS: Performed by: STUDENT IN AN ORGANIZED HEALTH CARE EDUCATION/TRAINING PROGRAM

## 2022-05-17 PROCEDURE — 97530 THERAPEUTIC ACTIVITIES: CPT | Performed by: NURSE PRACTITIONER

## 2022-05-17 PROCEDURE — 6370000000 HC RX 637 (ALT 250 FOR IP): Performed by: STUDENT IN AN ORGANIZED HEALTH CARE EDUCATION/TRAINING PROGRAM

## 2022-05-17 PROCEDURE — 1200000000 HC SEMI PRIVATE

## 2022-05-17 PROCEDURE — 82947 ASSAY GLUCOSE BLOOD QUANT: CPT

## 2022-05-17 PROCEDURE — 97116 GAIT TRAINING THERAPY: CPT

## 2022-05-17 PROCEDURE — 83735 ASSAY OF MAGNESIUM: CPT

## 2022-05-17 PROCEDURE — 36415 COLL VENOUS BLD VENIPUNCTURE: CPT

## 2022-05-17 RX ORDER — CALCIUM CARBONATE 200(500)MG
500 TABLET,CHEWABLE ORAL 3 TIMES DAILY PRN
Status: DISCONTINUED | OUTPATIENT
Start: 2022-05-17 | End: 2022-05-18 | Stop reason: HOSPADM

## 2022-05-17 RX ORDER — INSULIN LISPRO 100 [IU]/ML
0-18 INJECTION, SOLUTION INTRAVENOUS; SUBCUTANEOUS EVERY 4 HOURS
Status: DISCONTINUED | OUTPATIENT
Start: 2022-05-17 | End: 2022-05-17

## 2022-05-17 RX ORDER — INSULIN LISPRO 100 [IU]/ML
0-9 INJECTION, SOLUTION INTRAVENOUS; SUBCUTANEOUS NIGHTLY
Status: DISCONTINUED | OUTPATIENT
Start: 2022-05-17 | End: 2022-05-17

## 2022-05-17 RX ORDER — INSULIN LISPRO 100 [IU]/ML
0-18 INJECTION, SOLUTION INTRAVENOUS; SUBCUTANEOUS
Status: DISCONTINUED | OUTPATIENT
Start: 2022-05-17 | End: 2022-05-18 | Stop reason: HOSPADM

## 2022-05-17 RX ORDER — INSULIN LISPRO 100 [IU]/ML
0-18 INJECTION, SOLUTION INTRAVENOUS; SUBCUTANEOUS
Status: DISCONTINUED | OUTPATIENT
Start: 2022-05-17 | End: 2022-05-17

## 2022-05-17 RX ADMIN — ACETAMINOPHEN 1000 MG: 500 TABLET ORAL at 05:39

## 2022-05-17 RX ADMIN — Medication 500 MG: at 18:11

## 2022-05-17 RX ADMIN — SODIUM CHLORIDE, PRESERVATIVE FREE 10 ML: 5 INJECTION INTRAVENOUS at 19:57

## 2022-05-17 RX ADMIN — KETOROLAC TROMETHAMINE 15 MG: 15 INJECTION, SOLUTION INTRAMUSCULAR; INTRAVENOUS at 16:57

## 2022-05-17 RX ADMIN — FAMOTIDINE 20 MG: 20 TABLET, FILM COATED ORAL at 19:57

## 2022-05-17 RX ADMIN — KETOROLAC TROMETHAMINE 15 MG: 15 INJECTION, SOLUTION INTRAMUSCULAR; INTRAVENOUS at 22:55

## 2022-05-17 RX ADMIN — KETOROLAC TROMETHAMINE 15 MG: 15 INJECTION, SOLUTION INTRAMUSCULAR; INTRAVENOUS at 05:42

## 2022-05-17 RX ADMIN — SODIUM CHLORIDE, PRESERVATIVE FREE 10 ML: 5 INJECTION INTRAVENOUS at 12:17

## 2022-05-17 RX ADMIN — SODIUM CHLORIDE, PRESERVATIVE FREE 10 ML: 5 INJECTION INTRAVENOUS at 22:55

## 2022-05-17 RX ADMIN — ROSUVASTATIN CALCIUM 5 MG: 10 TABLET, FILM COATED ORAL at 19:57

## 2022-05-17 RX ADMIN — SODIUM CHLORIDE, PRESERVATIVE FREE 10 ML: 5 INJECTION INTRAVENOUS at 09:21

## 2022-05-17 RX ADMIN — ENOXAPARIN SODIUM 40 MG: 100 INJECTION SUBCUTANEOUS at 09:16

## 2022-05-17 RX ADMIN — FAMOTIDINE 20 MG: 20 TABLET, FILM COATED ORAL at 09:16

## 2022-05-17 RX ADMIN — KETOROLAC TROMETHAMINE 15 MG: 15 INJECTION, SOLUTION INTRAMUSCULAR; INTRAVENOUS at 12:17

## 2022-05-17 ASSESSMENT — ENCOUNTER SYMPTOMS
ABDOMINAL PAIN: 1
CHEST TIGHTNESS: 0
SHORTNESS OF BREATH: 0
VOMITING: 0
SINUS PAIN: 0
EYE PAIN: 0
NAUSEA: 1

## 2022-05-17 ASSESSMENT — PAIN DESCRIPTION - PAIN TYPE
TYPE: SURGICAL PAIN

## 2022-05-17 ASSESSMENT — PAIN SCALES - GENERAL
PAINLEVEL_OUTOF10: 0
PAINLEVEL_OUTOF10: 2
PAINLEVEL_OUTOF10: 3
PAINLEVEL_OUTOF10: 5
PAINLEVEL_OUTOF10: 0
PAINLEVEL_OUTOF10: 4

## 2022-05-17 ASSESSMENT — PAIN DESCRIPTION - FREQUENCY
FREQUENCY: CONTINUOUS

## 2022-05-17 ASSESSMENT — PAIN DESCRIPTION - ONSET
ONSET: ON-GOING

## 2022-05-17 ASSESSMENT — PAIN DESCRIPTION - ORIENTATION
ORIENTATION: RIGHT;LOWER
ORIENTATION: LOWER;RIGHT
ORIENTATION: RIGHT;LOWER

## 2022-05-17 ASSESSMENT — PAIN DESCRIPTION - DESCRIPTORS
DESCRIPTORS: SORE

## 2022-05-17 ASSESSMENT — PAIN DESCRIPTION - LOCATION
LOCATION: ABDOMEN

## 2022-05-17 ASSESSMENT — PAIN - FUNCTIONAL ASSESSMENT
PAIN_FUNCTIONAL_ASSESSMENT: PREVENTS OR INTERFERES SOME ACTIVE ACTIVITIES AND ADLS
PAIN_FUNCTIONAL_ASSESSMENT: ACTIVITIES ARE NOT PREVENTED
PAIN_FUNCTIONAL_ASSESSMENT: PREVENTS OR INTERFERES SOME ACTIVE ACTIVITIES AND ADLS

## 2022-05-17 NOTE — PROGRESS NOTES
Occupational Therapy  Facility/Department: [facility] MED SURG  Daily Treatment Note  NAME: Arely Lakhani  : 1955  MRN: [id number]    Date of Service: 2022    Discharge Recommendations:  Patient would benefit from continued therapy after discharge   Due to recent hospitalization and medical condition, pt would benefit from additional therapy at time of discharge to ensure safety. Please refer to the AM-PAC score for current functional status. OT Equipment Recommendations  Equipment Needed: Yes  Mobility Devices: ADL Assistive Devices  ADL Assistive Devices: Toileting - 3-in-1 Commode;Grab Bars - shower; Reacher;Long-handled Shoe Horn;Long-handled Sponge;Emergency Alert System      Patient Diagnosis(es): There were no encounter diagnoses. Assessment    Assessment: Pt progressing well towards goals. Pt would benefit from continued skilled OT services to address deficits in areas of functional balance, functional reach, ADL completion, safety awareness, transfers, UE strength and functional mobility, all limiting safe return home to [facility]. Discharge Recommendations: Patient would benefit from continued therapy after discharge  Equipment Needed: Yes  Mobility Devices: ADL [address]  Times per Week: 5x/week 1-2x/day as jules  Current Treatment Recommendations: Strengthening;Balance training;Functional mobility training; Endurance training;Neuromuscular re-education;Cognitive reorientation;Pain management; Safety education & training;Patient/Caregiver education & training;Positioning;Self-Care / ADL; Home management training     Restrictions  Restrictions/Precautions  Restrictions/Precautions: Up as Tolerated  Position Activity Restriction  Other position/activity restrictions: DELIO drain; abd binder, abd incision/covered with dresssing, LUE IV, clear liquid diet, abd precautions, bed alarm, pt may shower    Subjective   Subjective  Subjective: \"I feel so much better. \"  Cognition  Overall Cognitive Status: Exceptions  Safety Judgement: Decreased awareness of need for safety  Insights: Decreased awareness of deficits        Objective    Vitals  Vitals  O2 Device: None (Room air)  Bed Mobility Training  Bed Mobility Training: Yes  Overall Level of Assistance: Minimum assistance (Supine to sit with use of bed rail and VC for log rolling tech with good carryover)  Interventions: Verbal cues; Safety awareness training  Transfer Training  Transfer Training: Yes  Overall Level of Assistance: Minimum assistance (From bed to HHA with VC for safety with good carryover)  Interventions: Verbal cues  Sit to Stand: Minimum assistance  Stand to Sit: Minimum assistance  Car Transfer: Minimum assistance  Gait  Overall Level of Assistance: Minimum assistance (HHA with VC for safety and scanning with good carryover)     ADL  Grooming: Stand by assistance;Setup  Grooming Skilled Clinical Factors: Standing at sink to complete, hair and oral care. No LOB noted  UE Bathing: Stand by assistance;Maximum assistance  UE Bathing Skilled Clinical Factors: Assist with washing incision, pt completing remaining UBB  LE Bathing: Stand by assistance;Setup  UE Dressing: Stand by assistance;Setup  LE Dressing: Minimal assistance  LE Dressing Skilled Clinical Factors: Assist with socks, pt able to complete donning pants and underwear while seatined on shower seat and standing without device, no LOB, VC for safety. Toileting: Stand by assistance  Additional Comments: Pt completing bathing and dressing while seaterd on shower bench. No SOB or LOB noted. Good pacing tech. OT Exercises  Exercise Treatment: Edu on gentle TA contraction and upright posture as well as IS use. Good understanding verbalized     Safety Devices  Type of Devices: Bed alarm in place;Call light within reach; Heels elevated for pressure relief;Patient at risk for falls; Left in bed     Patient Education  Education Given To: Patient (Brother present)  Education Provided: Role of Therapy;Plan of Care;Precautions; Home Exercise Program;Family Education; ADL Adaptive Strategies;Transfer Training;Energy Conservation; Fall Prevention Strategies  Education Method: Verbal  Barriers to Learning: None  Education Outcome: Verbalized understanding;Demonstrated understanding;Continued education needed    Goals  Short Term Goals  Time Frame for Short term goals: by discharge, pt to demo  Short Term Goal 1: bed mob tasks with use of rail/prooer log rolling tech as needed to MOD I-I. Short Term Goal 2: I with abd precautions to reduce pain issue as well as BUE HEP with use of handouts to maintain strength. Short Term Goal 3: ADL transfers and functional mob to I level. Short Term Goal 4: totla body ADL tasks with use of AE/grab bar as needed to MOD I-I. Short Term Goal 5: standing to SUP and jules > 8 mins as able to reduce falls in function. Long Term Goals  Long Term Goal 1: Pt/caregivers to be I with pressure relief, EC/WS and fall prevention tech as well as DME/AE recommendations with use of handouts. Patient Goals   Patient goals : Pt states to just get home! RN reports patient is medically stable for therapy treatment this date. Chart reviewed prior to treatment and patient is agreeable for therapy. All lines intact and patient positioned comfortably at end of treatment. All patient needs addressed prior to ending therapy session.       Therapy Time   Individual Concurrent Group Co-treatment   Time In 3079         Time Out 1119 (Additional time creating personalized HEP and edu)         Minutes [address], [place]

## 2022-05-17 NOTE — PROGRESS NOTES
General Surgery:  Daily Progress Note          status post colostomy reversal          PATIENT NAME: Quentin Alarcon     TODAY'S DATE: 5/17/2022, 5:54 AM  CC: Nominal pain    SUBJECTIVE:     Patient seen and evaluated at bedside. Afebrile, vital signs within normal limits. Patient doing much better this morning. Passing flatus had 3 bowel movements yesterday. Tolerated clear liquid diet. Complains of abdominal pain and mild nausea. DELIO drain with 135 mL of serosanguineous output/ 24 hr.     Review of Systems   Constitutional: Positive for fatigue. Negative for fever. HENT: Negative for sinus pain. Eyes: Negative for pain. Respiratory: Negative for chest tightness and shortness of breath. Cardiovascular: Negative for palpitations. Gastrointestinal: Positive for abdominal pain and nausea. Negative for vomiting. Genitourinary: Negative for difficulty urinating. Musculoskeletal: Negative for myalgias. Neurological: Negative for headaches. Psychiatric/Behavioral: Negative for agitation. OBJECTIVE:   VITALS:  BP (!) 146/80   Pulse 83   Temp 97.5 °F (36.4 °C) (Oral)   Resp 16   Ht 5' 8\" (1.727 m)   Wt 207 lb (93.9 kg)   SpO2 96%   BMI 31.47 kg/m²      INTAKE/OUTPUT:      Intake/Output Summary (Last 24 hours) at 5/17/2022 0554  Last data filed at 5/17/2022 0547  Gross per 24 hour   Intake 1124.76 ml   Output 1035 ml   Net 89.76 ml       PHYSICAL EXAM:  General Appearance: Awake, alert and oriented, no acute distress  HEENT:  Normocephalic, atraumatic  Heart: Heart regular rate and rhythm  Lungs: Well chest wall expansion with respiration  Abdomen: Soft, mildly distended, appropriate periincisional tenderness, more severe around DELIO drain in right lower quadrant. Incision: Clean and intact with staples, iodoform packing in place. No surrounding cellulitis or signs of infection, right lower quadrant DELIO drain with serosanguineous output  Extremities: No cyanosis, pitting edema, rashes noted. Skin: Skin color, texture, turgor normal. No rashes or lesions. IV Access:  PIV       Data:  CBC with Differential:    Lab Results   Component Value Date    WBC 8.0 05/15/2022    RBC 3.71 05/15/2022    HGB 10.5 05/15/2022    HCT 33.6 05/15/2022     05/15/2022    MCV 90.6 05/15/2022    MCH 28.3 05/15/2022    MCHC 31.3 05/15/2022    RDW 15.3 05/15/2022    LYMPHOPCT 10 05/15/2022    MONOPCT 10 05/15/2022    BASOPCT 0 05/15/2022    MONOSABS 0.79 05/15/2022    LYMPHSABS 0.83 05/15/2022    EOSABS 0.18 05/15/2022    BASOSABS <0.03 05/15/2022    DIFFTYPE NOT REPORTED 12/14/2021     BMP:    Lab Results   Component Value Date     05/16/2022    K 4.1 05/16/2022     05/16/2022    CO2 25 05/16/2022    BUN 4 05/16/2022    LABALBU 2.9 10/25/2021    CREATININE 0.45 05/16/2022    CALCIUM 8.1 05/16/2022    GFRAA >60 05/16/2022    LABGLOM >60 05/16/2022    GLUCOSE 145 05/16/2022       Radiology Review: No new imaging to review    ASSESSMENT:  Active Hospital Problems    Diagnosis Date Noted    History of colostomy reversal [Z98.890] 05/12/2022     Priority: Medium       1. 73 y.o. female status post colostomy reversal on 5/12/2022. Plan:  1. Diet: Advance to full liquid diet  2. Will add Toradol for pain control  3. GI prophylaxis: P.o. Pepcid  4. DVT prophylaxis:  Lovenox qd  5. Strict I/Os  6. Activity:  Continue to encourage ambulation/activity  7. Continue to encourage incentive spirometry  8. Wound care: Daily packing changes per RN with 1/4 inch iodoform gauze cover with ABD and paper tape to previous colostomy site, no longer need to pack midline incision. 9. DC planning: Anticipate discharge home with return of bowel function and tolerating regular texture diet. Electronically signed by Ned Brown MD  on 5/17/2022 at 5:54 AM   Attending Physician Statement  I have discussed the case, including pertinent history and exam findings with the resident.  I agree with the assessment, plan and orders as documented by the resident.     Advance diet  Tentative discharge tomorrow

## 2022-05-17 NOTE — PLAN OF CARE
Problem: Discharge Planning  Goal: Discharge to home or other facility with appropriate resources  5/17/2022 0029 by Olinda Dow RN  Outcome: Progressing     Problem: Chronic Conditions and Co-morbidities  Goal: Patient's chronic conditions and co-morbidity symptoms are monitored and maintained or improved  5/17/2022 0029 by Olinda Dow RN  Outcome: Progressing  Flowsheets (Taken 5/16/2022 0800 by Nithya Benson RN)  Care Plan - Patient's Chronic Conditions and Co-Morbidity Symptoms are Monitored and Maintained or Improved: Monitor and assess patient's chronic conditions and comorbid symptoms for stability, deterioration, or improvement  Note: Pt medicated with pain medication prn. Assessed all pain characteristics including level, type, location, frequency, and onset. Non-pharmacologic interventions offered to pt as well. Pt states pain is tolerable at this time.  Will continue to monitor      Problem: Pain  Goal: Verbalizes/displays adequate comfort level or baseline comfort level  5/17/2022 0029 by Olinda Dow RN  Outcome: Progressing     Problem: ABCDS Injury Assessment  Goal: Absence of physical injury  5/17/2022 0029 by Olinda Dow RN  Outcome: Progressing  Flowsheets (Taken 5/16/2022 1000 by Nithya Benson RN)  Absence of Physical Injury: Implement safety measures based on patient assessment     Problem: Safety - Adult  Goal: Free from fall injury  5/17/2022 0029 by Olinda Dow RN  Outcome: Progressing  Flowsheets (Taken 5/16/2022 1000 by Nithya Benson RN)  Free From Fall Injury: Instruct family/caregiver on patient safety     Problem: Neurosensory - Adult  Goal: Achieves maximal functionality and self care  5/17/2022 0029 by Olinda Dow RN  Outcome: Progressing  Flowsheets (Taken 5/16/2022 0800 by Nithya Benson RN)  Achieves maximal functionality and self care: Encourage and assist patient to increase activity and self care with guidance from physical therapy/occupational therapy     Problem: Cardiovascular - Adult  Goal: Absence of cardiac dysrhythmias or at baseline  5/17/2022 0029 by Birdie Spencer RN  Outcome: Progressing  Flowsheets (Taken 5/15/2022 2111 by Darcie Friend RN)  Absence of cardiac dysrhythmias or at baseline: Monitor cardiac rate and rhythm     Problem: Skin/Tissue Integrity - Adult  Goal: Incisions, wounds, or drain sites healing without S/S of infection  5/17/2022 0029 by Birdie Spencer RN  Outcome: Progressing  Flowsheets (Taken 5/16/2022 1000 by Shiv Kurtz RN)  Incisions, Wounds, or Drain Sites Healing Without Sign and Symptoms of Infection:   TWICE DAILY: Assess and document skin integrity   Implement wound care per orders   TWICE DAILY: Assess and document dressing/incision, wound bed, drain sites and surrounding tissue     Problem: Musculoskeletal - Adult  Goal: Return mobility to safest level of function  5/17/2022 0029 by Birdie Spencer RN  Outcome: Progressing     Problem: Gastrointestinal - Adult  Goal: Minimal or absence of nausea and vomiting  5/17/2022 0029 by Birdie Spencer RN  Outcome: Progressing  Flowsheets (Taken 5/17/2022 0029)  Minimal or absence of nausea and vomiting:   Administer IV fluids as ordered to ensure adequate hydration   Administer ordered antiemetic medications as needed     Problem: Gastrointestinal - Adult  Goal: Maintains or returns to baseline bowel function  5/17/2022 0029 by Birdie Spencer RN  Outcome: Progressing  Flowsheets (Taken 5/17/2022 0029)  Maintains or returns to baseline bowel function:   Assess bowel function   Encourage oral fluids to ensure adequate hydration   Administer IV fluids as ordered to ensure adequate hydration   Administer ordered medications as needed   Encourage mobilization and activity     Problem: Gastrointestinal - Adult  Goal: Maintains adequate nutritional intake  5/17/2022 0029 by Johnnie Ta RN  Outcome: Progressing  Flowsheets (Taken 5/16/2022 0800 by Belle Reagan RN)  Maintains adequate nutritional intake:   Identify factors contributing to decreased intake, treat as appropriate   Monitor percentage of each meal consumed     Problem: Genitourinary - Adult  Goal: Urinary catheter remains patent  5/17/2022 0029 by Johnnie Ta RN  Outcome: Progressing     Problem: Infection - Adult  Goal: Absence of infection at discharge  5/17/2022 0029 by Johnnie Ta RN  Outcome: Progressing  Flowsheets (Taken 5/17/2022 0029)  Absence of infection at discharge:   Assess and monitor for signs and symptoms of infection   Monitor lab/diagnostic results   Monitor all insertion sites i.e., indwelling lines, tubes and drains   Administer medications as ordered     Problem: Hematologic - Adult  Goal: Maintains hematologic stability  5/17/2022 0029 by Johnnie Ta RN  Outcome: Progressing  Flowsheets (Taken 5/17/2022 0029)  Maintains hematologic stability:   Assess for signs and symptoms of bleeding or hemorrhage   Monitor labs for bleeding or clotting disorders     Problem: Nutrition Deficit:  Goal: Optimize nutritional status  5/17/2022 0029 by Johnnie Ta RN  Outcome: Progressing

## 2022-05-17 NOTE — PROGRESS NOTES
Physical Therapy  Facility/Department: [facility]  Rehabilitation Physical Therapy Treatment Note    NAME: Peyman Heck  : 1955 (73 y.o.)  MRN: [id number]  CODE STATUS: Full Code    Date of Service: 22       Restrictions:  Restrictions/Precautions: Up as Tolerated  Position Activity Restriction  Other position/activity restrictions: DELIO drain; abd binder, abd incision/covered with dresssing, LUE IV, clear liquid diet, abd precautions, bed alarm, pt may shower     SUBJECTIVE  Subjective  Subjective: pt up in chair upon arrival agreeable to PT        Post Treatment Pain Screening         OBJECTIVE  Cognition  Overall Cognitive Status: Exceptions  Arousal/Alertness: Appropriate responses to stimuli  Following Commands: Follows all commands without difficulty  Attention Span: Appears intact  Memory: Appears intact  Safety Judgement: Decreased awareness of need for safety  Problem Solving: Assistance required to correct errors made;Assistance required to identify errors made;Decreased awareness of errors  Insights: Decreased awareness of deficits  Initiation: Does not require cues  Sequencing: Requires cues for some  Orientation  Overall Orientation Status: Within Functional Limits  Orientation Level: Oriented X4    Functional Mobility  Transfers  Surface: To chair without arms;From chair with arms  Additional Factors: Verbal cues; Hand placement cues  Device: Walker  Sit to Stand  Assistance Level: Contact guard assist  Stand to Sit  Assistance Level: Contact guard assist  Stand Pivot  Assistance Level: Contact guard assist  Sit Pivot  Assistance Level: Contact guard assist      Environmental Mobility  Ambulation  Surface: Level surface  Device: Rolling walker  Distance: 150 ft  Activity: Within Unit  Additional Factors: Verbal cues  Assistance Level: Contact guard assist  Gait Deviations: Decreased step length bilateral;Slow annalisa                  AM-PAC Inpatient Mobility Raw Score : 20  AM-PAC Inpatient T-Scale Score : 47.67  Mobility Inpatient CMS 0-100% Score: 35.83  Mobility Inpatient CMS G-Code Modifier:CJ          ASSESSMENT/PROGRESS TOWARDS GOALS       Assessment  Pt able to increase amb. Distance    Discharge Recommendations: Continue to assess pending progress    Goals  Patient Goals   Patient goals : pt goal is d/c home healthy  Short Term Goals  Time Frame for Short term goals: 8  Short term goal 1: Pt ind bed mob  Short term goal 2: Pt ind with transfers  Short term goal 3: Pt ind w/ gait x 300 ft w/o device  Short term goal 4: Pt ind w/ HEP    PLAN OF CARE/SAFETY  Plan  Plan: 1 time a day 7 days a week  Specific Instructions for Next Treatment: gait / mobility  Current Treatment Recommendations: Strengthening;ROM;Balance training;Functional mobility training;Transfer training; Endurance training;Gait training;Stair training;Home exercise program;Safety education & training;Patient/Caregiver education & training;Positioning  Safety Devices  Type of Devices: Bed alarm in place;Call light within reach; Heels elevated for pressure relief;Patient at risk for falls; Left in bed    Returned to see pt from [id number] for amb. 150 ft using RW, pt complained of increased pain but denied the need for pain meds.           Therapy Time   Individual Concurrent Group Co-treatment   Time In 1133         Time Out 1150         Minutes 17                   ERIC URIBE PTA, 05/17/22 at 2:37 PM

## 2022-05-18 VITALS
HEIGHT: 68 IN | OXYGEN SATURATION: 98 % | WEIGHT: 207 LBS | RESPIRATION RATE: 16 BRPM | BODY MASS INDEX: 31.37 KG/M2 | TEMPERATURE: 98.1 F | SYSTOLIC BLOOD PRESSURE: 128 MMHG | DIASTOLIC BLOOD PRESSURE: 76 MMHG | HEART RATE: 83 BPM

## 2022-05-18 LAB
ABSOLUTE EOS #: 0.18 K/UL (ref 0–0.44)
ABSOLUTE IMMATURE GRANULOCYTE: 0.02 K/UL (ref 0–0.3)
ABSOLUTE LYMPH #: 0.97 K/UL (ref 1.1–3.7)
ABSOLUTE MONO #: 0.82 K/UL (ref 0.1–1.2)
ANION GAP SERPL CALCULATED.3IONS-SCNC: 8 MMOL/L (ref 9–17)
BASOPHILS # BLD: 0 % (ref 0–2)
BASOPHILS ABSOLUTE: <0.03 K/UL (ref 0–0.2)
BUN BLDV-MCNC: 7 MG/DL (ref 8–23)
BUN/CREAT BLD: 13 (ref 9–20)
CALCIUM SERPL-MCNC: 8.8 MG/DL (ref 8.6–10.4)
CHLORIDE BLD-SCNC: 102 MMOL/L (ref 98–107)
CO2: 27 MMOL/L (ref 20–31)
CREAT SERPL-MCNC: 0.53 MG/DL (ref 0.5–0.9)
EOSINOPHILS RELATIVE PERCENT: 3 % (ref 1–4)
GFR AFRICAN AMERICAN: >60 ML/MIN
GFR NON-AFRICAN AMERICAN: >60 ML/MIN
GFR SERPL CREATININE-BSD FRML MDRD: ABNORMAL ML/MIN/{1.73_M2}
GLUCOSE BLD-MCNC: 126 MG/DL (ref 70–99)
GLUCOSE BLD-MCNC: 129 MG/DL (ref 65–105)
GLUCOSE BLD-MCNC: 98 MG/DL (ref 65–105)
HCT VFR BLD CALC: 32.9 % (ref 36.3–47.1)
HEMOGLOBIN: 10.3 G/DL (ref 11.9–15.1)
IMMATURE GRANULOCYTES: 0 %
LYMPHOCYTES # BLD: 16 % (ref 24–43)
MAGNESIUM: 1.8 MG/DL (ref 1.6–2.6)
MCH RBC QN AUTO: 27.9 PG (ref 25.2–33.5)
MCHC RBC AUTO-ENTMCNC: 31.3 G/DL (ref 28.4–34.8)
MCV RBC AUTO: 89.2 FL (ref 82.6–102.9)
MONOCYTES # BLD: 13 % (ref 3–12)
NRBC AUTOMATED: 0 PER 100 WBC
PDW BLD-RTO: 14.8 % (ref 11.8–14.4)
PLATELET # BLD: 314 K/UL (ref 138–453)
PMV BLD AUTO: 8.8 FL (ref 8.1–13.5)
POTASSIUM SERPL-SCNC: 4.2 MMOL/L (ref 3.7–5.3)
RBC # BLD: 3.69 M/UL (ref 3.95–5.11)
RBC # BLD: ABNORMAL 10*6/UL
SEG NEUTROPHILS: 68 % (ref 36–65)
SEGMENTED NEUTROPHILS ABSOLUTE COUNT: 4.19 K/UL (ref 1.5–8.1)
SODIUM BLD-SCNC: 137 MMOL/L (ref 135–144)
WBC # BLD: 6.2 K/UL (ref 3.5–11.3)

## 2022-05-18 PROCEDURE — 6360000002 HC RX W HCPCS: Performed by: STUDENT IN AN ORGANIZED HEALTH CARE EDUCATION/TRAINING PROGRAM

## 2022-05-18 PROCEDURE — 36415 COLL VENOUS BLD VENIPUNCTURE: CPT

## 2022-05-18 PROCEDURE — 83735 ASSAY OF MAGNESIUM: CPT

## 2022-05-18 PROCEDURE — 2580000003 HC RX 258: Performed by: STUDENT IN AN ORGANIZED HEALTH CARE EDUCATION/TRAINING PROGRAM

## 2022-05-18 PROCEDURE — 97116 GAIT TRAINING THERAPY: CPT

## 2022-05-18 PROCEDURE — 82947 ASSAY GLUCOSE BLOOD QUANT: CPT

## 2022-05-18 PROCEDURE — 80048 BASIC METABOLIC PNL TOTAL CA: CPT

## 2022-05-18 PROCEDURE — 6370000000 HC RX 637 (ALT 250 FOR IP): Performed by: STUDENT IN AN ORGANIZED HEALTH CARE EDUCATION/TRAINING PROGRAM

## 2022-05-18 PROCEDURE — 97110 THERAPEUTIC EXERCISES: CPT | Performed by: NURSE PRACTITIONER

## 2022-05-18 PROCEDURE — 85025 COMPLETE CBC W/AUTO DIFF WBC: CPT

## 2022-05-18 RX ORDER — MAGNESIUM SULFATE IN WATER 40 MG/ML
2000 INJECTION, SOLUTION INTRAVENOUS ONCE
Status: COMPLETED | OUTPATIENT
Start: 2022-05-18 | End: 2022-05-18

## 2022-05-18 RX ORDER — ONDANSETRON 4 MG/1
4 TABLET, FILM COATED ORAL 3 TIMES DAILY PRN
Qty: 15 TABLET | Refills: 0 | Status: SHIPPED | OUTPATIENT
Start: 2022-05-18

## 2022-05-18 RX ORDER — CYCLOBENZAPRINE HCL 10 MG
10 TABLET ORAL 3 TIMES DAILY PRN
Status: DISCONTINUED | OUTPATIENT
Start: 2022-05-18 | End: 2022-05-18 | Stop reason: HOSPADM

## 2022-05-18 RX ORDER — HYDROCODONE BITARTRATE AND ACETAMINOPHEN 5; 325 MG/1; MG/1
1 TABLET ORAL EVERY 6 HOURS PRN
Qty: 20 TABLET | Refills: 0 | Status: SHIPPED | OUTPATIENT
Start: 2022-05-18 | End: 2022-05-23

## 2022-05-18 RX ORDER — CYCLOBENZAPRINE HCL 10 MG
10 TABLET ORAL 3 TIMES DAILY PRN
Qty: 20 TABLET | Refills: 0 | Status: SHIPPED | OUTPATIENT
Start: 2022-05-18 | End: 2022-05-28

## 2022-05-18 RX ADMIN — MAGNESIUM SULFATE 2000 MG: 2 INJECTION INTRAVENOUS at 09:24

## 2022-05-18 RX ADMIN — FAMOTIDINE 20 MG: 20 TABLET, FILM COATED ORAL at 09:04

## 2022-05-18 RX ADMIN — ENOXAPARIN SODIUM 40 MG: 100 INJECTION SUBCUTANEOUS at 09:04

## 2022-05-18 RX ADMIN — KETOROLAC TROMETHAMINE 15 MG: 15 INJECTION, SOLUTION INTRAMUSCULAR; INTRAVENOUS at 05:28

## 2022-05-18 RX ADMIN — CYCLOBENZAPRINE 10 MG: 10 TABLET, FILM COATED ORAL at 12:40

## 2022-05-18 RX ADMIN — SODIUM CHLORIDE, PRESERVATIVE FREE 10 ML: 5 INJECTION INTRAVENOUS at 09:04

## 2022-05-18 ASSESSMENT — ENCOUNTER SYMPTOMS
VOMITING: 0
COUGH: 0
ABDOMINAL PAIN: 1
NAUSEA: 0
TROUBLE SWALLOWING: 1
SINUS PAIN: 0
EYE DISCHARGE: 0

## 2022-05-18 ASSESSMENT — PAIN DESCRIPTION - LOCATION
LOCATION: ABDOMEN
LOCATION: ABDOMEN

## 2022-05-18 ASSESSMENT — PAIN DESCRIPTION - FREQUENCY
FREQUENCY: CONTINUOUS
FREQUENCY: CONTINUOUS

## 2022-05-18 ASSESSMENT — PAIN DESCRIPTION - PAIN TYPE
TYPE: SURGICAL PAIN
TYPE: SURGICAL PAIN

## 2022-05-18 ASSESSMENT — PAIN DESCRIPTION - DESCRIPTORS
DESCRIPTORS: SORE
DESCRIPTORS: SORE

## 2022-05-18 ASSESSMENT — PAIN - FUNCTIONAL ASSESSMENT
PAIN_FUNCTIONAL_ASSESSMENT: ACTIVITIES ARE NOT PREVENTED
PAIN_FUNCTIONAL_ASSESSMENT: ACTIVITIES ARE NOT PREVENTED

## 2022-05-18 ASSESSMENT — PAIN SCALES - GENERAL
PAINLEVEL_OUTOF10: 5
PAINLEVEL_OUTOF10: 3

## 2022-05-18 ASSESSMENT — PAIN DESCRIPTION - ORIENTATION
ORIENTATION: RIGHT
ORIENTATION: RIGHT

## 2022-05-18 ASSESSMENT — PAIN DESCRIPTION - ONSET
ONSET: ON-GOING
ONSET: ON-GOING

## 2022-05-18 NOTE — PLAN OF CARE
Problem: Discharge Planning  Goal: Discharge to home or other facility with appropriate resources  5/18/2022 1210 by Lokesh Gutierrez RN  Outcome: Progressing  Flowsheets (Taken 5/18/2022 0904)  Discharge to home or other facility with appropriate resources: Refer to discharge planning if patient needs post-hospital services based on physician order or complex needs related to functional status, cognitive ability or social support system  5/18/2022 0212 by Pilar Baker RN  Outcome: Progressing     Problem: Chronic Conditions and Co-morbidities  Goal: Patient's chronic conditions and co-morbidity symptoms are monitored and maintained or improved  5/18/2022 1210 by Lokesh Gutierrez RN  Outcome: Progressing  Flowsheets (Taken 5/18/2022 0904)  Care Plan - Patient's Chronic Conditions and Co-Morbidity Symptoms are Monitored and Maintained or Improved: Monitor and assess patient's chronic conditions and comorbid symptoms for stability, deterioration, or improvement  5/18/2022 0212 by Pilar Baker RN  Outcome: Progressing     Problem: Pain  Goal: Verbalizes/displays adequate comfort level or baseline comfort level  5/18/2022 1210 by Lokesh Gutierrez RN  Outcome: Progressing  5/18/2022 0212 by Pilar Baker RN  Outcome: Progressing     Problem: ABCDS Injury Assessment  Goal: Absence of physical injury  5/18/2022 1210 by Lokesh Gutierrez RN  Outcome: Progressing  5/18/2022 0212 by Pilar Baker RN  Outcome: Progressing  Flowsheets (Taken 5/17/2022 1955)  Absence of Physical Injury: Implement safety measures based on patient assessment     Problem: Safety - Adult  Goal: Free from fall injury  5/18/2022 1210 by Lokesh Gutierrez RN  Outcome: Progressing  5/18/2022 0212 by Pilar Baker RN  Outcome: Progressing  Flowsheets (Taken 5/17/2022 1955)  Free From Fall Injury: Instruct family/caregiver on patient safety     Problem: Neurosensory - Adult  Goal: Achieves maximal functionality and self care  5/18/2022 1210 by Lokesh Gutierrez RN  Outcome: Progressing  Flowsheets (Taken 5/18/2022 0904)  Achieves maximal functionality and self care: Encourage and assist patient to increase activity and self care with guidance from physical therapy/occupational therapy  5/18/2022 0212 by Lety Astorga RN  Outcome: Progressing     Problem: Cardiovascular - Adult  Goal: Absence of cardiac dysrhythmias or at baseline  5/18/2022 1210 by Tien Champion RN  Outcome: Progressing  Flowsheets (Taken 5/18/2022 0904)  Absence of cardiac dysrhythmias or at baseline: Monitor cardiac rate and rhythm  5/18/2022 0212 by Lety Astorga RN  Outcome: Progressing     Problem: Skin/Tissue Integrity - Adult  Goal: Incisions, wounds, or drain sites healing without S/S of infection  5/18/2022 1210 by Tien Champion RN  Outcome: Progressing  Flowsheets (Taken 5/18/2022 0904)  Incisions, Wounds, or Drain Sites Healing Without Sign and Symptoms of Infection: TWICE DAILY: Assess and document skin integrity  5/18/2022 0212 by Lety Astorga RN  Outcome: Progressing  Flowsheets (Taken 5/17/2022 1955)  Incisions, Wounds, or Drain Sites Healing Without Sign and Symptoms of Infection:   ADMISSION and DAILY: Assess and document risk factors for pressure ulcer development   TWICE DAILY: Assess and document dressing/incision, wound bed, drain sites and surrounding tissue     Problem: Musculoskeletal - Adult  Goal: Return mobility to safest level of function  5/18/2022 1210 by Tien Champion RN  Outcome: Progressing  Flowsheets (Taken 5/18/2022 0904)  Return Mobility to Safest Level of Function: Assess patient stability and activity tolerance for standing, transferring and ambulating with or without assistive devices  5/18/2022 0212 by Lety Astorga RN  Outcome: Progressing  Goal: Return ADL status to a safe level of function  5/18/2022 1210 by Tien Champion RN  Outcome: Progressing  Flowsheets (Taken 5/18/2022 0904)  Return ADL Status to a Safe Level of Function: Administer medication as ordered  5/18/2022 4797 by Kenneth Stout RN  Outcome: Progressing     Problem: Gastrointestinal - Adult  Goal: Minimal or absence of nausea and vomiting  5/18/2022 1210 by Fabio Lange RN  Outcome: Progressing  Flowsheets (Taken 5/18/2022 0904)  Minimal or absence of nausea and vomiting: Administer IV fluids as ordered to ensure adequate hydration  5/18/2022 0212 by Kenneth Stout RN  Outcome: Progressing  Goal: Maintains or returns to baseline bowel function  5/18/2022 1210 by Fabio Lange RN  Outcome: Progressing  Flowsheets (Taken 5/18/2022 0904)  Maintains or returns to baseline bowel function: Assess bowel function  5/18/2022 0212 by Kenneth Stout RN  Outcome: Progressing  Goal: Maintains adequate nutritional intake  5/18/2022 1210 by Fabio Lange RN  Outcome: Progressing  Flowsheets (Taken 5/18/2022 0904)  Maintains adequate nutritional intake: Monitor percentage of each meal consumed  5/18/2022 0212 by Kenneth Stout RN  Outcome: Progressing     Problem: Genitourinary - Adult  Goal: Absence of urinary retention  5/18/2022 1210 by Fabio Lange RN  Outcome: Progressing  Flowsheets (Taken 5/18/2022 0904)  Absence of urinary retention: Assess patients ability to void and empty bladder  5/18/2022 0212 by Kenneth Stout RN  Outcome: Progressing  Goal: Urinary catheter remains patent  5/18/2022 1210 by Fabio Lange RN  Outcome: Progressing  5/18/2022 0212 by Kneneth Stout RN  Outcome: Progressing     Problem: Infection - Adult  Goal: Absence of infection at discharge  5/18/2022 1210 by Fabio Lange RN  Outcome: Progressing  Flowsheets (Taken 5/18/2022 0904)  Absence of infection at discharge: Assess and monitor for signs and symptoms of infection  5/18/2022 0212 by Kenneth Stout RN  Outcome: Progressing  Goal: Absence of infection during hospitalization  5/18/2022 1210 by Fabio Lange RN  Outcome: Progressing  Flowsheets (Taken 5/18/2022 0904)  Absence of infection during hospitalization: Assess and monitor for signs and symptoms of infection  5/18/2022 0212 by Trixie Myers RN  Outcome: Progressing     Problem: Metabolic/Fluid and Electrolytes - Adult  Goal: Glucose maintained within prescribed range  5/18/2022 1210 by Omid Morrow RN  Outcome: Progressing  Flowsheets (Taken 5/18/2022 0904)  Glucose maintained within prescribed range: Monitor blood glucose as ordered  5/18/2022 0212 by Trixie Myers RN  Outcome: Progressing     Problem: Hematologic - Adult  Goal: Maintains hematologic stability  5/18/2022 1210 by Omid Morrow RN  Outcome: Progressing  Flowsheets (Taken 5/18/2022 0904)  Maintains hematologic stability: Assess for signs and symptoms of bleeding or hemorrhage  5/18/2022 0212 by Trixie Myers RN  Outcome: Progressing     Problem: Nutrition Deficit:  Goal: Optimize nutritional status  5/18/2022 1210 by Omid Morrow RN  Outcome: Progressing  5/18/2022 0212 by Trixie Myers RN  Outcome: Progressing

## 2022-05-18 NOTE — PROGRESS NOTES
Physician Progress Note      PATIENT:               Xiang Rosario  CSN #:                  [id number]  :                       1955  ADMIT DATE:       2022 6:02 AM  [address] [place] DATE:  RESPONDING  PROVIDER #:        Eric Fraire MD          QUERY TEXT:    Pt admitted for colostomy reversal.  Noted documentation of severe   malnutrition on  by dietician. If possible, please document in progress   notes and discharge summary:    The medical record reflects the following:  Risk Factors: poor appetite and unintentional weight loss, eats 2 meals/day  Clinical Indicators:  Dietician note:  patient meets criteria for severe   malnutrition in the setting of chronic illness based on Energy Intake:  75% or   less estimated energy requirements for 1 month or longer; Weight Loss:    Greater than 20% over 1 year  Treatment: monitor intake, clear liquid supplements twice daily  Options provided:  -- Severe malnutrition confirmed present on admission  -- Other - I will add my own diagnosis  -- Disagree - Not applicable / Not valid  -- Disagree - Clinically unable to determine / Unknown  -- Refer to Clinical Documentation Reviewer    PROVIDER RESPONSE TEXT:    The diagnosis of severe malnutrition was confirmed as present on admission.     Query created by: Maryann Zhao on 2022 12:11 PM      Electronically signed by:  Eric Fraire MD 2022 4:56 AM

## 2022-05-18 NOTE — PROGRESS NOTES
Occupational Therapy  Facility/Department: [facility] MED SURG  Daily Treatment Note  NAME: Mignon Andujar  : 1955  MRN: [id number]    Date of Service: 2022    Discharge Recommendations:  Patient would benefit from continued therapy after discharge   Due to recent hospitalization and medical condition, pt would benefit from additional therapy at time of discharge to ensure safety. Please refer to the AM-PAC score for current functional status. OT Equipment Recommendations  Equipment Needed: Yes  Mobility Devices: ADL Assistive Devices  ADL Assistive Devices: Toileting - 3-in-1 Commode;Grab Bars - shower; Reacher;Long-handled Shoe Horn;Long-handled Sponge;Emergency Alert System      Patient Diagnosis(es): The encounter diagnosis was Post-op pain. Assessment     Pt tolerated treatment good and has shown progress towards goals in areas of functional balance, ADL completion, safety awareness, functional transfers, UE strength, functional mobility, cognition and understanding education provided Pt would benefit from continued skilled OT services to address deficits in areas of safety awareness functional transfers endurance/activity tolerance ADL perfomanceall to ensure safe return home to [facility] and to decrease caregiver burden. Activity Tolerance: Patient tolerated treatment well  Discharge Recommendations: Patient would benefit from continued therapy after discharge  Equipment Needed: Yes  Mobility Devices: ADL [address]  Times per Week: 5x/week 1-2x/day as jules  Current Treatment Recommendations: Strengthening;Balance training;Functional mobility training; Endurance training;Neuromuscular re-education;Cognitive reorientation;Pain management; Safety education & training;Patient/Caregiver education & training;Positioning;Self-Care / ADL; Home management training     Restrictions  Restrictions/Precautions  Restrictions/Precautions: Up as Tolerated  Position Activity Restriction  Other position/activity restrictions: DELIO drain; abd binder, abd incision/covered with dresssing, LUE IV, clear liquid diet, abd precautions, bed alarm, pt may shower    Subjective   Subjective  Subjective: \"I am going home today. \"  Pain: c/o muscular pain in stomach frm coughing  Cognition  Overall Cognitive Status: WFL        Objective    OT Exercises  Exercise Treatment: Pt verbalizing good understanding and able to demo while supine in bed. Safety Devices  Type of Devices: Bed alarm in place;Call light within reach; Heels elevated for pressure relief;Patient at risk for falls; Left in bed; All scarlett prominences offloaded; All fall risk precautions in place     Patient Education  Education Given To: Patient  Education Provided: Role of Therapy;Plan of Care;Precautions; Home Exercise Program  Education Provided Comments: Positioning, importance of movement, IS use, home safety, donning abd binder in bed  Education Method: Verbal  Barriers to Learning: None  Education Outcome: Verbalized understanding;Demonstrated understanding;Continued education needed    Goals  Short Term Goals  Time Frame for Short term goals: by discharge, pt to demo  Short Term Goal 1: bed mob tasks with use of rail/prooer log rolling tech as needed to MOD I-I. Short Term Goal 2: I with abd precautions to reduce pain issue as well as BUE HEP with use of handouts to maintain strength. Short Term Goal 3: ADL transfers and functional mob to I level. Short Term Goal 4: totla body ADL tasks with use of AE/grab bar as needed to MOD I-I. Short Term Goal 5: standing to SUP and jules > 8 mins as able to reduce falls in function. Long Term Goals  Long Term Goal 1: Pt/caregivers to be I with pressure relief, EC/WS and fall prevention tech as well as DME/AE recommendations with use of handouts. Patient Goals   Patient goals : Pt states to just get home! RN reports patient is medically stable for therapy treatment this date.     Chart reviewed prior to treatment and patient is agreeable for therapy. All lines intact and patient positioned comfortably at end of treatment. All patient needs addressed prior to ending therapy session.       Therapy Time   Individual Concurrent Group Co-treatment   Time In [address]          Time Out 0959         Minutes [address] [facility]

## 2022-05-18 NOTE — DISCHARGE INSTR - ACTIVITY
-Daily changes of previous ostomy site with 1/4 inch iodoform gauze, cover with ABD and paper tape  -Take all medications as prescribed     Surgery Patient Discharge Instructions    Discharge Date:  5/12/2022    Discharged To: Home    RESUME ACTIVITY:     WOUND CARE:   Leave staples in place until office visit. BATHING:  Ok to shower    DRIVING: No driving for while on pain medication    RETURN TO WORK:   No lifting more than 10 pounds  No bending, stooping, or squatting    WALKING:    Yes    LIFTING: Avoid lifting objects heavier than 10 lbs for 4 weeks. DIET:   Ok to resume regular diet. SPECIAL INSTRUCTIONS:  After you leave the hospital, call your doctor if any of the following occurs:   Pain or symptoms that worsen   Other new symptoms   Signs of infection, including fever and chills   Nausea and/or vomiting that you can't control with the medications you were given   Pain that you can't control with the medications you've been given   Excessive tenderness or swelling   Changes in bowel or sexual function   Dizziness or lightheadedness   Rash or hives       Watch for signs of infection:    Excessive warmth or bright redness around your incisions    Leakage of bloody or cloudy fluid from you incisions    Fever over 100.5            Keep it Clean - Post-Operative Home instructions    These instructions are to help you have the best possible recovery after your surgical procedure. Chao Naranjo is here to support you. If you have questions, call [phone] Monday through Friday from 7:30AM to 8:30PM to speak to a nurse. If you need to speak to someone outside of these hours, call your physician. Incision Dos and Donts  Do wash hands before and after dressing changes or when you have had any contact with your incision. Use hand  or antibacterial soap. Do keep your incision clean and dry. Its OK to wash the skin around your incision with mild soap and water.   Do change your dressing as you were told. Do notify your doctor if the dressing becomes wet or dirty. Do use a clean washcloth every time when cleaning your incision. Do sleep on clean linens. Do keep pets away from incision site. Dont sit in a bathtub, pool, or hot tub until your incision is fully closed and any drains are removed. Dont scrub, pick, scratch, or pull at your incision. Dont use oils, lotions, or creams on your incision unless your healthcare provider approves it. Follow-up  You will have one or more follow-up visits with your healthcare provider. These are needed to check how well youre healing. Your drain, stitches, or staples may also be removed during these visits. Do not miss your follow-up visit, even if you are feeling better. Call your healthcare provider right away if you have the following:  Fever of 100.4°F (38°C) or higher, or as advised by your healthcare provider. Chest pain or trouble breathing. Pain or tenderness in your leg(s). Increased pain, redness, swelling, bleeding, or foul-smelling drainage at the incision site. Incision changes, separates or is hot to the touch. Problems with the drain if you have one. Itchy, swollen skin; skin rash.     Medicines, Diet, and Activity:   Refer to your discharge paperwork for further instructions

## 2022-05-18 NOTE — PROGRESS NOTES
Physical Therapy  Facility/Department: [facility] SURG  Rehabilitation Physical Therapy Treatment Note    NAME: Kaylah John  : 1955 (73 y.o.)  MRN: [id number]  CODE STATUS: Full Code    Date of Service: 22       Restrictions:  Restrictions/Precautions: Up as Tolerated  Position Activity Restriction  Other position/activity restrictions: DELIO drain; abd binder, abd incision/covered with dresssing, LUE IV, clear liquid diet, abd precautions, bed alarm, pt may shower     SUBJECTIVE  Subjective  Subjective: pt up in chair upon arrival agreeable to PT with encouragement        Post Treatment Pain Screening         OBJECTIVE  Cognition  Overall Cognitive Status: WFL  Arousal/Alertness: Appropriate responses to stimuli  Following Commands:  Follows all commands without difficulty  Attention Span: Appears intact  Memory: Appears intact  Orientation  Overall Orientation Status: Within Functional Limits  Orientation Level: Oriented X4    Functional Mobility  Scooting  Assistance Level: Contact guard assist  Balance  Sitting Balance: Contact guard assistance  Sit to Stand  Assistance Level: Contact guard assist  Stand to Sit  Assistance Level: Contact guard assist  Sit Pivot  Assistance Level: Contact guard assist      Environmental Mobility  Ambulation  Surface: Level surface  Device: Rolling walker  Distance: 160 ft  Activity: Within Unit  Additional Factors: Verbal cues  Assistance Level: Contact guard assist  Gait Deviations: Decreased step length bilateral;Slow annalisa                    ASSESSMENT/PROGRESS TOWARDS GOALS       Assessment  Activity Tolerance: Patient tolerated treatment well  Discharge Recommendations: Continue to assess pending progress    Goals  Patient Goals   Patient goals : pt goal is d/c home healthy  Short Term Goals  Time Frame for Short term goals: 8  Short term goal 1: Pt ind bed mob  Short term goal 2: Pt ind with transfers  Short term goal 3: Pt ind w/ gait x 300 ft w/o device  Short term goal 4: Pt ind w/ HEP    PLAN OF CARE/SAFETY  Plan  Specific Instructions for Next Treatment: gait / mobility  Current Treatment Recommendations: Strengthening;ROM;Balance training;Functional mobility training;Transfer training; Endurance training;Gait training;Stair training;Home exercise program;Safety education & training;Patient/Caregiver education & training;Positioning  Safety Devices  Type of Devices: Bed alarm in place;Call light within reach; Heels elevated for pressure relief;Patient at risk for falls; Left in bed; All scarlett prominences offloaded; All fall risk precautions in place      Therapy Time   Individual Concurrent Group Co-treatment   Time In 1116         Time Out 1132         Minutes 16                   ERIC URIBE PTA, 05/18/22 at 12:50 PM

## 2022-05-18 NOTE — PLAN OF CARE
Problem: Discharge Planning  Goal: Discharge to home or other facility with appropriate resources  5/18/2022 1626 by Lokesh Gutierrez RN  Outcome: Completed  5/18/2022 1626 by Lokesh Gutierrez RN  Outcome: Completed  5/18/2022 1210 by Lokesh Gutierrez RN  Outcome: Progressing  Flowsheets (Taken 5/18/2022 1369)  Discharge to home or other facility with appropriate resources: Refer to discharge planning if patient needs post-hospital services based on physician order or complex needs related to functional status, cognitive ability or social support system     Problem: Chronic Conditions and Co-morbidities  Goal: Patient's chronic conditions and co-morbidity symptoms are monitored and maintained or improved  5/18/2022 1626 by Lokesh Gutierrez RN  Outcome: Completed  5/18/2022 1626 by Lokesh Gutierrez RN  Outcome: Completed  5/18/2022 1210 by Lokesh Gutierrez RN  Outcome: Progressing  Flowsheets (Taken 5/18/2022 0904)  Care Plan - Patient's Chronic Conditions and Co-Morbidity Symptoms are Monitored and Maintained or Improved: Monitor and assess patient's chronic conditions and comorbid symptoms for stability, deterioration, or improvement     Problem: Pain  Goal: Verbalizes/displays adequate comfort level or baseline comfort level  5/18/2022 1626 by Lokesh Gutierrez RN  Outcome: Completed  5/18/2022 1626 by Lokesh Gutierrez RN  Outcome: Completed  5/18/2022 1210 by Lokesh Gutierrez RN  Outcome: Progressing     Problem: ABCDS Injury Assessment  Goal: Absence of physical injury  5/18/2022 1626 by Lokesh Gutierrez RN  Outcome: Completed  5/18/2022 1626 by Lokesh Gutierrez RN  Outcome: Completed  5/18/2022 1210 by Lokesh Gutierrez RN  Outcome: Progressing     Problem: Safety - Adult  Goal: Free from fall injury  5/18/2022 1626 by Lokesh Gutierrez RN  Outcome: Completed  5/18/2022 1210 by Lokesh Gutierrez RN  Outcome: Progressing     Problem: Neurosensory - Adult  Goal: Achieves maximal functionality and self care  5/18/2022 1626 by Loeksh Gutierrez RN  Outcome: Completed  5/18/2022 1210 by Irma Lane RN  Outcome: Progressing  Flowsheets (Taken 5/18/2022 0904)  Achieves maximal functionality and self care: Encourage and assist patient to increase activity and self care with guidance from physical therapy/occupational therapy     Problem: Cardiovascular - Adult  Goal: Absence of cardiac dysrhythmias or at baseline  5/18/2022 1626 by Irma Lane RN  Outcome: Completed  5/18/2022 1210 by Irma Lane RN  Outcome: Progressing  Flowsheets (Taken 5/18/2022 0904)  Absence of cardiac dysrhythmias or at baseline: Monitor cardiac rate and rhythm     Problem: Skin/Tissue Integrity - Adult  Goal: Incisions, wounds, or drain sites healing without S/S of infection  5/18/2022 1626 by Irma Lane RN  Outcome: Completed  5/18/2022 1210 by Irma Lane RN  Outcome: Progressing  Flowsheets (Taken 5/18/2022 0904)  Incisions, Wounds, or Drain Sites Healing Without Sign and Symptoms of Infection: TWICE DAILY: Assess and document skin integrity     Problem: Musculoskeletal - Adult  Goal: Return mobility to safest level of function  5/18/2022 1626 by Irma Lane RN  Outcome: Completed  5/18/2022 1210 by Irma Lane RN  Outcome: Progressing  Flowsheets (Taken 5/18/2022 0904)  Return Mobility to Safest Level of Function: Assess patient stability and activity tolerance for standing, transferring and ambulating with or without assistive devices  Goal: Return ADL status to a safe level of function  5/18/2022 1626 by Irma Lane RN  Outcome: Completed  5/18/2022 1210 by Irma Laen RN  Outcome: Progressing  Flowsheets (Taken 5/18/2022 0904)  Return ADL Status to a Safe Level of Function: Administer medication as ordered     Problem: Gastrointestinal - Adult  Goal: Minimal or absence of nausea and vomiting  5/18/2022 1626 by Irma Lane RN  Outcome: Completed  5/18/2022 1210 by Irma Lane RN  Outcome: Progressing  Flowsheets (Taken 5/18/2022 0904)  Minimal or absence of nausea and vomiting: Administer IV fluids as ordered to ensure adequate hydration  Goal: Maintains or returns to baseline bowel function  5/18/2022 1626 by Brennan Maldonado RN  Outcome: Completed  5/18/2022 1210 by Brennan Maldonado RN  Outcome: Progressing  Flowsheets (Taken 5/18/2022 0904)  Maintains or returns to baseline bowel function: Assess bowel function  Goal: Maintains adequate nutritional intake  5/18/2022 1626 by Brennan Maldonado RN  Outcome: Completed  5/18/2022 1210 by Brennan Maldonado RN  Outcome: Progressing  Flowsheets (Taken 5/18/2022 0904)  Maintains adequate nutritional intake: Monitor percentage of each meal consumed     Problem: Genitourinary - Adult  Goal: Absence of urinary retention  5/18/2022 1626 by Brennan Maldonado RN  Outcome: Completed  5/18/2022 1210 by Brennan Maldonado RN  Outcome: Progressing  Flowsheets (Taken 5/18/2022 0904)  Absence of urinary retention: Assess patients ability to void and empty bladder  Goal: Urinary catheter remains patent  5/18/2022 1626 by Brennan Maldonado RN  Outcome: Completed  5/18/2022 1210 by Brennan Maldonado RN  Outcome: Progressing     Problem: Infection - Adult  Goal: Absence of infection at discharge  5/18/2022 1626 by Brennan Maldonado RN  Outcome: Completed  5/18/2022 1210 by Brennan Maldonado RN  Outcome: Progressing  Flowsheets (Taken 5/18/2022 0904)  Absence of infection at discharge: Assess and monitor for signs and symptoms of infection  Goal: Absence of infection during hospitalization  5/18/2022 1626 by Brennan Maldonado RN  Outcome: Completed  5/18/2022 1210 by Brennan Maldonado RN  Outcome: Progressing  Flowsheets (Taken 5/18/2022 0904)  Absence of infection during hospitalization: Assess and monitor for signs and symptoms of infection     Problem: Metabolic/Fluid and Electrolytes - Adult  Goal: Glucose maintained within prescribed range  5/18/2022 1626 by Brennan Maldonado RN  Outcome: Completed  5/18/2022 1210 by Brennan Maldonado RN  Outcome: Progressing  Flowsheets (Taken 5/18/2022 1508)  Glucose maintained within prescribed range: Monitor blood glucose as ordered     Problem: Hematologic - Adult  Goal: Maintains hematologic stability  5/18/2022 1626 by Earnestine Sal RN  Outcome: Completed  5/18/2022 1210 by Earnestine Sal RN  Outcome: Progressing  Flowsheets (Taken 5/18/2022 0904)  Maintains hematologic stability: Assess for signs and symptoms of bleeding or hemorrhage     Problem: Nutrition Deficit:  Goal: Optimize nutritional status  5/18/2022 1626 by Earnestine Sal RN  Outcome: Completed  5/18/2022 1210 by Earnestine Sal RN  Outcome: Progressing

## 2022-05-18 NOTE — PROGRESS NOTES
Dr. Rod Palacios and surgical resident at patient's bedside. DELIO drain removed and surgical dressing changed.

## 2022-05-18 NOTE — PROGRESS NOTES
General Surgery:  Daily Progress Note          status post colostomy reversal          PATIENT NAME: Elizabeth Sandoval     TODAY'S DATE: 5/18/2022, 6:28 AM  CC: Nominal pain    SUBJECTIVE:     Patient was seen and evaluated at bedside. No acute events overnight. Patient having multiple bowel movements, continues to pass flatus. Denies nausea and complains of muscle spasm type pain in the lower aspect of her incision. DELIO drain with 30 mL of serosanguineous output overnight. Review of Systems   Constitutional: Positive for fatigue. Negative for chills. HENT: Positive for trouble swallowing. Negative for sinus pain. Eyes: Negative for discharge. Respiratory: Negative for cough. Cardiovascular: Negative for palpitations. Gastrointestinal: Positive for abdominal pain. Negative for nausea and vomiting. Genitourinary: Negative for difficulty urinating. Musculoskeletal: Negative for myalgias. Neurological: Negative for speech difficulty. Psychiatric/Behavioral: Negative for agitation. OBJECTIVE:   VITALS:  /67   Pulse 92   Temp 98.4 °F (36.9 °C) (Oral)   Resp 18   Ht 5' 8\" (1.727 m)   Wt 207 lb 0.1 oz (93.9 kg)   SpO2 98%   BMI 31.47 kg/m²      INTAKE/OUTPUT:      Intake/Output Summary (Last 24 hours) at 5/18/2022 0708  Last data filed at 5/18/2022 0539  Gross per 24 hour   Intake 1283 ml   Output 2400 ml   Net -1117 ml       PHYSICAL EXAM:  General Appearance: Awake, alert and oriented, in bed comfortably  HEENT:  Normocephalic, atraumatic  Heart: Heart regular rate and rhythm  Lungs: Normal chest wall expansion with respiration  Abdomen: Soft,, nondistended, appropriate periincisional tenderness, no rebound nonperitoneal  Incision: Clean and intact with staples, iodoform packing in place. No surrounding cellulitis or signs of infection, right lower quadrant DELIO drain with serosanguineous output  Extremities: No cyanosis, pitting edema, rashes noted.     Skin: Normal turgor, no peggy    IV Access:  PIV       Data:  CBC with Differential:    Lab Results   Component Value Date    WBC 6.2 05/18/2022    RBC 3.69 05/18/2022    HGB 10.3 05/18/2022    HCT 32.9 05/18/2022     05/18/2022    MCV 89.2 05/18/2022    MCH 27.9 05/18/2022    MCHC 31.3 05/18/2022    RDW 14.8 05/18/2022    LYMPHOPCT 16 05/18/2022    MONOPCT 13 05/18/2022    BASOPCT 0 05/18/2022    MONOSABS 0.82 05/18/2022    LYMPHSABS 0.97 05/18/2022    EOSABS 0.18 05/18/2022    BASOSABS <0.03 05/18/2022    DIFFTYPE NOT REPORTED 12/14/2021     BMP:    Lab Results   Component Value Date     05/18/2022    K 4.2 05/18/2022     05/18/2022    CO2 27 05/18/2022    BUN 7 05/18/2022    LABALBU 2.9 10/25/2021    CREATININE 0.53 05/18/2022    CALCIUM 8.8 05/18/2022    GFRAA >60 05/18/2022    LABGLOM >60 05/18/2022    GLUCOSE 126 05/18/2022       Radiology Review: No new imaging to review    ASSESSMENT:  Active Hospital Problems    Diagnosis Date Noted    History of colostomy reversal [Z98.890] 05/12/2022     Priority: Medium       1. 73 y.o. female status post colostomy reversal on 5/12/2022. Plan:  1. Patient is tolerating regular texture diet  2. Patient is appropriate for discharge. She will follow-up in 7 to 10 days with . 3. Home health care for assistance with dressing changes -Daily changes of previous ostomy site with 1/4 inch iodoform gauze, cover with ABD and paper tape      Electronically signed by Crystal Silva MD  on 5/18/2022 at 6:28 AM   Attending Physician Statement  I have discussed the case, including pertinent history and exam findings with the resident. I agree with the assessment, plan and orders as documented by the resident. Doing well.   For home today

## 2022-05-18 NOTE — DISCHARGE INSTR - COC
Continuity of Care Form    Patient Name: Mignon Andujar   :  1955  MRN:  [id number]    Admit date:  2022  Discharge date:  22    Code Status Order: Full Code   Advance Directives:   [facility] Documentation       Date/Time Healthcare Directive Type of Healthcare Directive Copy in [address] Agent's Name Healthcare Agent's Phone Number    [phone] No, patient does not have an advance directive for healthcare treatment -- -- -- -- --            Admitting Physician:  César Burden MD  PCP: Mace Riedel, MD    Discharging Nurse: [facility] Unit/Room#:   Discharging Unit Phone Number: [phone]    Emergency Contact:   Extended Emergency Contact Information  Primary Emergency Contact: [address] Phone: [phone]  Relation: Spouse  Secondary Emergency Contact: dean dill  Mobile Phone: [phone]  Relation: Brother/Sister    Past Surgical History:  Past Surgical History:   Procedure Laterality Date    COLONOSCOPY      COLOSTOMY  2021    By Dr. Balbina Wilde N/A 2022    [facility] OF PARASTOMAL HERNIA performed by César Burden MD at [address] [place] [address]      umbilical    HERNIA REPAIR  2016    incisional with mesh, robotic converted to open    SIGMOID COLECTOMY N/A 2021    BOWEL RESECTION SIGMOID performed by César Burden MD at [address] [place] [address]         Immunization History:   Immunization History   Administered Date(s) Administered    Tdap (Boostrix, Adacel) 2011       Active Problems:  Patient Active Problem List   Diagnosis Code    Incisional hernia K43.2    Intra-abdominal abscess ([address] K65.1    Diverticulitis of intestine with abscess K57.80    Mild malnutrition ([address] E44.1    S/P colectomy Z90.49    Diverticular disease K57.90    Severe malnutrition ([address] E43    History of colostomy reversal Z98.890       Isolation/Infection:   Isolation            No Isolation          Patient Infection Status       None to display            Nurse Assessment:  Last Vital Signs: /76   Pulse 83   Temp 98.1 °F (36.7 °C) (Oral)   Resp 16   Ht 5' 8\" (1.727 m)   Wt 207 lb 0.1 oz (93.9 kg)   SpO2 98%   BMI 31.47 kg/m²     Last documented pain score (0-10 scale): Pain Level: 5  Last Weight:   Wt Readings from Last 1 Encounters:   05/18/22 207 lb 0.1 oz (93.9 kg)     Mental Status:  oriented and alert    IV Access:  - None    Nursing Mobility/ADLs:  Walking   Independent  Transfer  Independent  Bathing  Independent  Dressing  Independent  Toileting  Independent  Feeding  Independent  Med Admin  Independent  Med Delivery   none    Wound Care Documentation and Therapy:  Incision 05/12/22 Abdomen Left (Active)   Dressing Status Dry; Intact; Clean 05/17/22 1955   Dressing Change Due 05/18/22 05/17/22 1657   Incision Cleansed Other (Comment) 05/17/22 1215   Dressing/Treatment ABD pad; Other (comment) 05/17/22 1955   Closure Staples 05/17/22 1955   Margins Approximated 05/17/22 1215   Incision Assessment Other (Comment) 05/17/22 1955   Drainage Amount Small 05/17/22 1215   Drainage Description Serosanguinous 05/17/22 1215   Odor None 05/17/22 1955   Sugey-incision Assessment Other (Comment) 05/17/22 1955   Number of days: 6       Incision 05/12/22 Abdomen Lower (Active)   Dressing Status Clean;Dry; Intact 05/17/22 1955   Dressing Change Due 05/18/22 05/17/22 1657   Incision Cleansed Other (Comment) 05/17/22 1215   Dressing/Treatment ABD pad 05/17/22 1955   Closure Staples 05/17/22 1955   Margins Approximated 05/17/22 1215   Incision Assessment Other (Comment) 05/17/22 1955   Drainage Amount Small 05/17/22 1215   Drainage Description Serosanguinous 05/17/22 1215   Odor None 05/17/22 1955   Sugey-incision Assessment Other (Comment) 05/17/22 1955   Number of days: 6        Elimination:  Continence:    Bowel: YES  Bladder: Yes  Urinary Catheter: None   Colostomy/Ileostomy/Ileal Conduit: No       Date of Last BM: 5/18/2022    Intake/Output Summary (Last 24 hours) at 5/18/2022 1132  Last data filed at 5/18/2022 0539  Gross per 24 hour   Intake 447.72 ml   Output 1550 ml   Net -1102.28 ml     I/O last 3 completed shifts: In: 1440.5 [P.O.:300; I.V.:1140.5]  Out: 3015 [Urine:2850; Drains:165]    Safety Concerns:     None    Impairments/Disabilities:      None    Nutrition Therapy:  Current Nutrition Therapy:   - Oral Diet:  Low Fiber    Routes of Feeding: Oral  Liquids: No Restrictions  Daily Fluid Restriction: no  Last Modified Barium Swallow with Video (Video Swallowing Test): not done    Treatments at the Time of Hospital Discharge:   Respiratory Treatments: none  Oxygen Therapy:  is not on home oxygen therapy. Ventilator:    - No ventilator support    Rehab Therapies: Physical Therapy  Weight Bearing Status/Restrictions: No weight bearing restrictions  Other Medical Equipment (for information only, NOT a DME order): Abdominal Binder  Other Treatments:   SN Wound care  -Daily changes of previous ostomy site with 1/4 inch iodoform gauze, cover with ABD and paper tape     Patient's personal belongings (please select all that are sent with patient):  Glasses, Hearing Aides bilateral, clothing,cell phone, ,money,wallet,purse    RN SIGNATURE:  Electronically signed by Maggie Blanco RN on 5/18/22 at 3:07 PM EDT    CASE MANAGEMENT/SOCIAL WORK SECTION    Inpatient Status Date: ***    Readmission Risk Assessment Score:  Readmission Risk              Risk of Unplanned Readmission:  9           Discharging to Facility/ Agency   Name: [facility]   Address: [address] [place] [address]  Phone: [phone]  Fax: Home care agency will pull data electronically.         / signature: Electronically signed by Anne Wagner RN on 5/18/22 at 12:37 PM EDT    PHYSICIAN SECTION    Prognosis: Good    Condition at Discharge: Stable    Rehab Potential (if transferring to Rehab): Good    Recommended Labs or Other Treatments After Discharge: wound care    Physician Certification: I certify the above information and transfer of Paul Rivas  is necessary for the continuing treatment of the diagnosis listed and that she requires Home Care for greater 30 days.      Update Admission H&P: No change in H&P    PHYSICIAN SIGNATURE:  Electronically signed by Nadia Terry MD on 5/18/22 at 3:28 PM EDT

## 2022-05-18 NOTE — CARE COORDINATION
DC Planning    Spoke with pt at bedside. She will need [facility] for daily abd dressing changes. Discussed with pt she is teachable and willing to learn how to change per self. Had [employer] in the past and prefers to use them again-she relays her friend works for them too. Referral sent to [facility]. She has been accepted per Ameena-marsha primary nurse. She will need dressings sent home with her. Susan Jameson NEEDS SIGNED. Home care agency will pull data electronically.

## 2022-05-18 NOTE — PLAN OF CARE
Problem: Discharge Planning  Goal: Discharge to home or other facility with appropriate resources  Outcome: Progressing     Problem: Chronic Conditions and Co-morbidities  Goal: Patient's chronic conditions and co-morbidity symptoms are monitored and maintained or improved  Outcome: Progressing     Problem: Pain  Goal: Verbalizes/displays adequate comfort level or baseline comfort level  Outcome: Progressing     Problem: ABCDS Injury Assessment  Goal: Absence of physical injury  Outcome: Progressing  Flowsheets (Taken 5/17/2022 1955)  Absence of Physical Injury: Implement safety measures based on patient assessment     Problem: Safety - Adult  Goal: Free from fall injury  Outcome: Progressing  Flowsheets (Taken 5/17/2022 1955)  Free From Fall Injury: Instruct family/caregiver on patient safety     Problem: Neurosensory - Adult  Goal: Achieves maximal functionality and self care  Outcome: Progressing     Problem: Cardiovascular - Adult  Goal: Absence of cardiac dysrhythmias or at baseline  Outcome: Progressing     Problem: Skin/Tissue Integrity - Adult  Goal: Incisions, wounds, or drain sites healing without S/S of infection  Outcome: Progressing  Flowsheets (Taken 5/17/2022 1955)  Incisions, Wounds, or Drain Sites Healing Without Sign and Symptoms of Infection:   ADMISSION and DAILY: Assess and document risk factors for pressure ulcer development   TWICE DAILY: Assess and document dressing/incision, wound bed, drain sites and surrounding tissue     Problem: Musculoskeletal - Adult  Goal: Return mobility to safest level of function  Outcome: Progressing  Goal: Return ADL status to a safe level of function  Outcome: Progressing     Problem: Gastrointestinal - Adult  Goal: Minimal or absence of nausea and vomiting  Outcome: Progressing  Goal: Maintains or returns to baseline bowel function  Outcome: Progressing  Goal: Maintains adequate nutritional intake  Outcome: Progressing     Problem: Genitourinary - Adult  Goal: Absence of urinary retention  Outcome: Progressing  Goal: Urinary catheter remains patent  Outcome: Progressing     Problem: Infection - Adult  Goal: Absence of infection at discharge  Outcome: Progressing  Goal: Absence of infection during hospitalization  Outcome: Progressing     Problem: Metabolic/Fluid and Electrolytes - Adult  Goal: Glucose maintained within prescribed range  Outcome: Progressing     Problem: Hematologic - Adult  Goal: Maintains hematologic stability  Outcome: Progressing     Problem: Nutrition Deficit:  Goal: Optimize nutritional status  Outcome: Progressing

## 2022-05-18 NOTE — PROGRESS NOTES
CLINICAL PHARMACY NOTE: MEDS TO BEDS    Total # of Prescriptions Filled: 3   The following medications were delivered to the patient:  · norco 5-325  · Cyclobenzaprine 10mg  · Ondansetron 4mg    Additional Documentation:

## 2022-05-18 NOTE — PROGRESS NOTES
Pt discharged to home in stable condition with [facility]  Discharge instructions given  \"Meds To Beds\" medication at bedside  Pt denies having any further questions at this time  Personal items given to patient at discharge  Patient/family state they have everything they were admitted with. Dressing change supplies and hibiclens sent home with patient.   Home with [facility]

## 2022-05-20 NOTE — DISCHARGE SUMMARY
multivitamin-minerals tablet     vitamin D 50 MCG (2000 UT) Caps capsule           Where to Get Your Medications      These medications were sent to [facility] [address], [address] [place] [address]    Phone: [phone]   · cyclobenzaprine 10 MG tablet  · HYDROcodone-acetaminophen 5-325 MG per tablet  · ondansetron 4 MG tablet          HPI and Hospital Course:   73 y.o. female presented on 5/12/2022 for reversal of colostomy. She was followed postoperatively and diet was advanced as tolerated. Hospital course was unremarkable. On day of discharge pt was tolerating regular diet, pain controlled with oral medications and ambulating without difficulty.       Electronically signed by Stew Dixon MD on 5/20/2022 at 6:30 AM

## 2023-03-08 ENCOUNTER — HOSPITAL ENCOUNTER (OUTPATIENT)
Dept: PREADMISSION TESTING | Age: 68
Discharge: HOME | End: 2023-03-08
Payer: MEDICARE

## 2023-03-08 VITALS
HEART RATE: 84 BPM | OXYGEN SATURATION: 97 % | SYSTOLIC BLOOD PRESSURE: 116 MMHG | WEIGHT: 209.5 LBS | RESPIRATION RATE: 18 BRPM | HEIGHT: 68 IN | DIASTOLIC BLOOD PRESSURE: 68 MMHG | TEMPERATURE: 97.3 F | BODY MASS INDEX: 31.75 KG/M2

## 2023-03-08 LAB
ALBUMIN SERPL-MCNC: 4.2 G/DL (ref 3.5–5.2)
ALP SERPL-CCNC: 103 U/L (ref 35–104)
ALT SERPL-CCNC: 20 U/L (ref 5–33)
ANION GAP SERPL CALCULATED.3IONS-SCNC: 9 MMOL/L (ref 9–17)
AST SERPL-CCNC: 23 U/L
BILIRUB SERPL-MCNC: 0.3 MG/DL (ref 0.3–1.2)
BUN SERPL-MCNC: 18 MG/DL (ref 8–23)
BUN/CREAT BLD: 24 (ref 9–20)
CALCIUM SERPL-MCNC: 9.2 MG/DL (ref 8.6–10.4)
CHLORIDE SERPL-SCNC: 103 MMOL/L (ref 98–107)
CO2 SERPL-SCNC: 28 MMOL/L (ref 20–31)
CREAT SERPL-MCNC: 0.75 MG/DL (ref 0.5–0.9)
EST. AVERAGE GLUCOSE BLD GHB EST-MCNC: 117 MG/DL
GFR SERPL CREATININE-BSD FRML MDRD: >60 ML/MIN/1.73M2
GLUCOSE SERPL-MCNC: 101 MG/DL (ref 70–99)
HBA1C MFR BLD: 5.7 % (ref 4–6)
HCT VFR BLD AUTO: 43.6 % (ref 36.3–47.1)
HGB BLD-MCNC: 13.9 G/DL (ref 11.9–15.1)
MCH RBC QN AUTO: 29.1 PG (ref 25.2–33.5)
MCHC RBC AUTO-ENTMCNC: 31.9 G/DL (ref 28.4–34.8)
MCV RBC AUTO: 91.4 FL (ref 82.6–102.9)
NRBC AUTOMATED: 0 PER 100 WBC
PDW BLD-RTO: 13.4 % (ref 11.8–14.4)
PLATELET # BLD AUTO: 281 K/UL (ref 138–453)
PMV BLD AUTO: 9.6 FL (ref 8.1–13.5)
POTASSIUM SERPL-SCNC: 4.4 MMOL/L (ref 3.7–5.3)
PROT SERPL-MCNC: 7.5 G/DL (ref 6.4–8.3)
RBC # BLD: 4.77 M/UL (ref 3.95–5.11)
SODIUM SERPL-SCNC: 140 MMOL/L (ref 135–144)
WBC # BLD AUTO: 5.4 K/UL (ref 3.5–11.3)

## 2023-03-08 PROCEDURE — 36415 COLL VENOUS BLD VENIPUNCTURE: CPT

## 2023-03-08 PROCEDURE — 85027 COMPLETE CBC AUTOMATED: CPT

## 2023-03-08 PROCEDURE — 83036 HEMOGLOBIN GLYCOSYLATED A1C: CPT

## 2023-03-08 PROCEDURE — 80053 COMPREHEN METABOLIC PANEL: CPT

## 2023-03-08 PROCEDURE — 93005 ELECTROCARDIOGRAM TRACING: CPT | Performed by: ANESTHESIOLOGY

## 2023-03-08 RX ORDER — METRONIDAZOLE 10 MG/G
1 GEL TOPICAL DAILY
COMMUNITY

## 2023-03-08 RX ORDER — DOXYCYCLINE HYCLATE 50 MG/1
1 TABLET, FILM COATED ORAL DAILY
COMMUNITY

## 2023-03-08 ASSESSMENT — PAIN SCALES - GENERAL: PAINLEVEL_OUTOF10: 0

## 2023-03-08 NOTE — PRE-PROCEDURE INSTRUCTIONS
ARRIVE AT [facility] ON Thursday, 3/23/23 at 6:30 PM    Once you enter the hospital lobby, take the elevators to the second floor. Check-In is at the surgery registration desk. Continue to take your home medications as you normally do up to and including the night before surgery with the exception of any blood thinning medications. Please stop any blood thinning medications as directed by your surgeon or prescribing physician. Failure to stop certain medications may interfere with your scheduled surgery. These may include:  Aspirin, Warfarin (Coumadin), Clopidogrel (Plavix), Ibuprofen (Motrin, Advil), Naproxen (Aleve), Meloxicam (Mobic), Celecoxib (Celebrex), Eliquis, Pradaxa, Xarelto, Effient, Fish Oil, Herbal supplements. Stop MVI 1 week prior to surgery. If you are diabetic, do not take any of your diabetic medications by mouth the morning of surgery. If you are taking insulin contact the doctor that manages your diabetes for instructions about any changes to your insulin dosages the day before surgery. Do not inject insulin or other injectable diabetic medications the morning of surgery unless otherwise instructed by the doctor who manages your diabetes. Please take the following medication(s) the day of surgery with a small sip of water:  Pepcid, Doxycycline and Albuterol. Please use your inhalers at home the day of surgery. PREPARING FOR YOUR SURGERY:     Before surgery, you can play an important role in your own health. Because skin is not sterile, we need to be sure that your skin is as free of germs as possible before surgery by carefully washing before surgery. Preparing or prepping skin before surgery can reduce the risk of a surgical site infection.   Do not shave the area of your body where your surgery will be performed unless you received specific permission from your physician.     You will need to shower at home the night before surgery and the morning of surgery with a special soap called chlorhexidine gluconate (CHG*). *Not to be used by people allergic to Chlorhexidine Gluconate (CHG). Following these instructions will help you be sure that your skin is clean before surgery. Instructions on cleaning your skin before surgery: The night before your surgery: You will need to shower with warm water (not hot) and the CHG soap. Use a clean wash cloth and a clean towel. Have clean clothes available to put on after the shower. First wash your hair with regular shampoo. Rinse your hair and body thoroughly to remove the shampoo. Wash your face and genital area (private parts) with your regular soap or water only. Thoroughly rinse your body with warm water from the neck down. Turn water off to prevent rinsing the soap off too soon. With a clean wet washcloth and half of the CHG soap in the bottle, lather your entire body from the neck down. Do not use CHG soap near your eyes or ears to avoid injury to those areas. Wash thoroughly, paying special attention to the area where your surgery will be performed. Wash your body gently for five (5) minutes. Avoid scrubbing your skin too hard. Turn the water back on and rinse your body thoroughly. Pat yourself dry with a clean, soft towel. Do not apply lotion, cream or powder. Dress with clean freshly washed clothes. The morning of surgery:    Repeat shower following steps above - using remaining half of CHG soap in bottle. Patient Instructions: If you are having any type of anesthesia you are to have nothing to eat or drink after midnight the night before your surgery. This includes gum, hard candy, mints, water or smoking or chewing tobacco.  The only exception to this is a small sip of water to take with any morning dose of heart, blood pressure, or seizure medications. No alcoholic beverages for 24 hours prior to surgery.     Brush your teeth but do not swallow water.    Bring your eyeglasses and case with you. No contacts are to be worn the day of surgery. You also may bring your hearing aids. If you are on C-PAP or Bi-PAP at home and plan on staying in the hospital overnight for your surgery please bring the machine with you. Do not wear any jewelry or body piercings day of surgery. Also, NO lotion, perfume or deodorant to be used the day of surgery. No nail polish on the operative extremity (arm/leg surgeries)     If you are staying overnight with us, please bring a small bag of personal items. Please wear loose, comfortable clothing. If you are potentially going to have a cast or brace bring clothing that will fit over them. In case of illness - If you have cold or flu like symptoms (high fever, runny nose, sore throat, cough, etc.) rash, nausea, vomiting, loose stools, and/or recent contact with someone who has a contagious disease (chicken pox, measles, etc.) Please call your doctor before coming to the hospital.    If your child is having surgery please make arrangements for any other children to be cared for at home on the day of surgery. Other children are not permitted in recovery room and we want you to be able to spend time with the patient. If other arrangements are not available then we suggest that you have a second adult to stay in the waiting room. Day of Surgery/Procedure:    As a patient at [facility] you can expect quality medical and nursing care that is centered on your individual needs. Our goal is to make your surgical experience as comfortable as possible    . Transportation After Your Surgery/Procedure: You will need a friend or family member to drive you home after your procedure. Your  must be 25years of age or older and able to sign off on your discharge instructions.   A taxi cab or any other form of public transportation is not acceptable. Your friend or family member must stay at the hospital throughout your procedure. Someone must remain with you for the first 24 hours after your surgery if you receive anesthesia or medication. If you do not have someone to stay with you, your procedure may be cancelled.       If you have any other questions regarding your procedure or the day of surgery, please call [phone]      _________________________  ____________________________  Signature (Patient)              Signature (Provider) & date

## 2023-03-08 NOTE — H&P
History and Physical Service   [facility]    HISTORY AND PHYSICAL EXAMINATION            Date of Evaluation: 3/8/2023  Patient name:  Magdalene Martin  MRN:   [id number]  YOB: 1955  PCP:    Evette Rodrigues MD    History Obtained From:     Patient, medical records    History of Present Illness: This is Magdalene Martin a 78 y.o. female who presents for a pre-admission testing appointment for an upcoming [address] [place] [address] by Marlys Silva MD scheduled on 03/23/2023 at 0830 due to Incisional hernia, without obstruction or gangrene [K43.2]. The patient's chief complaint is bulging that has progressively worsened over the past 3 months. Bulging is noticed all the time and is not aggravated by specific activities. Associated symptoms include increased episodes of diarrhea. The bulging is less noticeable while wearing clothes per the patient. Prior treatment includes abdominal binder but patient states she does not wear this consistently. Denies recent falls and injuries. Patient was hospitalized for diverticulitis in late 2021 and treated with IV antibiotics through a PICC line. She ended up undergoing a sigmoid colectomy in December 2021 and had a colostomy that was eventually reversed in May 2022. She states that she had a previous hernia surgery and was told by Dr. Damaso Perry that he had to alter the mesh from that surgery when he did her sigmoid colectomy procedure. She first began noticing bulging in her right lower abdomen area about 3 months ago and was told it is likely due to alterations in past surgeries with the existing mesh. Functional Capacity per pt:  1) Pt is able to walk 2 city blocks on level ground without SOB. 2) Pt is able to climb 2 flights of stairs without SOB. 3) Pt is not able to walk up a hill for 1-2 city blocks without SOB.     Past Medical History:     Past Medical History:   Diagnosis Date    Arthritis     knee, back    Asthma Diabetes mellitus ([facility] [address] [address]     Diverticulitis     GERD (gastroesophageal reflux disease)     Hyperlipidemia         Past Surgical History:     Past Surgical History:   Procedure Laterality Date    COLONOSCOPY      COLOSTOMY  12/09/2021    By Dr. Jeffry Tee N/A 5/12/2022    COLOSTOMY  REVERSAL PARTIAL COLECTOMY LYSIS OF ADHESIONS REMOVAL OF MESH REPAIR OF PARASTOMAL HERNIA performed by Damaris Hopper MD at [address] [place] [address]      umbilical    HERNIA REPAIR  07/19/2016    incisional with mesh, robotic converted to open    SIGMOID COLECTOMY N/A 12/09/2021    BOWEL RESECTION SIGMOID performed by Damaris Hopper MD at [address] [place] [address]          Medications Prior to Admission:     Prior to Admission medications    Medication Sig Start Date End Date Taking? Authorizing Provider   Doxycycline Hyclate 50 MG TABS Take 1 tablet by mouth daily   Yes Historical Provider, MD   metroNIDAZOLE (METROGEL) 1 % gel Apply 1 application topically daily Apply topically daily.  PRN   Yes Historical Provider, MD   famotidine (PEPCID) 20 MG tablet Take 20 mg by mouth 2 times daily    Historical Provider, MD   Cholecalciferol (VITAMIN D) 50 MCG (2000 UT) CAPS capsule Take 1 capsule by mouth daily    Historical Provider, MD   BIOTIN PO Take 1 tablet by mouth daily    Historical Provider, MD   docusate sodium (COLACE) 100 MG capsule Take 1 capsule by mouth 2 times daily as needed for Constipation 12/14/21   Gudelia Backers,    rosuvastatin (CRESTOR) 5 MG tablet Take 5 mg by mouth daily    Historical Provider, MD   metFORMIN (GLUCOPHAGE-XR) 750 MG extended release tablet Take 750 mg by mouth daily (with breakfast)  10/7/21   Historical Provider, MD   Multiple Vitamins-Minerals (THERAPEUTIC MULTIVITAMIN-MINERALS) tablet Take 1 tablet by mouth daily    Historical Provider, MD   albuterol sulfate  (90 BASE) MCG/ACT inhaler Inhale 2 puffs into the lungs every 6 hours as needed for Wheezing    Historical Provider, MD Allergies:     Sulfa antibiotics    Social History:     Tobacco:    reports that she has quit smoking. Her smoking use included cigarettes. She has a 0.75 pack-year smoking history. She has never used smokeless tobacco.  Alcohol:      reports current alcohol use. Drug Use:  reports no history of drug use. Family History:     No family history on file. Review of Systems:     Positive and Negative as described in HPI. CONSTITUTIONAL:  Negative for fevers, chills, sweats, fatigue, and weight loss. HEENT: Glasses. Bilateral hearing aides. Negative for rhinorrhea, and throat pain. RESPIRATORY: Asthma. Negative for shortness of breath, cough, congestion, and wheezing. CARDIOVASCULAR: Hyperlipidemia. Negative for chest pain, blood clot, irregular heartbeat, and palpitations. GASTROINTESTINAL: GERD. Diverticulitis with ostomy creation left lower quadrant. Reversed April 2022. Intermittent diarrhea. Negative for nausea, vomiting, constipation, change in bowel habits, and abdominal pa in. GENITOURINARY: Frequency. Negative for difficulty of urination, burning with urination. INTEGUMENT: Negative for rash, skin lesions, and easy bruising. HEMATOLOGIC/LYMPHATIC:  Negative for swelling/edema. ALLERGIC/IMMUNOLOGIC: Negative for urticaria and itching. ENDOCRINE: Diabetes- managed by PCP Dr. Ramón Walls. Negative for increase in thirst, increase in urination, and heat or cold intolerance. MUSCULOSKELETAL: Arthritis. Negative swelling of joints. NEUROLOGICAL: Negative for headaches, dizziness, lightheadedness, numbness, and tingling extremities. BEHAVIOR/PSYCH: Negative for depression and anxiety. Physical Exam:   /68   Pulse 84   Temp 97.3 °F (36.3 °C) (Temporal)   Resp 18   Ht 5' 8\" (1.727 m)   Wt 209 lb 8 oz (95 kg)   SpO2 97%   BMI 31.85 kg/m²   No LMP recorded. Patient is postmenopausal.  No obstetric history on file. No results for input(s): POCGLU in the last 72 hours.     General Appearance:  Alert, well appearing, and in no acute distress. Mental status: Oriented to person, place, and time. Head: Normocephalic and atraumatic. Eye: Glasses. No icterus, redness, pupils equal and reactive, extraocular eye movements intact, and conjunctiva clear. Ear: Bilateral hearing aides. Nose: No drainage noted. Mouth: Mucous membranes moist.  Neck: Supple and no carotid bruits noted. Lungs: Bilateral equal air entry, clear to auscultation, no wheezing, rales or rhonchi, and normal effort. Cardiovascular: Normal rate, regular rhythm, no murmur, gallop, or rub. Abdomen: Obese. Right sided ventral hernia. Soft, nontender, nondistended, and active bowel sounds. Neurologic: Normal speech and cranial nerves II through XII grossly intact. Strength 5/5 bilaterally. Skin: Scattered scars on abdomen from prior surgeries. No gross lesions, rashes, bruising, or bleeding on exposed skin area. Extremities: Posterior tibial pulses 2+ bilaterally. No pedal edema. No calf tenderness with palpation. Psych: Normal affect. Investigations:      Laboratory Testing:  Recent Results (from the past 24 hour(s))   EKG 12 Lead    Collection Time: 23  8:54 AM   Result Value Ref Range    Ventricular Rate 78 BPM    Atrial Rate 78 BPM    P-R Interval 158 ms    QRS Duration 80 ms    Q-T Interval 408 ms    QTc Calculation (Bazett) 465 ms    P Axis 57 degrees    R Axis 14 degrees    T Axis 49 degrees       No results for input(s): HGB, HCT, WBC, MCV, PLATELET, NA, K, CL, CO2, BUN, CREATININE, GLUCOSE, INR, PROTIME, APTT, AST, ALT, LABALBU, HCG in the last 720 hours. No results for input(s): COVID19 in the last 720 hours. *Please note that labs listed above are the most recent lab values available in EPIC at the time of the visit and additional labs may have been drawn or resulted since that time. Imaging/Diagnostics:    No results found. EK2023. See Epic. Diagnosis:      1.  Incisional hernia, without obstruction or gangrene [K43.2]    Plans:     1.  HERNIA VENTRAL REPAIR LAPAROSCOPIC ROBOTIC XI      TAMIKO Garcia - CNP  3/8/2023  9:23 AM

## 2023-03-10 LAB
EKG ATRIAL RATE: 78 BPM
EKG P AXIS: 57 DEGREES
EKG P-R INTERVAL: 158 MS
EKG Q-T INTERVAL: 408 MS
EKG QRS DURATION: 80 MS
EKG QTC CALCULATION (BAZETT): 465 MS
EKG R AXIS: 14 DEGREES
EKG T AXIS: 49 DEGREES
EKG VENTRICULAR RATE: 78 BPM

## 2023-03-22 ENCOUNTER — ANESTHESIA EVENT (OUTPATIENT)
Dept: OPERATING ROOM | Age: 68
End: 2023-03-22
Payer: COMMERCIAL

## 2023-03-23 ENCOUNTER — ANESTHESIA (OUTPATIENT)
Dept: OPERATING ROOM | Age: 68
End: 2023-03-23
Payer: COMMERCIAL

## 2023-03-23 ENCOUNTER — HOSPITAL ENCOUNTER (INPATIENT)
Age: 68
LOS: 4 days | Discharge: HOME | DRG: 337 | End: 2023-03-27
Attending: SURGERY | Admitting: SURGERY
Payer: COMMERCIAL

## 2023-03-23 DIAGNOSIS — K43.2 INCISIONAL HERNIA, WITHOUT OBSTRUCTION OR GANGRENE: ICD-10-CM

## 2023-03-23 PROBLEM — Z98.890 HISTORY OF INCISIONAL HERNIA REPAIR: Status: ACTIVE | Noted: 2023-03-23

## 2023-03-23 PROBLEM — Z87.19 HISTORY OF INCISIONAL HERNIA REPAIR: Status: ACTIVE | Noted: 2023-03-23

## 2023-03-23 LAB
GLUCOSE BLD-MCNC: 129 MG/DL (ref 65–105)
GLUCOSE BLD-MCNC: 144 MG/DL (ref 65–105)

## 2023-03-23 PROCEDURE — 1200000000 HC SEMI PRIVATE

## 2023-03-23 PROCEDURE — 82947 ASSAY GLUCOSE BLOOD QUANT: CPT

## 2023-03-23 PROCEDURE — 2720000010 HC SURG SUPPLY STERILE: Performed by: SURGERY

## 2023-03-23 PROCEDURE — 3700000001 HC ADD 15 MINUTES (ANESTHESIA): Performed by: SURGERY

## 2023-03-23 PROCEDURE — 6360000002 HC RX W HCPCS: Performed by: SURGERY

## 2023-03-23 PROCEDURE — 2500000003 HC RX 250 WO HCPCS: Performed by: SURGERY

## 2023-03-23 PROCEDURE — 0DNE0ZZ RELEASE LARGE INTESTINE, OPEN APPROACH: ICD-10-PCS | Performed by: SURGERY

## 2023-03-23 PROCEDURE — 6360000002 HC RX W HCPCS: Performed by: NURSE ANESTHETIST, CERTIFIED REGISTERED

## 2023-03-23 PROCEDURE — 2580000003 HC RX 258: Performed by: NURSE ANESTHETIST, CERTIFIED REGISTERED

## 2023-03-23 PROCEDURE — 0WUF0JZ SUPPLEMENT ABDOMINAL WALL WITH SYNTHETIC SUBSTITUTE, OPEN APPROACH: ICD-10-PCS | Performed by: SURGERY

## 2023-03-23 PROCEDURE — 6370000000 HC RX 637 (ALT 250 FOR IP)

## 2023-03-23 PROCEDURE — C1781 MESH (IMPLANTABLE): HCPCS | Performed by: SURGERY

## 2023-03-23 PROCEDURE — 6360000002 HC RX W HCPCS

## 2023-03-23 PROCEDURE — A4216 STERILE WATER/SALINE, 10 ML: HCPCS

## 2023-03-23 PROCEDURE — 3600000013 HC SURGERY LEVEL 3 ADDTL 15MIN: Performed by: SURGERY

## 2023-03-23 PROCEDURE — 2709999900 HC NON-CHARGEABLE SUPPLY: Performed by: SURGERY

## 2023-03-23 PROCEDURE — 2580000003 HC RX 258

## 2023-03-23 PROCEDURE — 7100000000 HC PACU RECOVERY - FIRST 15 MIN: Performed by: SURGERY

## 2023-03-23 PROCEDURE — 88302 TISSUE EXAM BY PATHOLOGIST: CPT

## 2023-03-23 PROCEDURE — 3700000000 HC ANESTHESIA ATTENDED CARE: Performed by: SURGERY

## 2023-03-23 PROCEDURE — 2500000003 HC RX 250 WO HCPCS

## 2023-03-23 PROCEDURE — 2500000003 HC RX 250 WO HCPCS: Performed by: NURSE ANESTHETIST, CERTIFIED REGISTERED

## 2023-03-23 PROCEDURE — 6360000002 HC RX W HCPCS: Performed by: ANESTHESIOLOGY

## 2023-03-23 PROCEDURE — 7100000001 HC PACU RECOVERY - ADDTL 15 MIN: Performed by: SURGERY

## 2023-03-23 PROCEDURE — 2580000003 HC RX 258: Performed by: SURGERY

## 2023-03-23 PROCEDURE — 3600000003 HC SURGERY LEVEL 3 BASE: Performed by: SURGERY

## 2023-03-23 DEVICE — PATCH HERN XL W7.7XL9.7IN UNCOATED MFIL PROPYLENE OVL ABSRB: Type: IMPLANTABLE DEVICE | Site: ABDOMEN | Status: FUNCTIONAL

## 2023-03-23 RX ORDER — OXYCODONE HYDROCHLORIDE 5 MG/1
5 TABLET ORAL
Status: DISCONTINUED | OUTPATIENT
Start: 2023-03-23 | End: 2023-03-23 | Stop reason: HOSPADM

## 2023-03-23 RX ORDER — OXYCODONE HYDROCHLORIDE 5 MG/1
5 TABLET ORAL EVERY 4 HOURS PRN
Status: DISCONTINUED | OUTPATIENT
Start: 2023-03-23 | End: 2023-03-27

## 2023-03-23 RX ORDER — SODIUM CHLORIDE 0.9 % (FLUSH) 0.9 %
5-40 SYRINGE (ML) INJECTION PRN
Status: DISCONTINUED | OUTPATIENT
Start: 2023-03-23 | End: 2023-03-23 | Stop reason: HOSPADM

## 2023-03-23 RX ORDER — DEXTROSE, SODIUM CHLORIDE, AND POTASSIUM CHLORIDE 5; .9; .15 G/100ML; G/100ML; G/100ML
INJECTION INTRAVENOUS CONTINUOUS
Status: DISCONTINUED | OUTPATIENT
Start: 2023-03-23 | End: 2023-03-27

## 2023-03-23 RX ORDER — ONDANSETRON 4 MG/1
4 TABLET, ORALLY DISINTEGRATING ORAL EVERY 8 HOURS PRN
Status: DISCONTINUED | OUTPATIENT
Start: 2023-03-23 | End: 2023-03-27 | Stop reason: HOSPADM

## 2023-03-23 RX ORDER — SODIUM CHLORIDE, SODIUM LACTATE, POTASSIUM CHLORIDE, CALCIUM CHLORIDE 600; 310; 30; 20 MG/100ML; MG/100ML; MG/100ML; MG/100ML
INJECTION, SOLUTION INTRAVENOUS CONTINUOUS
Status: DISCONTINUED | OUTPATIENT
Start: 2023-03-23 | End: 2023-03-23

## 2023-03-23 RX ORDER — ROSUVASTATIN CALCIUM 10 MG/1
5 TABLET, COATED ORAL DAILY
Status: DISCONTINUED | OUTPATIENT
Start: 2023-03-23 | End: 2023-03-27 | Stop reason: HOSPADM

## 2023-03-23 RX ORDER — DIPHENHYDRAMINE HYDROCHLORIDE 50 MG/ML
12.5 INJECTION INTRAMUSCULAR; INTRAVENOUS
Status: DISCONTINUED | OUTPATIENT
Start: 2023-03-23 | End: 2023-03-23 | Stop reason: HOSPADM

## 2023-03-23 RX ORDER — LIDOCAINE HYDROCHLORIDE 10 MG/ML
1 INJECTION, SOLUTION EPIDURAL; INFILTRATION; INTRACAUDAL; PERINEURAL
Status: DISCONTINUED | OUTPATIENT
Start: 2023-03-24 | End: 2023-03-23 | Stop reason: HOSPADM

## 2023-03-23 RX ORDER — SODIUM CHLORIDE 9 MG/ML
INJECTION, SOLUTION INTRAVENOUS PRN
Status: DISCONTINUED | OUTPATIENT
Start: 2023-03-23 | End: 2023-03-23 | Stop reason: HOSPADM

## 2023-03-23 RX ORDER — ONDANSETRON 2 MG/ML
4 INJECTION INTRAMUSCULAR; INTRAVENOUS
Status: DISCONTINUED | OUTPATIENT
Start: 2023-03-23 | End: 2023-03-23 | Stop reason: HOSPADM

## 2023-03-23 RX ORDER — ALBUTEROL SULFATE 90 UG/1
2 AEROSOL, METERED RESPIRATORY (INHALATION) EVERY 6 HOURS PRN
Status: DISCONTINUED | OUTPATIENT
Start: 2023-03-23 | End: 2023-03-27 | Stop reason: HOSPADM

## 2023-03-23 RX ORDER — MORPHINE SULFATE 2 MG/ML
1 INJECTION, SOLUTION INTRAMUSCULAR; INTRAVENOUS
Status: DISCONTINUED | OUTPATIENT
Start: 2023-03-23 | End: 2023-03-27 | Stop reason: HOSPADM

## 2023-03-23 RX ORDER — PHENYLEPHRINE HCL IN 0.9% NACL 1 MG/10 ML
SYRINGE (ML) INTRAVENOUS PRN
Status: DISCONTINUED | OUTPATIENT
Start: 2023-03-23 | End: 2023-03-23 | Stop reason: SDUPTHER

## 2023-03-23 RX ORDER — ONDANSETRON 2 MG/ML
INJECTION INTRAMUSCULAR; INTRAVENOUS PRN
Status: DISCONTINUED | OUTPATIENT
Start: 2023-03-23 | End: 2023-03-23 | Stop reason: SDUPTHER

## 2023-03-23 RX ORDER — HYDROMORPHONE HYDROCHLORIDE 1 MG/ML
0.25 INJECTION, SOLUTION INTRAMUSCULAR; INTRAVENOUS; SUBCUTANEOUS EVERY 5 MIN PRN
Status: DISCONTINUED | OUTPATIENT
Start: 2023-03-23 | End: 2023-03-23 | Stop reason: HOSPADM

## 2023-03-23 RX ORDER — SODIUM CHLORIDE 9 MG/ML
INJECTION, SOLUTION INTRAVENOUS PRN
Status: DISCONTINUED | OUTPATIENT
Start: 2023-03-23 | End: 2023-03-27 | Stop reason: HOSPADM

## 2023-03-23 RX ORDER — IBUPROFEN 400 MG/1
400 TABLET ORAL EVERY 4 HOURS
Status: DISPENSED | OUTPATIENT
Start: 2023-03-23 | End: 2023-03-24

## 2023-03-23 RX ORDER — ROCURONIUM BROMIDE 10 MG/ML
INJECTION, SOLUTION INTRAVENOUS PRN
Status: DISCONTINUED | OUTPATIENT
Start: 2023-03-23 | End: 2023-03-23 | Stop reason: SDUPTHER

## 2023-03-23 RX ORDER — LIDOCAINE HYDROCHLORIDE 20 MG/ML
INJECTION, SOLUTION EPIDURAL; INFILTRATION; INTRACAUDAL; PERINEURAL PRN
Status: DISCONTINUED | OUTPATIENT
Start: 2023-03-23 | End: 2023-03-23 | Stop reason: SDUPTHER

## 2023-03-23 RX ORDER — SODIUM CHLORIDE 0.9 % (FLUSH) 0.9 %
5-40 SYRINGE (ML) INJECTION EVERY 12 HOURS SCHEDULED
Status: DISCONTINUED | OUTPATIENT
Start: 2023-03-23 | End: 2023-03-27 | Stop reason: HOSPADM

## 2023-03-23 RX ORDER — FENTANYL CITRATE 50 UG/ML
INJECTION, SOLUTION INTRAMUSCULAR; INTRAVENOUS PRN
Status: DISCONTINUED | OUTPATIENT
Start: 2023-03-23 | End: 2023-03-23 | Stop reason: SDUPTHER

## 2023-03-23 RX ORDER — DEXAMETHASONE SODIUM PHOSPHATE 10 MG/ML
INJECTION, SOLUTION INTRAMUSCULAR; INTRAVENOUS PRN
Status: DISCONTINUED | OUTPATIENT
Start: 2023-03-23 | End: 2023-03-23 | Stop reason: SDUPTHER

## 2023-03-23 RX ORDER — SODIUM CHLORIDE 9 MG/ML
INJECTION, SOLUTION INTRAVENOUS CONTINUOUS
Status: DISCONTINUED | OUTPATIENT
Start: 2023-03-23 | End: 2023-03-23

## 2023-03-23 RX ORDER — SODIUM CHLORIDE, SODIUM LACTATE, POTASSIUM CHLORIDE, CALCIUM CHLORIDE 600; 310; 30; 20 MG/100ML; MG/100ML; MG/100ML; MG/100ML
INJECTION, SOLUTION INTRAVENOUS CONTINUOUS PRN
Status: DISCONTINUED | OUTPATIENT
Start: 2023-03-23 | End: 2023-03-23 | Stop reason: SDUPTHER

## 2023-03-23 RX ORDER — MIDAZOLAM HYDROCHLORIDE 1 MG/ML
INJECTION INTRAMUSCULAR; INTRAVENOUS PRN
Status: DISCONTINUED | OUTPATIENT
Start: 2023-03-23 | End: 2023-03-23 | Stop reason: SDUPTHER

## 2023-03-23 RX ORDER — ACETAMINOPHEN 500 MG
1000 TABLET ORAL EVERY 8 HOURS SCHEDULED
Status: DISCONTINUED | OUTPATIENT
Start: 2023-03-23 | End: 2023-03-27 | Stop reason: HOSPADM

## 2023-03-23 RX ORDER — ONDANSETRON 2 MG/ML
4 INJECTION INTRAMUSCULAR; INTRAVENOUS EVERY 6 HOURS PRN
Status: DISCONTINUED | OUTPATIENT
Start: 2023-03-23 | End: 2023-03-27 | Stop reason: HOSPADM

## 2023-03-23 RX ORDER — SODIUM CHLORIDE 0.9 % (FLUSH) 0.9 %
5-40 SYRINGE (ML) INJECTION PRN
Status: DISCONTINUED | OUTPATIENT
Start: 2023-03-23 | End: 2023-03-27 | Stop reason: HOSPADM

## 2023-03-23 RX ORDER — HYDROMORPHONE HYDROCHLORIDE 1 MG/ML
0.5 INJECTION, SOLUTION INTRAMUSCULAR; INTRAVENOUS; SUBCUTANEOUS EVERY 5 MIN PRN
Status: DISCONTINUED | OUTPATIENT
Start: 2023-03-23 | End: 2023-03-23 | Stop reason: HOSPADM

## 2023-03-23 RX ORDER — BUPIVACAINE HYDROCHLORIDE AND EPINEPHRINE 5; 5 MG/ML; UG/ML
INJECTION, SOLUTION EPIDURAL; INTRACAUDAL; PERINEURAL PRN
Status: DISCONTINUED | OUTPATIENT
Start: 2023-03-23 | End: 2023-03-23 | Stop reason: ALTCHOICE

## 2023-03-23 RX ORDER — PROPOFOL 10 MG/ML
INJECTION, EMULSION INTRAVENOUS PRN
Status: DISCONTINUED | OUTPATIENT
Start: 2023-03-23 | End: 2023-03-23 | Stop reason: SDUPTHER

## 2023-03-23 RX ORDER — HYDROMORPHONE HCL 110MG/55ML
PATIENT CONTROLLED ANALGESIA SYRINGE INTRAVENOUS PRN
Status: DISCONTINUED | OUTPATIENT
Start: 2023-03-23 | End: 2023-03-23 | Stop reason: SDUPTHER

## 2023-03-23 RX ORDER — METFORMIN HYDROCHLORIDE 750 MG/1
750 TABLET, EXTENDED RELEASE ORAL
Status: DISCONTINUED | OUTPATIENT
Start: 2023-03-24 | End: 2023-03-27 | Stop reason: HOSPADM

## 2023-03-23 RX ORDER — SODIUM CHLORIDE 0.9 % (FLUSH) 0.9 %
5-40 SYRINGE (ML) INJECTION EVERY 12 HOURS SCHEDULED
Status: DISCONTINUED | OUTPATIENT
Start: 2023-03-23 | End: 2023-03-23 | Stop reason: HOSPADM

## 2023-03-23 RX ADMIN — Medication 150 MCG: at 10:13

## 2023-03-23 RX ADMIN — HYDROMORPHONE HYDROCHLORIDE 0.5 MG: 2 INJECTION, SOLUTION INTRAMUSCULAR; INTRAVENOUS; SUBCUTANEOUS at 09:09

## 2023-03-23 RX ADMIN — ROCURONIUM BROMIDE 10 MG: 10 INJECTION, SOLUTION INTRAVENOUS at 09:44

## 2023-03-23 RX ADMIN — Medication 100 MCG: at 09:50

## 2023-03-23 RX ADMIN — PROPOFOL 200 MG: 10 INJECTION, EMULSION INTRAVENOUS at 08:36

## 2023-03-23 RX ADMIN — Medication 100 MCG: at 09:47

## 2023-03-23 RX ADMIN — MIDAZOLAM 2 MG: 1 INJECTION INTRAMUSCULAR; INTRAVENOUS at 08:31

## 2023-03-23 RX ADMIN — OXYCODONE HYDROCHLORIDE 5 MG: 5 TABLET ORAL at 13:09

## 2023-03-23 RX ADMIN — MORPHINE SULFATE 1 MG: 2 INJECTION, SOLUTION INTRAMUSCULAR; INTRAVENOUS at 19:47

## 2023-03-23 RX ADMIN — HYDROMORPHONE HYDROCHLORIDE 0.25 MG: 1 INJECTION, SOLUTION INTRAMUSCULAR; INTRAVENOUS; SUBCUTANEOUS at 11:42

## 2023-03-23 RX ADMIN — ONDANSETRON 4 MG: 2 INJECTION INTRAMUSCULAR; INTRAVENOUS at 10:30

## 2023-03-23 RX ADMIN — ROCURONIUM BROMIDE 10 MG: 10 INJECTION, SOLUTION INTRAVENOUS at 10:07

## 2023-03-23 RX ADMIN — DEXAMETHASONE SODIUM PHOSPHATE 4 MG: 10 INJECTION INTRAMUSCULAR; INTRAVENOUS at 08:50

## 2023-03-23 RX ADMIN — HYDROMORPHONE HYDROCHLORIDE 0.5 MG: 2 INJECTION, SOLUTION INTRAMUSCULAR; INTRAVENOUS; SUBCUTANEOUS at 10:57

## 2023-03-23 RX ADMIN — HYDROMORPHONE HYDROCHLORIDE 0.5 MG: 1 INJECTION, SOLUTION INTRAMUSCULAR; INTRAVENOUS; SUBCUTANEOUS at 12:04

## 2023-03-23 RX ADMIN — LIDOCAINE HYDROCHLORIDE 80 MG: 20 INJECTION, SOLUTION EPIDURAL; INFILTRATION; INTRACAUDAL; PERINEURAL at 08:36

## 2023-03-23 RX ADMIN — SODIUM CHLORIDE, POTASSIUM CHLORIDE, SODIUM LACTATE AND CALCIUM CHLORIDE: 600; 310; 30; 20 INJECTION, SOLUTION INTRAVENOUS at 10:59

## 2023-03-23 RX ADMIN — POTASSIUM CHLORIDE, DEXTROSE MONOHYDRATE AND SODIUM CHLORIDE: 150; 5; 900 INJECTION, SOLUTION INTRAVENOUS at 13:09

## 2023-03-23 RX ADMIN — IBUPROFEN 400 MG: 400 TABLET ORAL at 13:09

## 2023-03-23 RX ADMIN — SODIUM CHLORIDE, PRESERVATIVE FREE 20 MG: 5 INJECTION INTRAVENOUS at 13:09

## 2023-03-23 RX ADMIN — Medication 100 MCG: at 09:29

## 2023-03-23 RX ADMIN — POTASSIUM CHLORIDE, DEXTROSE MONOHYDRATE AND SODIUM CHLORIDE: 150; 5; 900 INJECTION, SOLUTION INTRAVENOUS at 19:53

## 2023-03-23 RX ADMIN — FENTANYL CITRATE 50 MCG: 50 INJECTION INTRAMUSCULAR; INTRAVENOUS at 08:39

## 2023-03-23 RX ADMIN — HYDROMORPHONE HYDROCHLORIDE 0.5 MG: 1 INJECTION, SOLUTION INTRAMUSCULAR; INTRAVENOUS; SUBCUTANEOUS at 11:22

## 2023-03-23 RX ADMIN — HYDROMORPHONE HYDROCHLORIDE 0.5 MG: 1 INJECTION, SOLUTION INTRAMUSCULAR; INTRAVENOUS; SUBCUTANEOUS at 11:33

## 2023-03-23 RX ADMIN — ROCURONIUM BROMIDE 50 MG: 10 INJECTION, SOLUTION INTRAVENOUS at 08:36

## 2023-03-23 RX ADMIN — SODIUM CHLORIDE, PRESERVATIVE FREE 20 MG: 5 INJECTION INTRAVENOUS at 19:47

## 2023-03-23 RX ADMIN — SODIUM CHLORIDE, POTASSIUM CHLORIDE, SODIUM LACTATE AND CALCIUM CHLORIDE: 600; 310; 30; 20 INJECTION, SOLUTION INTRAVENOUS at 08:32

## 2023-03-23 RX ADMIN — SUGAMMADEX 200 MG: 100 INJECTION, SOLUTION INTRAVENOUS at 10:53

## 2023-03-23 RX ADMIN — VANCOMYCIN HYDROCHLORIDE 1500 MG: 5 INJECTION, POWDER, LYOPHILIZED, FOR SOLUTION INTRAVENOUS at 08:43

## 2023-03-23 RX ADMIN — FENTANYL CITRATE 50 MCG: 50 INJECTION INTRAMUSCULAR; INTRAVENOUS at 08:36

## 2023-03-23 RX ADMIN — ROCURONIUM BROMIDE 10 MG: 10 INJECTION, SOLUTION INTRAVENOUS at 09:46

## 2023-03-23 RX ADMIN — Medication 100 MCG: at 09:34

## 2023-03-23 ASSESSMENT — ENCOUNTER SYMPTOMS: SHORTNESS OF BREATH: 0

## 2023-03-23 ASSESSMENT — PAIN SCALES - GENERAL
PAINLEVEL_OUTOF10: 7
PAINLEVEL_OUTOF10: 6
PAINLEVEL_OUTOF10: 7
PAINLEVEL_OUTOF10: 10
PAINLEVEL_OUTOF10: 7
PAINLEVEL_OUTOF10: 5
PAINLEVEL_OUTOF10: 7

## 2023-03-23 ASSESSMENT — PAIN DESCRIPTION - DESCRIPTORS
DESCRIPTORS: ACHING;SORE
DESCRIPTORS: BURNING;ACHING

## 2023-03-23 ASSESSMENT — PAIN - FUNCTIONAL ASSESSMENT: PAIN_FUNCTIONAL_ASSESSMENT: 0-10

## 2023-03-23 ASSESSMENT — PAIN DESCRIPTION - LOCATION
LOCATION: ABDOMEN
LOCATION: ABDOMEN

## 2023-03-23 ASSESSMENT — PAIN DESCRIPTION - PAIN TYPE: TYPE: SURGICAL PAIN

## 2023-03-23 ASSESSMENT — PAIN DESCRIPTION - ORIENTATION
ORIENTATION: MID
ORIENTATION: MID

## 2023-03-23 NOTE — H&P
RESECTION SIGMOID performed by Judith Elise MD at [address] [place] [address] Dr                Medications Prior to Admission:      Home Medications           Prior to Admission medications    Medication Sig Start Date End Date Taking? Authorizing Provider   Doxycycline Hyclate 50 MG TABS Take 1 tablet by mouth daily     Yes Historical Provider, MD   metroNIDAZOLE (METROGEL) 1 % gel Apply 1 application topically daily Apply topically daily. PRN     Yes Historical Provider, MD   famotidine (PEPCID) 20 MG tablet Take 20 mg by mouth 2 times daily       Historical Provider, MD   Cholecalciferol (VITAMIN D) 50 MCG (2000 UT) CAPS capsule Take 1 capsule by mouth daily       Historical Provider, MD   BIOTIN PO Take 1 tablet by mouth daily       Historical Provider, MD   docusate sodium (COLACE) 100 MG capsule Take 1 capsule by mouth 2 times daily as needed for Constipation 12/14/21     Josue hCan DO   rosuvastatin (CRESTOR) 5 MG tablet Take 5 mg by mouth daily       Historical Provider, MD   metFORMIN (GLUCOPHAGE-XR) 750 MG extended release tablet Take 750 mg by mouth daily (with breakfast)  10/7/21     Historical Provider, MD   Multiple Vitamins-Minerals (THERAPEUTIC MULTIVITAMIN-MINERALS) tablet Take 1 tablet by mouth daily       Historical Provider, MD   albuterol sulfate  (90 BASE) MCG/ACT inhaler Inhale 2 puffs into the lungs every 6 hours as needed for Wheezing       Historical Provider, MD            Allergies:      Sulfa antibiotics     Social History:      Tobacco:    reports that she has quit smoking. Her smoking use included cigarettes. She has a 0.75 pack-year smoking history. She has never used smokeless tobacco.  Alcohol:      reports current alcohol use. Drug Use:  reports no history of drug use. Family History:      Family History   No family history on file. Review of Systems:      Positive and Negative as described in HPI.      CONSTITUTIONAL:  Negative for fevers, chills, sweats,

## 2023-03-23 NOTE — ANESTHESIA POSTPROCEDURE EVALUATION
Department of Anesthesiology  Postprocedure Note    Patient: Nagi Jerry  MRN: [id number]  YOB: 1955  Date of evaluation: 3/23/2023      Procedure Summary     Date: 03/23/23 Room / Location: [address] [place] [address] / [facility] - INPATIENT    Anesthesia Start: 7316 Anesthesia Stop: 9369    Procedure: DIAGNOSTIC LAPAROSCOPY, OPEN INCISIONAL HERNIA REPAIR WITH MESH, EXTENSIVE LYSIS OF ADHESIONS. (Abdomen) Diagnosis:       Incisional hernia, without obstruction or gangrene      (Incisional hernia, without obstruction or gangrene [K43.2])    Surgeons: Dee Mejias MD Responsible Provider: Deon Hurt MD    Anesthesia Type: general ASA Status: 2          Anesthesia Type: No value filed.     Marshall Phase I: Marshall Score: 9    Marshall Phase II:        Anesthesia Post Evaluation    Complications: no

## 2023-03-23 NOTE — RT PROTOCOL NOTE
breathing using Per Protocol order mode. 4-6 - enter or revise RT Bronchodilator order(s) to two equivalent RT bronchodilator orders with one order with BID Frequency and one order with Frequency of every 4 hours PRN wheezing or increased work of breathing using Per Protocol order mode. 7-10 - enter or revise RT Bronchodilator order(s) to two equivalent RT bronchodilator orders with one order with TID Frequency and one order with Frequency of every 4 hours PRN wheezing or increased work of breathing using Per Protocol order mode. 11-13 - enter or revise RT Bronchodilator order(s) to one equivalent RT bronchodilator order with QID Frequency and an Albuterol order with Frequency of every 4 hours PRN wheezing or increased work of breathing using Per Protocol order mode. Greater than 13 - enter or revise RT Bronchodilator order(s) to one equivalent RT bronchodilator order with every 4 hours Frequency and an Albuterol order with Frequency of every 2 hours PRN wheezing or increased work of breathing using Per Protocol order mode. RT to enter RT Home Evaluation for COPD & MDI Assessment order using Per Protocol order mode.     Electronically signed by Treva Rosario RCP on 3/23/2023 at 1:18 PM

## 2023-03-23 NOTE — ANESTHESIA PRE PROCEDURE
results found for: PHART, PO2ART, YSL9TBR, MCW2RTU, BEART, R7NQIONG     Type & Screen (If Applicable):  No results found for: LABABO, LABRH    Drug/Infectious Status (If Applicable):  No results found for: HIV, HEPCAB    COVID-19 Screening (If Applicable): No results found for: COVID19        Anesthesia Evaluation    Airway: Mallampati: I  TM distance: >3 FB   Neck ROM: full  Mouth opening: > = 3 FB   Dental:          Pulmonary:   (+) asthma:     (-) shortness of breath                           Cardiovascular:                      Neuro/Psych:               GI/Hepatic/Renal:             Endo/Other:    (+) Diabetes, . Abdominal:             Vascular:           Other Findings:           Anesthesia Plan      general     ASA 2                                   Evan Ball MD   3/23/2023

## 2023-03-23 NOTE — OP NOTE
Operative Note      Patient: Linda Leggett  YOB: 1955  MRN: [id number]    Date of Procedure: 3/23/2023    Pre-Op Diagnosis: Incisional hernia, without obstruction or gangrene [K43.2]    Post-Op Diagnosis:  Large incisional hernia, extensive intra-abdominal adhesions       Procedure(s):  DIAGNOSTIC LAPAROSCOPY, OPEN INCISIONAL HERNIA REPAIR WITH MESH, EXTENSIVE LYSIS OF ADHESIONS. Entheses hernia defect was 12 cm x 10 cm)    Surgeon(s): Aracely العراقي MD    Assistant:   Resident: Georgie Miles DO    Anesthesia: General    Estimated Blood Loss (mL): less than 50     Complications: None    Specimens:   ID Type Source Tests Collected by Time Destination   A : [facility] Tissue Abdomen SURGICAL PATHOLOGY (Canceled) Aracely العراقي MD 3/23/2023 5554        Implants:  Implant Name Type Inv. Item Serial No.  Lot No. LRB No. Used Action   PATCH ANASTASIYA XL W7.7XL9.7IN UNCOATED MFIL PROPYLENE OVL ABSRB - [id number]  PATCH ANASTASIYA XL W7.7XL9.7IN UNCOATED MFIL PROPYLENE OVL ABSRB  BARD DAVOL-WD [id number] N/A 1 Implanted         Drains:   Closed/Suction Drain Right RLQ Bulb (Active)   Site Description Clean, dry & intact 03/23/23 1300   Dressing Status Clean, dry & intact 03/23/23 1300   Drainage Appearance Bloody 03/23/23 1300   Drain Status To bulb suction 03/23/23 1300   Output (ml) 20 ml 03/23/23 1230       NG/OG/NJ/NE Tube Nasogastric 16 fr Right nostril (Active)   Surrounding Skin Clean, dry & intact 03/23/23 1300   Securement device Tape 03/23/23 1300   Status Suction-low intermittent 03/23/23 1300   NG/OG/NJ/NE External Measurement (cm) 62 cm 03/23/23 1300       Findings: Large incisional hernia, extensive intra-abdominal adhesions. After diagnostic laparoscopy, we decided to convert the procedure into open    Detailed Description of Procedure:   Patient was brought to the operating room and was placed in the supine position.   After induction of general endotracheal anesthesia, patient's abdomen was prepped

## 2023-03-24 LAB
ABSOLUTE EOS #: <0.03 K/UL (ref 0–0.44)
ABSOLUTE IMMATURE GRANULOCYTE: 0.02 K/UL (ref 0–0.3)
ABSOLUTE LYMPH #: 1.27 K/UL (ref 1.1–3.7)
ABSOLUTE MONO #: 1.01 K/UL (ref 0.1–1.2)
ANION GAP SERPL CALCULATED.3IONS-SCNC: 5 MMOL/L (ref 9–17)
BASOPHILS # BLD: 0 % (ref 0–2)
BASOPHILS ABSOLUTE: <0.03 K/UL (ref 0–0.2)
BUN SERPL-MCNC: 9 MG/DL (ref 8–23)
BUN/CREAT BLD: 15 (ref 9–20)
CALCIUM SERPL-MCNC: 8.2 MG/DL (ref 8.6–10.4)
CHLORIDE SERPL-SCNC: 110 MMOL/L (ref 98–107)
CO2 SERPL-SCNC: 26 MMOL/L (ref 20–31)
CREAT SERPL-MCNC: 0.59 MG/DL (ref 0.5–0.9)
EOSINOPHILS RELATIVE PERCENT: 0 % (ref 1–4)
GFR SERPL CREATININE-BSD FRML MDRD: >60 ML/MIN/1.73M2
GLUCOSE BLD-MCNC: 131 MG/DL (ref 65–105)
GLUCOSE BLD-MCNC: 134 MG/DL (ref 65–105)
GLUCOSE BLD-MCNC: 136 MG/DL (ref 65–105)
GLUCOSE BLD-MCNC: 153 MG/DL (ref 65–105)
GLUCOSE SERPL-MCNC: 173 MG/DL (ref 70–99)
HCT VFR BLD AUTO: 37.2 % (ref 36.3–47.1)
HGB BLD-MCNC: 11.7 G/DL (ref 11.9–15.1)
IMMATURE GRANULOCYTES: 0 %
LYMPHOCYTES # BLD: 15 % (ref 24–43)
MAGNESIUM SERPL-MCNC: 2.2 MG/DL (ref 1.6–2.6)
MCH RBC QN AUTO: 29.4 PG (ref 25.2–33.5)
MCHC RBC AUTO-ENTMCNC: 31.5 G/DL (ref 28.4–34.8)
MCV RBC AUTO: 93.5 FL (ref 82.6–102.9)
MONOCYTES # BLD: 12 % (ref 3–12)
NRBC AUTOMATED: 0 PER 100 WBC
PDW BLD-RTO: 14.1 % (ref 11.8–14.4)
PHOSPHATE SERPL-MCNC: 2.4 MG/DL (ref 2.6–4.5)
PLATELET # BLD AUTO: 240 K/UL (ref 138–453)
PMV BLD AUTO: 9.7 FL (ref 8.1–13.5)
POTASSIUM SERPL-SCNC: 4.4 MMOL/L (ref 3.7–5.3)
RBC # BLD: 3.98 M/UL (ref 3.95–5.11)
SEG NEUTROPHILS: 73 % (ref 36–65)
SEGMENTED NEUTROPHILS ABSOLUTE COUNT: 6.25 K/UL (ref 1.5–8.1)
SODIUM SERPL-SCNC: 141 MMOL/L (ref 135–144)
SURGICAL PATHOLOGY REPORT: NORMAL
WBC # BLD AUTO: 8.6 K/UL (ref 3.5–11.3)

## 2023-03-24 PROCEDURE — 6360000002 HC RX W HCPCS

## 2023-03-24 PROCEDURE — 2500000003 HC RX 250 WO HCPCS

## 2023-03-24 PROCEDURE — 85025 COMPLETE CBC W/AUTO DIFF WBC: CPT

## 2023-03-24 PROCEDURE — 6370000000 HC RX 637 (ALT 250 FOR IP)

## 2023-03-24 PROCEDURE — 1200000000 HC SEMI PRIVATE

## 2023-03-24 PROCEDURE — 83735 ASSAY OF MAGNESIUM: CPT

## 2023-03-24 PROCEDURE — 36415 COLL VENOUS BLD VENIPUNCTURE: CPT

## 2023-03-24 PROCEDURE — A4216 STERILE WATER/SALINE, 10 ML: HCPCS

## 2023-03-24 PROCEDURE — 82947 ASSAY GLUCOSE BLOOD QUANT: CPT

## 2023-03-24 PROCEDURE — 2580000003 HC RX 258

## 2023-03-24 PROCEDURE — 84100 ASSAY OF PHOSPHORUS: CPT

## 2023-03-24 PROCEDURE — 80048 BASIC METABOLIC PNL TOTAL CA: CPT

## 2023-03-24 RX ORDER — ENOXAPARIN SODIUM 100 MG/ML
40 INJECTION SUBCUTANEOUS DAILY
Status: DISCONTINUED | OUTPATIENT
Start: 2023-03-24 | End: 2023-03-27 | Stop reason: HOSPADM

## 2023-03-24 RX ORDER — BISACODYL 10 MG
10 SUPPOSITORY, RECTAL RECTAL ONCE
Status: COMPLETED | OUTPATIENT
Start: 2023-03-24 | End: 2023-03-24

## 2023-03-24 RX ADMIN — Medication 10 MG: at 03:58

## 2023-03-24 RX ADMIN — POTASSIUM CHLORIDE, DEXTROSE MONOHYDRATE AND SODIUM CHLORIDE: 150; 5; 900 INJECTION, SOLUTION INTRAVENOUS at 11:53

## 2023-03-24 RX ADMIN — BISACODYL 10 MG: 10 SUPPOSITORY RECTAL at 08:39

## 2023-03-24 RX ADMIN — ENOXAPARIN SODIUM 40 MG: 100 INJECTION SUBCUTANEOUS at 08:38

## 2023-03-24 RX ADMIN — ACETAMINOPHEN 1000 MG: 500 TABLET ORAL at 21:28

## 2023-03-24 RX ADMIN — POTASSIUM CHLORIDE, DEXTROSE MONOHYDRATE AND SODIUM CHLORIDE: 150; 5; 900 INJECTION, SOLUTION INTRAVENOUS at 19:59

## 2023-03-24 RX ADMIN — POTASSIUM CHLORIDE, DEXTROSE MONOHYDRATE AND SODIUM CHLORIDE: 150; 5; 900 INJECTION, SOLUTION INTRAVENOUS at 03:50

## 2023-03-24 RX ADMIN — SODIUM CHLORIDE, PRESERVATIVE FREE 20 MG: 5 INJECTION INTRAVENOUS at 19:59

## 2023-03-24 RX ADMIN — METFORMIN HYDROCHLORIDE 750 MG: 750 TABLET, EXTENDED RELEASE ORAL at 08:39

## 2023-03-24 RX ADMIN — Medication 10 MG: at 20:04

## 2023-03-24 RX ADMIN — IBUPROFEN 400 MG: 400 TABLET ORAL at 08:39

## 2023-03-24 RX ADMIN — ROSUVASTATIN CALCIUM 5 MG: 10 TABLET, FILM COATED ORAL at 08:39

## 2023-03-24 RX ADMIN — ACETAMINOPHEN 1000 MG: 500 TABLET ORAL at 14:22

## 2023-03-24 RX ADMIN — SODIUM CHLORIDE, PRESERVATIVE FREE 20 MG: 5 INJECTION INTRAVENOUS at 08:39

## 2023-03-24 ASSESSMENT — PAIN DESCRIPTION - DESCRIPTORS
DESCRIPTORS: TENDER
DESCRIPTORS: TENDER
DESCRIPTORS: SORE;TENDER

## 2023-03-24 ASSESSMENT — PAIN DESCRIPTION - LOCATION
LOCATION: ABDOMEN

## 2023-03-24 ASSESSMENT — PAIN DESCRIPTION - ORIENTATION: ORIENTATION: MID

## 2023-03-24 ASSESSMENT — PAIN DESCRIPTION - PAIN TYPE: TYPE: SURGICAL PAIN

## 2023-03-24 ASSESSMENT — PAIN - FUNCTIONAL ASSESSMENT: PAIN_FUNCTIONAL_ASSESSMENT: ACTIVITIES ARE NOT PREVENTED

## 2023-03-24 ASSESSMENT — PAIN DESCRIPTION - FREQUENCY: FREQUENCY: INTERMITTENT

## 2023-03-24 ASSESSMENT — PAIN SCALES - GENERAL
PAINLEVEL_OUTOF10: 7
PAINLEVEL_OUTOF10: 6
PAINLEVEL_OUTOF10: 5

## 2023-03-24 ASSESSMENT — PAIN DESCRIPTION - ONSET: ONSET: ON-GOING

## 2023-03-24 NOTE — ACP (ADVANCE CARE PLANNING)
regarding differences between Advance Directives and portable DNR orders.     Length of ACP Conversation in minutes:  5    Conversation Outcomes:  ACP discussion completed    Follow-up plan:    [] Schedule follow-up conversation to continue planning  [] Referred individual to Provider for additional questions/concerns   [] Advised patient/agent/surrogate to review completed ACP document and update if needed with changes in condition, patient preferences or care setting    [] This note routed to one or more involved healthcare providers

## 2023-03-24 NOTE — CARE COORDINATION
Case Management Initial Discharge Plan  Shannan Barber,             Met with:patient to discuss discharge plans. Information verified: address, contacts, phone number, , insurance Yes  PCP: Marbin Tolbert MD  Date of last visit:     Insurance Provider: Tracy Eastman, [facility] [place]    Discharge Planning    Living Arrangements:  Spouse/Significant Other   Support Systems:  Spouse/Significant Other, Family Members    Home has 1 stories  2 stairs to climb to get into front door, 0 stairs to climb to reach second floor  Location of bedroom/bathroom in home  - main floor    Patient able to perform ADL's:Independent    Current Services (outpatient & in home) none  DME equipment: none  DME provider: N/A    [facility]: [facility]    Potential Assistance Needed:  N/A    Patient agreeable to home care: No  [place] of choice provided:  n/a    Prior SNF/Rehab Placement and Facility: No  Agreeable to SNF/Rehab: No  [place] of choice provided: n/a   Evaluation: n/a    Expected Discharge date:     Patient expects to be discharged to:   home  Follow Up Appointment: Best Day/ Time: Monday AM    Transportation provider:  or brother  Transportation arrangements needed for discharge: No    Readmission Risk              Risk of Unplanned Readmission:  5             Does patient have a readmission risk score greater than 14?: No  If yes, follow-up appointment must be made within 7 days of discharge. Goal of Care:       Discharge Plan: Met with patient at bedside. Lives with , independent and drives. POD #1 open inc hernia repair with mesh, extensive lysis of adhesions. NG tube removed this AM.  Has had [facility] in the past and declines need for home care when discharged.   Post op appointment with Dr. Rod Rosas is 3-31 at 9:20am.              Electronically signed by Shashi Jasso RN on 3/24/23 at 8:19 AM EDT

## 2023-03-25 LAB
ABSOLUTE EOS #: 0.08 K/UL (ref 0–0.44)
ABSOLUTE IMMATURE GRANULOCYTE: 0.01 K/UL (ref 0–0.3)
ABSOLUTE LYMPH #: 1.32 K/UL (ref 1.1–3.7)
ABSOLUTE MONO #: 0.6 K/UL (ref 0.1–1.2)
ANION GAP SERPL CALCULATED.3IONS-SCNC: 8 MMOL/L (ref 9–17)
BASOPHILS # BLD: 0 % (ref 0–2)
BASOPHILS ABSOLUTE: <0.03 K/UL (ref 0–0.2)
BUN SERPL-MCNC: 5 MG/DL (ref 8–23)
BUN/CREAT BLD: 10 (ref 9–20)
CALCIUM SERPL-MCNC: 8.1 MG/DL (ref 8.6–10.4)
CHLORIDE SERPL-SCNC: 110 MMOL/L (ref 98–107)
CO2 SERPL-SCNC: 20 MMOL/L (ref 20–31)
CREAT SERPL-MCNC: 0.52 MG/DL (ref 0.5–0.9)
EOSINOPHILS RELATIVE PERCENT: 1 % (ref 1–4)
GFR SERPL CREATININE-BSD FRML MDRD: >60 ML/MIN/1.73M2
GLUCOSE BLD-MCNC: 127 MG/DL (ref 65–105)
GLUCOSE BLD-MCNC: 86 MG/DL (ref 65–105)
GLUCOSE SERPL-MCNC: 138 MG/DL (ref 70–99)
HCT VFR BLD AUTO: 36 % (ref 36.3–47.1)
HGB BLD-MCNC: 11.1 G/DL (ref 11.9–15.1)
IMMATURE GRANULOCYTES: 0 %
LYMPHOCYTES # BLD: 23 % (ref 24–43)
MAGNESIUM SERPL-MCNC: 2.1 MG/DL (ref 1.6–2.6)
MCH RBC QN AUTO: 29.4 PG (ref 25.2–33.5)
MCHC RBC AUTO-ENTMCNC: 30.8 G/DL (ref 28.4–34.8)
MCV RBC AUTO: 95.2 FL (ref 82.6–102.9)
MONOCYTES # BLD: 11 % (ref 3–12)
NRBC AUTOMATED: 0 PER 100 WBC
PDW BLD-RTO: 14.1 % (ref 11.8–14.4)
PHOSPHATE SERPL-MCNC: 1.8 MG/DL (ref 2.6–4.5)
PLATELET # BLD AUTO: 213 K/UL (ref 138–453)
PMV BLD AUTO: 9.8 FL (ref 8.1–13.5)
POTASSIUM SERPL-SCNC: 4.3 MMOL/L (ref 3.7–5.3)
RBC # BLD: 3.78 M/UL (ref 3.95–5.11)
SEG NEUTROPHILS: 64 % (ref 36–65)
SEGMENTED NEUTROPHILS ABSOLUTE COUNT: 3.66 K/UL (ref 1.5–8.1)
SODIUM SERPL-SCNC: 138 MMOL/L (ref 135–144)
WBC # BLD AUTO: 5.7 K/UL (ref 3.5–11.3)

## 2023-03-25 PROCEDURE — 2500000003 HC RX 250 WO HCPCS: Performed by: SURGERY

## 2023-03-25 PROCEDURE — 82947 ASSAY GLUCOSE BLOOD QUANT: CPT

## 2023-03-25 PROCEDURE — 1200000000 HC SEMI PRIVATE

## 2023-03-25 PROCEDURE — 2580000003 HC RX 258: Performed by: SURGERY

## 2023-03-25 PROCEDURE — A4216 STERILE WATER/SALINE, 10 ML: HCPCS

## 2023-03-25 PROCEDURE — 6370000000 HC RX 637 (ALT 250 FOR IP)

## 2023-03-25 PROCEDURE — 85025 COMPLETE CBC W/AUTO DIFF WBC: CPT

## 2023-03-25 PROCEDURE — 6360000002 HC RX W HCPCS

## 2023-03-25 PROCEDURE — 80048 BASIC METABOLIC PNL TOTAL CA: CPT

## 2023-03-25 PROCEDURE — 84100 ASSAY OF PHOSPHORUS: CPT

## 2023-03-25 PROCEDURE — 36415 COLL VENOUS BLD VENIPUNCTURE: CPT

## 2023-03-25 PROCEDURE — 83735 ASSAY OF MAGNESIUM: CPT

## 2023-03-25 PROCEDURE — 2580000003 HC RX 258

## 2023-03-25 PROCEDURE — 2500000003 HC RX 250 WO HCPCS

## 2023-03-25 RX ADMIN — POTASSIUM CHLORIDE, DEXTROSE MONOHYDRATE AND SODIUM CHLORIDE: 150; 5; 900 INJECTION, SOLUTION INTRAVENOUS at 18:17

## 2023-03-25 RX ADMIN — ENOXAPARIN SODIUM 40 MG: 100 INJECTION SUBCUTANEOUS at 10:06

## 2023-03-25 RX ADMIN — ROSUVASTATIN CALCIUM 5 MG: 10 TABLET, FILM COATED ORAL at 10:06

## 2023-03-25 RX ADMIN — SODIUM CHLORIDE, PRESERVATIVE FREE 10 ML: 5 INJECTION INTRAVENOUS at 10:30

## 2023-03-25 RX ADMIN — ACETAMINOPHEN 1000 MG: 500 TABLET ORAL at 06:08

## 2023-03-25 RX ADMIN — SODIUM PHOSPHATE, MONOBASIC, MONOHYDRATE AND SODIUM PHOSPHATE, DIBASIC, ANHYDROUS 15.18 MMOL: 276; 142 INJECTION, SOLUTION INTRAVENOUS at 11:38

## 2023-03-25 RX ADMIN — POTASSIUM CHLORIDE, DEXTROSE MONOHYDRATE AND SODIUM CHLORIDE: 150; 5; 900 INJECTION, SOLUTION INTRAVENOUS at 03:41

## 2023-03-25 RX ADMIN — SODIUM CHLORIDE, PRESERVATIVE FREE 20 MG: 5 INJECTION INTRAVENOUS at 21:24

## 2023-03-25 RX ADMIN — ACETAMINOPHEN 1000 MG: 500 TABLET ORAL at 21:24

## 2023-03-25 RX ADMIN — ACETAMINOPHEN 1000 MG: 500 TABLET ORAL at 14:57

## 2023-03-25 RX ADMIN — SODIUM CHLORIDE: 9 INJECTION, SOLUTION INTRAVENOUS at 11:36

## 2023-03-25 RX ADMIN — SODIUM CHLORIDE, PRESERVATIVE FREE 20 MG: 5 INJECTION INTRAVENOUS at 10:05

## 2023-03-25 ASSESSMENT — PAIN DESCRIPTION - ORIENTATION
ORIENTATION: MID
ORIENTATION: MID

## 2023-03-25 ASSESSMENT — PAIN DESCRIPTION - PAIN TYPE
TYPE: SURGICAL PAIN
TYPE: SURGICAL PAIN

## 2023-03-25 ASSESSMENT — PAIN DESCRIPTION - LOCATION
LOCATION: ABDOMEN

## 2023-03-25 ASSESSMENT — PAIN DESCRIPTION - DESCRIPTORS
DESCRIPTORS: SORE
DESCRIPTORS: SORE

## 2023-03-25 ASSESSMENT — PAIN SCALES - GENERAL
PAINLEVEL_OUTOF10: 5
PAINLEVEL_OUTOF10: 0
PAINLEVEL_OUTOF10: 2
PAINLEVEL_OUTOF10: 0

## 2023-03-25 ASSESSMENT — PAIN DESCRIPTION - ONSET
ONSET: ON-GOING
ONSET: ON-GOING

## 2023-03-25 ASSESSMENT — PAIN DESCRIPTION - FREQUENCY
FREQUENCY: INTERMITTENT
FREQUENCY: INTERMITTENT

## 2023-03-25 ASSESSMENT — PAIN - FUNCTIONAL ASSESSMENT
PAIN_FUNCTIONAL_ASSESSMENT: PREVENTS OR INTERFERES SOME ACTIVE ACTIVITIES AND ADLS
PAIN_FUNCTIONAL_ASSESSMENT: PREVENTS OR INTERFERES SOME ACTIVE ACTIVITIES AND ADLS

## 2023-03-26 LAB
ABSOLUTE EOS #: 0.18 K/UL (ref 0–0.44)
ABSOLUTE IMMATURE GRANULOCYTE: 0.01 K/UL (ref 0–0.3)
ABSOLUTE LYMPH #: 1.38 K/UL (ref 1.1–3.7)
ABSOLUTE MONO #: 0.49 K/UL (ref 0.1–1.2)
ANION GAP SERPL CALCULATED.3IONS-SCNC: 9 MMOL/L (ref 9–17)
BASOPHILS # BLD: 0 % (ref 0–2)
BASOPHILS ABSOLUTE: <0.03 K/UL (ref 0–0.2)
BUN SERPL-MCNC: 4 MG/DL (ref 8–23)
BUN/CREAT BLD: 7 (ref 9–20)
CALCIUM SERPL-MCNC: 8.3 MG/DL (ref 8.6–10.4)
CHLORIDE SERPL-SCNC: 108 MMOL/L (ref 98–107)
CO2 SERPL-SCNC: 22 MMOL/L (ref 20–31)
CREAT SERPL-MCNC: 0.54 MG/DL (ref 0.5–0.9)
EOSINOPHILS RELATIVE PERCENT: 4 % (ref 1–4)
GFR SERPL CREATININE-BSD FRML MDRD: >60 ML/MIN/1.73M2
GLUCOSE SERPL-MCNC: 136 MG/DL (ref 70–99)
HCT VFR BLD AUTO: 37.5 % (ref 36.3–47.1)
HGB BLD-MCNC: 11.6 G/DL (ref 11.9–15.1)
IMMATURE GRANULOCYTES: 0 %
LYMPHOCYTES # BLD: 29 % (ref 24–43)
MAGNESIUM SERPL-MCNC: 1.8 MG/DL (ref 1.6–2.6)
MCH RBC QN AUTO: 29.1 PG (ref 25.2–33.5)
MCHC RBC AUTO-ENTMCNC: 30.9 G/DL (ref 28.4–34.8)
MCV RBC AUTO: 94.2 FL (ref 82.6–102.9)
MONOCYTES # BLD: 10 % (ref 3–12)
NRBC AUTOMATED: 0 PER 100 WBC
PDW BLD-RTO: 13.8 % (ref 11.8–14.4)
PHOSPHATE SERPL-MCNC: 2.4 MG/DL (ref 2.6–4.5)
PLATELET # BLD AUTO: 234 K/UL (ref 138–453)
PMV BLD AUTO: 9.7 FL (ref 8.1–13.5)
POTASSIUM SERPL-SCNC: 4.5 MMOL/L (ref 3.7–5.3)
RBC # BLD: 3.98 M/UL (ref 3.95–5.11)
SEG NEUTROPHILS: 57 % (ref 36–65)
SEGMENTED NEUTROPHILS ABSOLUTE COUNT: 2.75 K/UL (ref 1.5–8.1)
SODIUM SERPL-SCNC: 139 MMOL/L (ref 135–144)
WBC # BLD AUTO: 4.8 K/UL (ref 3.5–11.3)

## 2023-03-26 PROCEDURE — 84100 ASSAY OF PHOSPHORUS: CPT

## 2023-03-26 PROCEDURE — 2580000003 HC RX 258

## 2023-03-26 PROCEDURE — 6370000000 HC RX 637 (ALT 250 FOR IP)

## 2023-03-26 PROCEDURE — 36415 COLL VENOUS BLD VENIPUNCTURE: CPT

## 2023-03-26 PROCEDURE — A4216 STERILE WATER/SALINE, 10 ML: HCPCS

## 2023-03-26 PROCEDURE — 85025 COMPLETE CBC W/AUTO DIFF WBC: CPT

## 2023-03-26 PROCEDURE — 6370000000 HC RX 637 (ALT 250 FOR IP): Performed by: SURGERY

## 2023-03-26 PROCEDURE — 83735 ASSAY OF MAGNESIUM: CPT

## 2023-03-26 PROCEDURE — 1200000000 HC SEMI PRIVATE

## 2023-03-26 PROCEDURE — 80048 BASIC METABOLIC PNL TOTAL CA: CPT

## 2023-03-26 PROCEDURE — 2500000003 HC RX 250 WO HCPCS

## 2023-03-26 PROCEDURE — 6360000002 HC RX W HCPCS

## 2023-03-26 RX ORDER — BISACODYL 10 MG
10 SUPPOSITORY, RECTAL RECTAL DAILY PRN
Status: DISCONTINUED | OUTPATIENT
Start: 2023-03-26 | End: 2023-03-27 | Stop reason: HOSPADM

## 2023-03-26 RX ORDER — FAMOTIDINE 20 MG/1
20 TABLET, FILM COATED ORAL 2 TIMES DAILY
Status: DISCONTINUED | OUTPATIENT
Start: 2023-03-26 | End: 2023-03-27 | Stop reason: HOSPADM

## 2023-03-26 RX ADMIN — METFORMIN HYDROCHLORIDE 750 MG: 750 TABLET, EXTENDED RELEASE ORAL at 08:23

## 2023-03-26 RX ADMIN — POTASSIUM CHLORIDE, DEXTROSE MONOHYDRATE AND SODIUM CHLORIDE: 150; 5; 900 INJECTION, SOLUTION INTRAVENOUS at 02:00

## 2023-03-26 RX ADMIN — ROSUVASTATIN CALCIUM 5 MG: 10 TABLET, FILM COATED ORAL at 08:23

## 2023-03-26 RX ADMIN — ACETAMINOPHEN 1000 MG: 500 TABLET ORAL at 14:02

## 2023-03-26 RX ADMIN — FAMOTIDINE 20 MG: 20 TABLET, FILM COATED ORAL at 21:54

## 2023-03-26 RX ADMIN — ENOXAPARIN SODIUM 40 MG: 100 INJECTION SUBCUTANEOUS at 08:26

## 2023-03-26 RX ADMIN — SODIUM CHLORIDE, PRESERVATIVE FREE 20 MG: 5 INJECTION INTRAVENOUS at 08:23

## 2023-03-26 RX ADMIN — ACETAMINOPHEN 1000 MG: 500 TABLET ORAL at 06:02

## 2023-03-26 RX ADMIN — ACETAMINOPHEN 1000 MG: 500 TABLET ORAL at 21:54

## 2023-03-26 ASSESSMENT — PAIN DESCRIPTION - ORIENTATION
ORIENTATION: MID
ORIENTATION: MID

## 2023-03-26 ASSESSMENT — PAIN DESCRIPTION - PAIN TYPE
TYPE: SURGICAL PAIN
TYPE: SURGICAL PAIN

## 2023-03-26 ASSESSMENT — PAIN SCALES - GENERAL
PAINLEVEL_OUTOF10: 0
PAINLEVEL_OUTOF10: 5
PAINLEVEL_OUTOF10: 2
PAINLEVEL_OUTOF10: 0

## 2023-03-26 ASSESSMENT — PAIN DESCRIPTION - FREQUENCY
FREQUENCY: INTERMITTENT
FREQUENCY: INTERMITTENT

## 2023-03-26 ASSESSMENT — PAIN DESCRIPTION - DESCRIPTORS
DESCRIPTORS: SORE
DESCRIPTORS: SORE

## 2023-03-26 ASSESSMENT — PAIN DESCRIPTION - ONSET
ONSET: ON-GOING
ONSET: ON-GOING

## 2023-03-27 VITALS
BODY MASS INDEX: 31.67 KG/M2 | OXYGEN SATURATION: 96 % | HEART RATE: 65 BPM | TEMPERATURE: 97.2 F | DIASTOLIC BLOOD PRESSURE: 78 MMHG | WEIGHT: 209 LBS | HEIGHT: 68 IN | RESPIRATION RATE: 16 BRPM | SYSTOLIC BLOOD PRESSURE: 140 MMHG

## 2023-03-27 PROCEDURE — 6370000000 HC RX 637 (ALT 250 FOR IP): Performed by: SURGERY

## 2023-03-27 PROCEDURE — 6370000000 HC RX 637 (ALT 250 FOR IP)

## 2023-03-27 RX ORDER — ACETAMINOPHEN 500 MG
500 TABLET ORAL 4 TIMES DAILY PRN
Qty: 360 TABLET | Refills: 0 | Status: SHIPPED | OUTPATIENT
Start: 2023-03-27

## 2023-03-27 RX ORDER — ONDANSETRON 4 MG/1
4 TABLET, ORALLY DISINTEGRATING ORAL EVERY 8 HOURS PRN
Qty: 20 TABLET | Refills: 0 | Status: SHIPPED | OUTPATIENT
Start: 2023-03-27

## 2023-03-27 RX ADMIN — ACETAMINOPHEN 1000 MG: 500 TABLET ORAL at 05:46

## 2023-03-27 RX ADMIN — FAMOTIDINE 20 MG: 20 TABLET, FILM COATED ORAL at 07:30

## 2023-03-27 RX ADMIN — ROSUVASTATIN CALCIUM 5 MG: 10 TABLET, FILM COATED ORAL at 07:29

## 2023-03-27 ASSESSMENT — PAIN SCALES - GENERAL: PAINLEVEL_OUTOF10: 0

## 2023-03-27 NOTE — PLAN OF CARE
Problem: Chronic Conditions and Co-morbidities  Goal: Patient's chronic conditions and co-morbidity symptoms are monitored and maintained or improved  3/24/2023 0944 by Aditi Lowe RN  Outcome: Progressing     Problem: Discharge Planning  Goal: Discharge to home or other facility with appropriate resources  3/24/2023 0944 by Aditi Lowe RN  Outcome: Progressing     Problem: Pain  Goal: Verbalizes/displays adequate comfort level or baseline comfort level  3/24/2023 0944 by Aditi Lowe RN  Outcome: Progressing     Problem: Safety - Adult  Goal: Free from fall injury  3/24/2023 0944 by Aditi Lowe RN  Outcome: Progressing     Problem: Anxiety  Goal: Will report anxiety at manageable levels  Description: INTERVENTIONS:  1. Administer medication as ordered  2. Teach and rehearse alternative coping skills  3. Provide emotional support with 1:1 interaction with staff  3/24/2023 0944 by Aditi Lowe RN  Outcome: Progressing     Problem: Behavior  Goal: Pt/Family maintain appropriate behavior and adhere to behavioral management agreement, if implemented  Description: INTERVENTIONS:  1. Assess patient/family's coping skills and  non-compliant behavior (including use of illegal substances)  2. Notify security of behavior or suspected illegal substances which indicate the need for search of the family and/or belongings  3. Encourage verbalization of thoughts and concerns in a socially appropriate manner  4. Utilize positive, consistent limit setting strategies supporting safety of patient, staff and others  5. Encourage participation in the decision making process about the behavioral management agreement  6. If a visitor's behavior poses a threat to safety call refer to organization policy.   7. Initiate consult with , Psychosocial CNS, Spiritual Care as appropriate  3/24/2023 0944 by Aditi Lowe RN  Outcome: Progressing
Problem: Chronic Conditions and Co-morbidities  Goal: Patient's chronic conditions and co-morbidity symptoms are monitored and maintained or improved  3/25/2023 1001 by Adelaida Benjamin RN  Outcome: Progressing     Problem: Discharge Planning  Goal: Discharge to home or other facility with appropriate resources  3/25/2023 1001 by Adelaida Benjamin RN  Outcome: Progressing     Problem: Pain  Goal: Verbalizes/displays adequate comfort level or baseline comfort level  3/25/2023 1001 by Adelaida Benjamin RN  Outcome: Progressing     Problem: Safety - Adult  Goal: Free from fall injury  3/25/2023 1001 by Adelaida Benjamin RN  Outcome: Progressing     Problem: Anxiety  Goal: Will report anxiety at manageable levels  Description: INTERVENTIONS:  1. Administer medication as ordered  2. Teach and rehearse alternative coping skills  3. Provide emotional support with 1:1 interaction with staff  3/25/2023 1001 by Adelaida Benjamin RN  Outcome: Progressing     Problem: Behavior  Goal: Pt/Family maintain appropriate behavior and adhere to behavioral management agreement, if implemented  Description: INTERVENTIONS:  1. Assess patient/family's coping skills and  non-compliant behavior (including use of illegal substances)  2. Notify security of behavior or suspected illegal substances which indicate the need for search of the family and/or belongings  3. Encourage verbalization of thoughts and concerns in a socially appropriate manner  4. Utilize positive, consistent limit setting strategies supporting safety of patient, staff and others  5. Encourage participation in the decision making process about the behavioral management agreement  6. If a visitor's behavior poses a threat to safety call refer to organization policy.   7. Initiate consult with , Psychosocial CNS, Spiritual Care as appropriate  3/25/2023 1001 by Adelaida Benjamin RN  Outcome: Progressing     Problem: Skin/Tissue Integrity - Adult  Goal: Skin integrity
Problem: Chronic Conditions and Co-morbidities  Goal: Patient's chronic conditions and co-morbidity symptoms are monitored and maintained or improved  3/27/2023 0744 by Jayla Porter RN  Outcome: Adequate for Discharge  Flowsheets (Taken 3/27/2023 0730)  Care Plan - Patient's Chronic Conditions and Co-Morbidity Symptoms are Monitored and Maintained or Improved: Monitor and assess patient's chronic conditions and comorbid symptoms for stability, deterioration, or improvement  3/26/2023 2207 by Lloyd Rodriguez RN  Outcome: Progressing  Flowsheets (Taken 3/26/2023 2154)  Care Plan - Patient's Chronic Conditions and Co-Morbidity Symptoms are Monitored and Maintained or Improved:   Monitor and assess patient's chronic conditions and comorbid symptoms for stability, deterioration, or improvement   Collaborate with multidisciplinary team to address chronic and comorbid conditions and prevent exacerbation or deterioration   Update acute care plan with appropriate goals if chronic or comorbid symptoms are exacerbated and prevent overall improvement and discharge     Problem: Discharge Planning  Goal: Discharge to home or other facility with appropriate resources  3/27/2023 0744 by Jayla Porter RN  Outcome: Adequate for Discharge  Flowsheets (Taken 3/27/2023 0730)  Discharge to home or other facility with appropriate resources:   Identify barriers to discharge with patient and caregiver   Arrange for needed discharge resources and transportation as appropriate   Identify discharge learning needs (meds, wound care, etc)   Refer to discharge planning if patient needs post-hospital services based on physician order or complex needs related to functional status, cognitive ability or social support system  3/26/2023 2207 by Lloyd Rodriguez RN  Outcome: Progressing  Flowsheets (Taken 3/26/2023 2154)  Discharge to home or other facility with appropriate resources:   Identify barriers to discharge with patient and
Problem: Chronic Conditions and Co-morbidities  Goal: Patient's chronic conditions and co-morbidity symptoms are monitored and maintained or improved  Outcome: Progressing     Problem: Discharge Planning  Goal: Discharge to home or other facility with appropriate resources  Outcome: Progressing     Problem: Gastrointestinal - Adult  Goal: Minimal or absence of nausea and vomiting  Outcome: Progressing
Problem: Chronic Conditions and Co-morbidities  Goal: Patient's chronic conditions and co-morbidity symptoms are monitored and maintained or improved  Outcome: Progressing  Flowsheets (Taken 3/23/2023 1300)  Care Plan - Patient's Chronic Conditions and Co-Morbidity Symptoms are Monitored and Maintained or Improved:   Monitor and assess patient's chronic conditions and comorbid symptoms for stability, deterioration, or improvement   Collaborate with multidisciplinary team to address chronic and comorbid conditions and prevent exacerbation or deterioration   Update acute care plan with appropriate goals if chronic or comorbid symptoms are exacerbated and prevent overall improvement and discharge     Problem: Discharge Planning  Goal: Discharge to home or other facility with appropriate resources  Outcome: Progressing  Flowsheets  Taken 3/23/2023 1300  Discharge to home or other facility with appropriate resources:   Identify barriers to discharge with patient and caregiver   Arrange for needed discharge resources and transportation as appropriate   Identify discharge learning needs (meds, wound care, etc)  Taken 3/23/2023 1259  Discharge to home or other facility with appropriate resources:   Identify barriers to discharge with patient and caregiver   Identify discharge learning needs (meds, wound care, etc)   Refer to discharge planning if patient needs post-hospital services based on physician order or complex needs related to functional status, cognitive ability or social support system   Arrange for needed discharge resources and transportation as appropriate
Problem: Safety - Adult  Goal: Free from fall injury  3/24/2023 0210 by Graciela Diggs RN  Outcome: Progressing
THANH Phan  Outcome: Progressing     Problem: Behavior  Goal: Pt/Family maintain appropriate behavior and adhere to behavioral management agreement, if implemented  Description: INTERVENTIONS:  1. Assess patient/family's coping skills and  non-compliant behavior (including use of illegal substances)  2. Notify security of behavior or suspected illegal substances which indicate the need for search of the family and/or belongings  3. Encourage verbalization of thoughts and concerns in a socially appropriate manner  4. Utilize positive, consistent limit setting strategies supporting safety of patient, staff and others  5. Encourage participation in the decision making process about the behavioral management agreement  6. If a visitor's behavior poses a threat to safety call refer to organization policy.   7. Initiate consult with , Psychosocial CNS, Spiritual Care as appropriate  3/25/2023 2133 by Roberta Brantley RN  Outcome: Progressing     Problem: Skin/Tissue Integrity - Adult  Goal: Skin integrity remains intact  3/25/2023 2133 by Roberta Brantley RN  Outcome: Progressing  Flowsheets (Taken 3/25/2023 2125)  Skin Integrity Remains Intact:   Monitor for areas of redness and/or skin breakdown   Assess vascular access sites hourly     Problem: Skin/Tissue Integrity - Adult  Goal: Incisions, wounds, or drain sites healing without S/S of infection  3/25/2023 2133 by Roberta Brantley RN  Outcome: Progressing  Flowsheets (Taken 3/25/2023 2125)  Incisions, Wounds, or Drain Sites Healing Without Sign and Symptoms of Infection:   TWICE DAILY: Assess and document skin integrity   TWICE DAILY: Assess and document dressing/incision, wound bed, drain sites and surrounding tissue     Problem: Gastrointestinal - Adult  Goal: Minimal or absence of nausea and vomiting  3/25/2023 2133 by Roberta Brantley RN  Outcome: Progressing  Flowsheets (Taken 3/25/2023 2125)  Minimal or absence of nausea and
behavioral management agreement, if implemented  Description: INTERVENTIONS:  1. Assess patient/family's coping skills and  non-compliant behavior (including use of illegal substances)  2. Notify security of behavior or suspected illegal substances which indicate the need for search of the family and/or belongings  3. Encourage verbalization of thoughts and concerns in a socially appropriate manner  4. Utilize positive, consistent limit setting strategies supporting safety of patient, staff and others  5. Encourage participation in the decision making process about the behavioral management agreement  6. If a visitor's behavior poses a threat to safety call refer to organization policy.   7. Initiate consult with , Psychosocial CNS, Spiritual Care as appropriate  Outcome: Progressing     Problem: Skin/Tissue Integrity - Adult  Goal: Skin integrity remains intact  Outcome: Progressing  Flowsheets (Taken 3/26/2023 2154)  Skin Integrity Remains Intact: Monitor for areas of redness and/or skin breakdown     Problem: Skin/Tissue Integrity - Adult  Goal: Incisions, wounds, or drain sites healing without S/S of infection  Outcome: Progressing  Flowsheets (Taken 3/26/2023 2154)  Incisions, Wounds, or Drain Sites Healing Without Sign and Symptoms of Infection:   TWICE DAILY: Assess and document skin integrity   TWICE DAILY: Assess and document dressing/incision, wound bed, drain sites and surrounding tissue     Problem: Gastrointestinal - Adult  Goal: Minimal or absence of nausea and vomiting  3/26/2023 2207 by Susana Herrera RN  Outcome: Progressing  Flowsheets (Taken 3/26/2023 2154)  Minimal or absence of nausea and vomiting: Advance diet as tolerated, if ordered     Problem: Gastrointestinal - Adult  Goal: Maintains or returns to baseline bowel function  Outcome: Progressing  Flowsheets (Taken 3/26/2023 2154)  Maintains or returns to baseline bowel function:   Assess bowel function   Encourage oral
ensure adequate hydration   Maintain NPO status until nausea and vomiting are resolved   Administer ordered antiemetic medications as needed   Provide nonpharmacologic comfort measures as appropriate   Advance diet as tolerated, if ordered   Nutrition consult to assist patient with adequate nutrition and appropriate food choices     Problem: Gastrointestinal - Adult  Goal: Maintains or returns to baseline bowel function  Outcome: Progressing  Flowsheets (Taken 3/24/2023 2005)  Maintains or returns to baseline bowel function:   Assess bowel function   Encourage oral fluids to ensure adequate hydration   Administer IV fluids as ordered to ensure adequate hydration   Administer ordered medications as needed   Encourage mobilization and activity   Nutrition consult to assist patient with appropriate food choices     Problem: Gastrointestinal - Adult  Goal: Maintains adequate nutritional intake  Outcome: Progressing  Flowsheets (Taken 3/24/2023 2005)  Maintains adequate nutritional intake:   Monitor percentage of each meal consumed   Identify factors contributing to decreased intake, treat as appropriate   Monitor intake and output, weight and lab values

## 2023-03-27 NOTE — PROGRESS NOTES
General Surgery:  Daily Progress Note          POD # 4 s/p incisional hernia repair with mesh placement          PATIENT NAME: Trey Busch     TODAY'S DATE: 3/27/2023, 6:08 AM    SUBJECTIVE:     Pt seen and examined at bedside. No acute events overnight. Afebrile. States that her pain is overall controlled. She was up in chair yesterday. She reports flatulence and BMs. Denies nausea or vomiting. She tolerated regular diet yesterday. OBJECTIVE:   VITALS:  /78   Pulse 77   Temp 97.5 °F (36.4 °C) (Oral)   Resp 16   Ht 5' 8\" (1.727 m)   Wt 209 lb (94.8 kg)   SpO2 97%   BMI 31.78 kg/m²      INTAKE/OUTPUT:      Intake/Output Summary (Last 24 hours) at 3/27/2023 1472  Last data filed at 3/27/2023 0548  Gross per 24 hour   Intake 428.15 ml   Output 530 ml   Net -101.85 ml         PHYSICAL EXAM:  General Appearance: awake, alert, oriented, in no acute distress  HEENT:  Normocephalic, atraumatic, mucus membranes moist   Heart: Heart regular rate and rhythm  Lungs: Increased work of breathing. IS 1000-1200mL  Abdomen: nontender to palpation, DELIO drain with 30mL, removed this am   Incision: staple line can be open to air, drian site with fluff and tape  Extremities: No cyanosis, pitting edema, rashes noted. Skin: Skin color, texture, turgor normal. No rashes or lesions.       Data:  CBC:   Lab Results   Component Value Date/Time    WBC 4.8 03/26/2023 06:04 AM    RBC 3.98 03/26/2023 06:04 AM    HGB 11.6 03/26/2023 06:04 AM    HCT 37.5 03/26/2023 06:04 AM    MCV 94.2 03/26/2023 06:04 AM    MCH 29.1 03/26/2023 06:04 AM    MCHC 30.9 03/26/2023 06:04 AM    RDW 13.8 03/26/2023 06:04 AM     03/26/2023 06:04 AM    MPV 9.7 03/26/2023 06:04 AM     BMP:    Lab Results   Component Value Date/Time     03/26/2023 06:04 AM    K 4.5 03/26/2023 06:04 AM     03/26/2023 06:04 AM    CO2 22 03/26/2023 06:04 AM    BUN 4 03/26/2023 06:04 AM    LABALBU 4.2 03/08/2023 09:17 AM    CREATININE 0.54 03/26/2023
Patient discharged via wheelchair to home with all her belongings in stable condition.  Patient understood and signed AVS.
Pt admitted to room. Oriented to room, call light and bed mechanics. Side rails up x2. Call light within reach. Orders reviewed.
[facility] NOTE    Room # 2009/2009-02   Name: Omar Welch              Reason for visit: Routine    I visited the patient. Admit Date & Time: 3/23/2023  6:37 AM    Assessment:  Omar Welch is a 78 y.o. female. Upon entering the room patient states about their medical condition, states struggles with their medical situation. States worries, fears frustrations. Patient states well , treated well. Patient states good family support, shares about spiritual life, Taoist background, shares Taoist beliefs. Patient shares about outside interests. Intervention:   provided a ministry presence, listening and prayer. Outcome:  Patient open to visit. Plan:  Chaplains will remain available to offer spiritual and emotional support as needed. Electronically signed by Martina Walls. Chaplain [place], on 3/25/2023 at 4:02 PM.  Jason      03/25/23 1557   Encounter Summary   Service Provided For: Patient   Referral/Consult From: [facility] Family members   Last Encounter  03/25/23   Complexity of Encounter Moderate   Begin Time 0256   End Time  0310   Total Time Calculated 14 min   Encounter    Type Initial Screen/Assessment   Assessment/Intervention/Outcome   Assessment Calm; Hopeful   Intervention Active listening;Discussed belief system/Taoist practices/missy;Discussed illness injury and its impact; Explored/Affirmed feelings, thoughts, concerns;Prayer (assurance of)/[place];Sustaining Presence/Ministry of presence   Outcome Engaged in conversation;Expressed feelings, needs, and concerns;Expressed Gratitude;Receptive
Surgical Progress Note    POD: 4    Chief complaint: The patient complains of some right-sided gas discomfort. She has been passing flatus. She tolerated answered to a full liquid diet yesterday. Patient doing fairly well    Vitals:    03/25/23 1905   BP: (!) 148/89   Pulse: 71   Resp: 18   Temp: 97.7 °F (36.5 °C)   SpO2: 98%        Review of Systems - History obtained from chart review and the patient    Exam: General Appearance: alert and oriented to person, place and time, well-developed and well-nourished, in no acute distress  Skin: warm and dry, no rash or erythema  Head: normocephalic and atraumatic  Eyes: pupils equal, round, and reactive to light, extraocular eye movements intact, conjunctivae normal  ENT: hearing grossly normal bilaterally  Neck: neck supple and non tender without mass, no thyromegaly or thyroid nodules, no cervical lymphadenopathy   Pulmonary/Chest: clear to auscultation bilaterally- no wheezes, rales or rhonchi, normal air movement, no respiratory distress  Cardiovascular: normal rate, normal S1 and S2, no gallops, intact distal pulses, and no carotid bruits  Abdomen: Soft, appropriately tender, surgical incision healing well at this point without signs of erythema or infection, DELIO with serosanguineous drainage  Extremities: no cyanosis and no clubbing  Musculoskeletal: normal range of motion, no joint swelling, deformity or tenderness  Neurologic: no cranial nerve deficit and speech normal    I/O last 3 completed shifts:   In: 2922.5 [I.V.:2922.5]  Out: 1267 [Urine:1350; Drains:60]    Hemoglobin   Date Value Ref Range Status   03/25/2023 11.1 (L) 11.9 - 15.1 g/dL Final     Hematocrit   Date Value Ref Range Status   03/25/2023 36.0 (L) 36.3 - 47.1 % Final     WBC   Date Value Ref Range Status   03/25/2023 5.7 3.5 - 11.3 k/uL Final     Sodium   Date Value Ref Range Status   03/25/2023 138 135 - 144 mmol/L Final     Potassium   Date Value Ref Range Status   03/25/2023 4.3 3.7 - 5.3
GFRAA >60 05/18/2022 05:43 AM    LABGLOM >60 03/25/2023 05:20 AM    GLUCOSE 138 03/25/2023 05:20 AM     Magnesium:    Lab Results   Component Value Date/Time    MG 2.1 03/25/2023 05:20 AM     Phosphorus:    Lab Results   Component Value Date/Time    PHOS 1.8 03/25/2023 05:20 AM         ASSESSMENT:  Active Hospital Problems    Diagnosis Date Noted    History of incisional hernia repair [S37.134, Z87.19] 03/23/2023       78 y.o. female with visional ventral hernia  - POD# 2 s/p incisional hernia repair with mesh placement.       Plan:  Diet: advance to CLD  Analgesia:  MMPT  GI prophylaxis: Pepcid  Bowel regimen:  suppository prn  DVT prophylaxis:  Lovenox  Activity:  Continue to encourage ambulation/activity w/ PT/OT  Continue to encourage incentive spirometry   Wound care: ABDs, replace as needed  DC planning: Pending to home    Electronically signed by Olinda Candelaria DO  on 3/25/2023 at 6:26 AM
K 4.4 03/08/2023 09:17 AM     03/08/2023 09:17 AM    CO2 28 03/08/2023 09:17 AM    BUN 18 03/08/2023 09:17 AM    LABALBU 4.2 03/08/2023 09:17 AM    CREATININE 0.75 03/08/2023 09:17 AM    CALCIUM 9.2 03/08/2023 09:17 AM    GFRAA >60 05/18/2022 05:43 AM    LABGLOM >60 03/08/2023 09:17 AM    GLUCOSE 101 03/08/2023 09:17 AM     Magnesium:    Lab Results   Component Value Date/Time    MG 1.8 05/18/2022 05:43 AM     Phosphorus:  No results found for: PHOS      ASSESSMENT:  Active Hospital Problems    Diagnosis Date Noted    History of incisional hernia repair [Q48.124, Z87.19] 03/23/2023       78 y.o. female with visional ventral hernia  - POD# 1 s/p incisional hernia repair with mesh placement. Plan:  Diet: NPO, with ice chips  Continue NG tube today. Analgesia:  MMPT  GI prophylaxis: Pepcid  Bowel regimen:  suppository today  DVT prophylaxis:  Lovenox  Activity:  Continue to encourage ambulation/activity w/ PT/OT  Continue to encourage incentive spirometry   Wound care: Change per general surgery on 3/25  DC planning: Pending to home    Electronically signed by Nelly Higgins DO  on 3/24/2023 at 6:27 AM   Attending Physician Statement  I have discussed the case, including pertinent history and exam findings with the resident. I agree with the assessment, plan and orders as documented by the resident. Remove NGT.  Ambulate and IS  Possible clear liquids later today

## 2023-03-30 NOTE — DISCHARGE SUMMARY
Surgery Discharge Summary     Patient Identification  Tomasa Aragon is a 78 y.o. female. :  1955  Admit Date:  3/23/2023    Discharge date:   3/27/2023  9:04 AM                                   Disposition: home    Discharge Diagnoses:   Patient Active Problem List   Diagnosis    Incisional hernia    Intra-abdominal abscess ([facility] [address]    Diverticulitis of intestine with abscess    Mild malnutrition ([facility])    S/P colectomy    Diverticular disease    Severe malnutrition ([facility] [address]    History of colostomy reversal    History of incisional hernia repair       Condition on discharge: Good    Surgery: Open repair of incisional hernia with mesh placement    Patient Instructions: Activity: no heavy lifting, pushing, pulling for 6 weeks, no driving for 2 weeks or while on analgesics  Diet: As tolerated  Follow-up with Dr. Alexsander Avendaño in 2 weeks. See pre-printed instructions in chart and given to patient upon discharge.     Discharge Medications:        Medication List        START taking these medications      acetaminophen 500 MG tablet  Commonly known as: TYLENOL  Take 1 tablet by mouth 4 times daily as needed for Pain     ondansetron 4 MG disintegrating tablet  Commonly known as: ZOFRAN-ODT  Take 1 tablet by mouth every 8 hours as needed for Nausea or Vomiting            CONTINUE taking these medications      albuterol sulfate  (90 Base) MCG/ACT inhaler  Commonly known as: PROVENTIL;VENTOLIN;PROAIR     BIOTIN PO     docusate sodium 100 MG capsule  Commonly known as: Colace  Take 1 capsule by mouth 2 times daily as needed for Constipation     Doxycycline Hyclate 50 MG Tabs     famotidine 20 MG tablet  Commonly known as: PEPCID     metFORMIN 750 MG extended release tablet  Commonly known as: GLUCOPHAGE-XR     metroNIDAZOLE 1 % gel  Commonly known as: METROGEL     rosuvastatin 5 MG tablet  Commonly known as: CRESTOR     therapeutic multivitamin-minerals tablet     vitamin D 50 MCG ( [place]) Caps capsule               Where

## (undated) DEVICE — STAPLER INT L75MM CUT LN L73MM STPL LN L77MM BLU B FRM 8

## (undated) DEVICE — Device

## (undated) DEVICE — BLADELESS OBTURATOR: Brand: WECK VISTA

## (undated) DEVICE — BLANKET WRM W29.9XL79.1IN UP BODY FORC AIR MISTRAL-AIR

## (undated) DEVICE — TIP COVER ACCESSORY

## (undated) DEVICE — ST. ANNE'S MULTI PORT PACK: Brand: MEDLINE INDUSTRIES, INC.

## (undated) DEVICE — SUTURE PROL SZ 4-0 L30IN NONABSORBABLE BLU SH L26MM 1/2 CIR 8831H

## (undated) DEVICE — 40580 - THE PINK PAD - ADVANCED TRENDELENBURG POSITIONING KIT: Brand: 40580 - THE PINK PAD - ADVANCED TRENDELENBURG POSITIONING KIT

## (undated) DEVICE — STAPLER INT DIA29MM CLS STPL H1.5-2.2MM OPN LEG L5.2MM 26

## (undated) DEVICE — BINDER ABD UNISX 3 PNL E PREM CNTCT CLSR L XL 46INX62INX9IN

## (undated) DEVICE — SUTURE PDS II SZ 0 L36IN ABSRB VLT L36MM CT-1 1/2 CIR Z346H

## (undated) DEVICE — SEALER ENDOSCP NANO COAT OPN DIV CRV L JAW LIGASURE IMPACT

## (undated) DEVICE — ARM DRAPE

## (undated) DEVICE — SOLUTION IV IRRIG POUR BRL 0.9% SODIUM CHL 2F7124

## (undated) DEVICE — SUTURE PROL SZ 1 L30IN NONABSORBABLE BLU L36MM CT-1 1/2 CIR 8425H

## (undated) DEVICE — SUTURE VCRL SZ 4-0 L27IN ABSRB UD L19MM PS-2 3/8 CIR PRIM J426H

## (undated) DEVICE — GLOVE SURG SZ 75 CRM LTX FREE POLYISOPRENE POLYMER BEAD ANTI

## (undated) DEVICE — TUBING, SUCTION, 1/4" X 12', STRAIGHT: Brand: MEDLINE

## (undated) DEVICE — SUTURE PROL SZ 2-0 L30IN NONABSORBABLE BLU L26MM SH 1/2 CIR 8833H

## (undated) DEVICE — WOUND RETRACTOR AND PROTECTOR: Brand: ALEXIS O WOUND PROTECTOR-RETRACTOR

## (undated) DEVICE — YANKAUER,POOLE TIP,STERILE,50/CS: Brand: MEDLINE

## (undated) DEVICE — SUTURE SZ 0 27IN 5/8 CIR UR-6  TAPER PT VIOLET ABSRB VICRYL J603H

## (undated) DEVICE — SPONGE DRN W4XL4IN RAYON/POLYESTER 6 PLY NONWOVEN PRECUT 2 PER PK

## (undated) DEVICE — SUTURE VCRL SZ 3-0 L27IN ABSRB UD L26MM SH 1/2 CIR J416H

## (undated) DEVICE — SUTURE NONABSORBABLE MONOFILAMENT 3-0 PS-1 18 IN BLK ETHILON 1663H

## (undated) DEVICE — SUTURE PDS II SZ 0 L27IN ABSRB VLT UR-6 L26MM 1/2 CIR D7185

## (undated) DEVICE — CANNULA SEAL

## (undated) DEVICE — TOTAL TRAY, DB, 100% SILI FOLEY, 16FR 10: Brand: MEDLINE

## (undated) DEVICE — DRAIN SURG W10XL20CM SIL SMOOTH FLAT 3/4 PERF DBL WRP

## (undated) DEVICE — PACK PROCEDURE SURG FACILITY SPEC GI BWL SPT SVMMC

## (undated) DEVICE — YANKAUER,FLEXIBLE HANDLE,REGLR CAPACITY: Brand: MEDLINE INDUSTRIES, INC.

## (undated) DEVICE — COVER LT HNDL BLU PLAS

## (undated) DEVICE — PAD,ABDOMINAL,5"X9",ST,LF,25/BX: Brand: MEDLINE INDUSTRIES, INC.

## (undated) DEVICE — SYRINGE IRRIG 60ML SFT PLIABLE BLB EZ TO GRP 1 HND USE W/

## (undated) DEVICE — BINDER ABD 2XL W9IN CIRC 62 73IN 3 PNL E HK AND LOOP CLSR W

## (undated) DEVICE — PAD,NON-ADHERENT,3X8,STERILE,LF,1/PK: Brand: MEDLINE

## (undated) DEVICE — SURGICAL PROCEDURE PACK TISS 3X5 IN ABSORBABLE SEPRAFILM

## (undated) DEVICE — RESERVOIR,SUCTION,100CC,SILICONE: Brand: MEDLINE

## (undated) DEVICE — SUTURE VCRL SZ 0 L54IN ABSRB UD LIGAPAK REEL NDL J287G

## (undated) DEVICE — GAUZE,SPONGE,FLUFF,6"X6.75",STRL,5/TRAY: Brand: MEDLINE

## (undated) DEVICE — YANKAUER,BULB TIP,W/O VENT,RIGID,STERILE: Brand: MEDLINE

## (undated) DEVICE — GLOVE SURG SZ 8 CRM LTX FREE POLYISOPRENE POLYMER BEAD ANTI

## (undated) DEVICE — GAUZE,PACKING STRIP,IODOFORM,1/2"X5YD,ST: Brand: CURAD

## (undated) DEVICE — GARMENT,MEDLINE,DVT,INT,CALF,MED, GEN2: Brand: MEDLINE

## (undated) DEVICE — SUTURE MCRYL SZ 4-0 L27IN ABSRB UD L19MM PS-2 1/2 CIR PRIM Y426H

## (undated) DEVICE — STAPLER INT CUT LN 40MM STPL 51MM GRN CRV HD B FRM

## (undated) DEVICE — SPONGE LAP W18XL18IN WHT COT 4 PLY FLD STRUNG RADPQ DISP ST

## (undated) DEVICE — SURGICAL PROCEDURE KIT BASIN MAJ SET UP

## (undated) DEVICE — BLADE ES L6IN ELASTOMERIC COAT EXT DURABLE BEND UPTO 90DEG

## (undated) DEVICE — APPLICATOR MEDICATED 26 CC SOLUTION HI LT ORNG CHLORAPREP

## (undated) DEVICE — TROCAR: Brand: KII FIOS FIRST ENTRY

## (undated) DEVICE — POSITIONER HD W8XH4XL8.5IN RASPBERRY FOAM SLT

## (undated) DEVICE — Z DUPLICATE USE 2716240 STAPLER INT CUT LN L40MM STPL L51MM GRN CRV HD B FRM

## (undated) DEVICE — INTENDED FOR TISSUE SEPARATION, AND OTHER PROCEDURES THAT REQUIRE A SHARP SURGICAL BLADE TO PUNCTURE OR CUT.: Brand: BARD-PARKER ® CARBON RIB-BACK BLADES

## (undated) DEVICE — INSUFFLATION NEEDLE TO ESTABLISH PNEUMOPERITONEUM.: Brand: INSUFFLATION NEEDLE

## (undated) DEVICE — SPONGE DRN W4XL4IN RAYON/POLYESTER 6 PLY NONWOVEN PRECUT

## (undated) DEVICE — ADHESIVE SKIN CLOSURE TOP 36 CC HI VISC DERMBND MINI

## (undated) DEVICE — LEGGINGS, PAIR, 33X51 XL, STERILE: Brand: MEDLINE

## (undated) DEVICE — 450 ML BOTTLE OF 0.05% CHLORHEXIDINE GLUCONATE IN 99.95% STERILE WATER FOR IRRIGATION, USP AND APPLICATOR.: Brand: IRRISEPT ANTIMICROBIAL WOUND LAVAGE

## (undated) DEVICE — PROTECTOR EYE PT SELF ADH NS OPT GRD LF

## (undated) DEVICE — TOWEL,OR,DSP,ST,BLUE,DLX,XR,4/PK,20PK/CS: Brand: MEDLINE

## (undated) DEVICE — DRAPE,REIN 53X77,STERILE: Brand: MEDLINE

## (undated) DEVICE — GAUZE,SPONGE,4"X4",16PLY,XRAY,STRL,LF: Brand: MEDLINE

## (undated) DEVICE — STERILE SURGICAL LUBRICANT, METAL TUBE: Brand: SURGILUBE